# Patient Record
Sex: FEMALE | Race: WHITE | NOT HISPANIC OR LATINO | Employment: OTHER | URBAN - METROPOLITAN AREA
[De-identification: names, ages, dates, MRNs, and addresses within clinical notes are randomized per-mention and may not be internally consistent; named-entity substitution may affect disease eponyms.]

---

## 2017-01-06 DIAGNOSIS — I10 ESSENTIAL HYPERTENSION: ICD-10-CM

## 2017-01-06 DIAGNOSIS — F51.01 PRIMARY INSOMNIA: Primary | ICD-10-CM

## 2017-01-06 RX ORDER — POTASSIUM CITRATE 10 MEQ/1
10 TABLET, EXTENDED RELEASE ORAL
Qty: 270 TABLET | Refills: 1 | Status: SHIPPED | OUTPATIENT
Start: 2017-01-06 | End: 2017-06-07

## 2017-01-06 NOTE — TELEPHONE ENCOUNTER
UROCIT-K      Last Written Prescription Date: 01/08/16  Last Fill Quantity: 270, # refills: 1  Last Office Visit with G, P or [facility] prescribing provider: 05/24/16       POTASSIUM   Date Value Ref Range Status   05/12/2016 3.6 3.4 - 5.3 mmol/L Final     CREATININE   Date Value Ref Range Status   05/12/2016 0.66 0.52 - 1.04 mg/dL Final     BP Readings from Last 3 Encounters:   05/24/16 110/64   02/19/16 122/60   02/16/16 118/64

## 2017-01-06 NOTE — TELEPHONE ENCOUNTER
Prescription approved per [facility] [place].

## 2017-02-27 DIAGNOSIS — F51.01 PRIMARY INSOMNIA: ICD-10-CM

## 2017-02-27 RX ORDER — QUETIAPINE FUMARATE 50 MG/1
50-100 TABLET, FILM COATED ORAL
Qty: 60 TABLET | Refills: 3 | Status: SHIPPED | OUTPATIENT
Start: 2017-02-27 | End: 2017-03-01

## 2017-02-27 NOTE — TELEPHONE ENCOUNTER
QUEtiapine     Last Written Prescription Date: 12/12/16  Last Fill Quantity: 60, # refills: 0  Last Office Visit with G, P or [facility] prescribing provider: 05/24/16       BP Readings from Last 3 Encounters:   05/24/16 110/64   02/19/16 122/60   02/16/16 118/64     Pulse Readings from Last 2 Encounters:   05/24/16 66   02/19/16 70     Lab Results   Component Value Date    GLC 86 05/12/2016     Lab Results   Component Value Date    WBC 7.0 05/10/2016     Lab Results   Component Value Date    RBC 4.29 05/10/2016     Lab Results   Component Value Date    HGB 13.6 05/10/2016     Lab Results   Component Value Date    HCT 41.4 05/10/2016     No components found for: MCT  Lab Results   Component Value Date    MCV 97 05/10/2016     Lab Results   Component Value Date    MCH 31.7 05/10/2016     Lab Results   Component Value Date    MCHC 32.9 05/10/2016     Lab Results   Component Value Date    RDW 12.8 05/10/2016     Lab Results   Component Value Date     05/10/2016     Lab Results   Component Value Date    CHOL 155 05/12/2016     Lab Results   Component Value Date    HDL 76 05/12/2016     Lab Results   Component Value Date    LDL 63 05/12/2016     Lab Results   Component Value Date    TRIG 78 05/12/2016     Lab Results   Component Value Date    CHOLHDLRATIO 2.0 01/19/2015

## 2017-03-01 DIAGNOSIS — F51.01 PRIMARY INSOMNIA: ICD-10-CM

## 2017-03-01 RX ORDER — QUETIAPINE FUMARATE 50 MG/1
50-100 TABLET, FILM COATED ORAL
Qty: 180 TABLET | Refills: 0 | Status: SHIPPED | OUTPATIENT
Start: 2017-03-01 | End: 2017-06-01

## 2017-03-01 NOTE — TELEPHONE ENCOUNTER
QUEtiapine     Last Written Prescription Date: 02/27/17  Last Fill Quantity: 60, # refills: 3  Last Office Visit with G, P or [facility] prescribing provider: 05/19/16       BP Readings from Last 3 Encounters:   05/24/16 110/64   02/19/16 122/60   02/16/16 118/64     Pulse Readings from Last 2 Encounters:   05/24/16 66   02/19/16 70     Lab Results   Component Value Date    GLC 86 05/12/2016     Lab Results   Component Value Date    WBC 7.0 05/10/2016     Lab Results   Component Value Date    RBC 4.29 05/10/2016     Lab Results   Component Value Date    HGB 13.6 05/10/2016     Lab Results   Component Value Date    HCT 41.4 05/10/2016     No components found for: MCT  Lab Results   Component Value Date    MCV 97 05/10/2016     Lab Results   Component Value Date    MCH 31.7 05/10/2016     Lab Results   Component Value Date    MCHC 32.9 05/10/2016     Lab Results   Component Value Date    RDW 12.8 05/10/2016     Lab Results   Component Value Date     05/10/2016     Lab Results   Component Value Date    CHOL 155 05/12/2016     Lab Results   Component Value Date    HDL 76 05/12/2016     Lab Results   Component Value Date    LDL 63 05/12/2016     Lab Results   Component Value Date    TRIG 78 05/12/2016     Lab Results   Component Value Date    CHOLHDLRATIO 2.0 01/19/2015

## 2017-03-10 DIAGNOSIS — E78.2 MIXED HYPERLIPIDEMIA: ICD-10-CM

## 2017-03-10 RX ORDER — ATORVASTATIN CALCIUM 10 MG/1
10 TABLET, FILM COATED ORAL DAILY
Qty: 90 TABLET | Refills: 0 | Status: SHIPPED | OUTPATIENT
Start: 2017-03-10 | End: 2017-06-08

## 2017-03-10 NOTE — TELEPHONE ENCOUNTER
atorvastatin     Last Written Prescription Date: 01/15/16  Last Fill Quantity: 90, # refills: 3  Last Office Visit with G, P or [facility] prescribing provider: 05/24/16       Lab Results   Component Value Date    CHOL 155 05/12/2016     Lab Results   Component Value Date    HDL 76 05/12/2016     Lab Results   Component Value Date    LDL 63 05/12/2016     Lab Results   Component Value Date    TRIG 78 05/12/2016     Lab Results   Component Value Date    CHOLHDLRATIO 2.0 01/19/2015

## 2017-03-16 ENCOUNTER — TELEPHONE (OUTPATIENT)
Dept: INTERNAL MEDICINE | Facility: CLINIC | Age: 77
End: 2017-03-16

## 2017-03-16 DIAGNOSIS — H81.03 MENIERE'S DISEASE, BILATERAL: ICD-10-CM

## 2017-03-16 NOTE — TELEPHONE ENCOUNTER
[facility] and [facility] stated that she didn't receives refills- she has 4 left/ and it takes [facility], she said  [facility] will work     diazepam (VALIUM) 2 MG tablet      Last Written Prescription Date:  12/8/2016  Last Fill Quantity: 90,   # refills: 2  Last Office Visit with [facility], [facility] or [facility] prescribing provider: 5/24/2016  Future Office visit:       Routing refill request to provider for review/approval because:  Drug not on the [facility], [facility] or [facility] refill protocol or controlled substance

## 2017-03-16 NOTE — TELEPHONE ENCOUNTER
Reason for Call:  Medication or medication refill:    Do you use a [facility]?  Name of the pharmacy and phone number for the current request:  [facility] - [place], [place] - [address] [place] [address]    Name of the medication requested: diazepam (VALIUM) 2 MG tablet    Other request: Pt needs a nurse to call her as soon as they open this message to make sure this isnt getting forgotten about. Pt is almost out of the prescription and she needs it for every day.     Can we leave a detailed message on this number? NO    Phone number patient can be reached at: Home number on file [phone] (home)    Best Time: ASAP    Call taken on 3/16/2017 at 9:43 AM by Comfort Hogue

## 2017-03-17 RX ORDER — DIAZEPAM 2 MG
2 TABLET ORAL AT BEDTIME
Qty: 90 TABLET | Refills: 2
Start: 2017-03-17 | End: 2017-05-30

## 2017-04-26 ENCOUNTER — HOSPITAL ENCOUNTER (OUTPATIENT)
Dept: MAMMOGRAPHY | Facility: CLINIC | Age: 77
Discharge: HOME | End: 2017-04-26
Attending: INTERNAL MEDICINE | Admitting: INTERNAL MEDICINE
Payer: MEDICARE

## 2017-04-26 DIAGNOSIS — Z12.31 VISIT FOR SCREENING MAMMOGRAM: ICD-10-CM

## 2017-04-26 PROCEDURE — G0202 SCR MAMMO BI INCL CAD: HCPCS

## 2017-04-26 PROCEDURE — 77063 BREAST TOMOSYNTHESIS BI: CPT

## 2017-04-28 DIAGNOSIS — I10 ESSENTIAL HYPERTENSION: ICD-10-CM

## 2017-04-28 RX ORDER — HYDROCHLOROTHIAZIDE 25 MG/1
TABLET ORAL
Qty: 90 TABLET | Refills: 0 | Status: SHIPPED | OUTPATIENT
Start: 2017-04-28 | End: 2017-07-28

## 2017-04-28 NOTE — TELEPHONE ENCOUNTER
hydrochlorothiazide (HYDRODIURIL) 25 MG tablet      Last Written Prescription Date: 11/1/16  Last Fill Quantity: 90, # refills: 1  Last Office Visit with [facility], [facility] or [facility] prescribing provider: 5/24/16       Potassium   Date Value Ref Range Status   05/12/2016 3.6 3.4 - 5.3 mmol/L Final     Creatinine   Date Value Ref Range Status   05/12/2016 0.66 0.52 - 1.04 mg/dL Final     BP Readings from Last 3 Encounters:   05/24/16 110/64   02/19/16 122/60   02/16/16 118/64

## 2017-05-30 DIAGNOSIS — H81.03 MENIERE'S DISEASE, BILATERAL: ICD-10-CM

## 2017-05-30 NOTE — TELEPHONE ENCOUNTER
Patient calling requesting refill of Diazepam.  States she never picked up prescription at [facility] that was called in 3/17/17 and that [facility].  After talking with Dr. Gregory MD will approve 3 month supply ONLY if patient is going to follow up with her soon.  Patient last saw Dr. Zarate so if she is going to follow up with [facility], we must send refill to [facility] per Dr. Jenkins.  Left message for patient to call back to clarify.  Must schedule appointment with Dr. Jenkins and have lab work done before appointment if she is going to continue to see her in clinic.

## 2017-05-31 NOTE — TELEPHONE ENCOUNTER
Controlled Substance Refill Request for diazepam  Problem List Complete:  Yes    Last Written Prescription Date:  03/17/17  Last Fill Quantity: 90,   # refills: 2    Last Office Visit with [facility] primary care provider: 05/24/16    Clinic visit frequency required: Q 3 months     Future Office visit: 08/08/17  Next 5 appointments (look out 90 days)     Aug 08, 2017  9:30 AM CDT   PHYSICAL with Martinez Jenkins MD   [facility]    [address] [place] [address] [phone]                  Controlled substance agreement on file: No.     Processing:  [facility]   checked in past 6 months?  No, route to RN

## 2017-05-31 NOTE — TELEPHONE ENCOUNTER
Spoke with patient who confirmed that she would like to stay under the care of Dr. Jenkins.  Scheduled appointment for August 8th.  3 month supply called into pharmacy.  Please sign refill and place future lab orders.  Thank you.

## 2017-06-01 DIAGNOSIS — F51.01 PRIMARY INSOMNIA: ICD-10-CM

## 2017-06-01 RX ORDER — QUETIAPINE FUMARATE 50 MG/1
TABLET, FILM COATED ORAL
Qty: 180 TABLET | Refills: 0 | Status: SHIPPED | OUTPATIENT
Start: 2017-06-01 | End: 2017-08-08

## 2017-06-01 NOTE — TELEPHONE ENCOUNTER
Medication is being filled for 1 time refill only due to:  Patient needs to be seen because it has been more than one year since last visit.  Pt does have upcoming appt scheduled

## 2017-06-01 NOTE — TELEPHONE ENCOUNTER
quetiapine     Last Written Prescription Date: 03/01/17  Last Fill Quantity: 180, # refills: 0  Last Office Visit with [facility], [facility] or  [facility] prescribing provider: 05/24/16  Next 5 appointments (look out 90 days)     Aug 08, 2017  9:30 AM CDT   PHYSICAL with Martinez Jenkins MD   [facility] ([facility])    [address] [address] [phone]                   BP Readings from Last 3 Encounters:   05/24/16 110/64   02/19/16 122/60   02/16/16 118/64     Pulse Readings from Last 2 Encounters:   05/24/16 66   02/19/16 70     Lab Results   Component Value Date    GLC 86 05/12/2016     Lab Results   Component Value Date    WBC 7.0 05/10/2016     Lab Results   Component Value Date    RBC 4.29 05/10/2016     Lab Results   Component Value Date    HGB 13.6 05/10/2016     Lab Results   Component Value Date    HCT 41.4 05/10/2016     No components found for: MCT  Lab Results   Component Value Date    MCV 97 05/10/2016     Lab Results   Component Value Date    MCH 31.7 05/10/2016     Lab Results   Component Value Date    MCHC 32.9 05/10/2016     Lab Results   Component Value Date    RDW 12.8 05/10/2016     Lab Results   Component Value Date     05/10/2016     Lab Results   Component Value Date    CHOL 155 05/12/2016     Lab Results   Component Value Date    HDL 76 05/12/2016     Lab Results   Component Value Date    LDL 63 05/12/2016     Lab Results   Component Value Date    TRIG 78 05/12/2016     Lab Results   Component Value Date    CHOLHDLRATIO 2.0 01/19/2015

## 2017-06-02 RX ORDER — DIAZEPAM 2 MG
2 TABLET ORAL AT BEDTIME
Qty: 90 TABLET | Refills: 0 | Status: SHIPPED | OUTPATIENT
Start: 2017-06-02 | End: 2017-08-08

## 2017-06-02 NOTE — TELEPHONE ENCOUNTER
Please let her know that I will  Fill her prescription for 3 months without refills as this is a controlled substance and she cannot have a whole year worth of refills per our protocol. I will address further refills at  Her next office visit.

## 2017-06-07 DIAGNOSIS — I10 ESSENTIAL HYPERTENSION: ICD-10-CM

## 2017-06-07 RX ORDER — POTASSIUM CITRATE 10 MEQ/1
10 TABLET, EXTENDED RELEASE ORAL
Qty: 90 TABLET | Refills: 0 | Status: SHIPPED | OUTPATIENT
Start: 2017-06-07 | End: 2017-06-07

## 2017-06-07 RX ORDER — POTASSIUM CITRATE 10 MEQ/1
10 TABLET, EXTENDED RELEASE ORAL
Qty: 270 TABLET | Refills: 0 | Status: SHIPPED | OUTPATIENT
Start: 2017-06-07 | End: 2017-06-08

## 2017-06-08 ENCOUNTER — TELEPHONE (OUTPATIENT)
Dept: INTERNAL MEDICINE | Facility: CLINIC | Age: 77
End: 2017-06-08

## 2017-06-08 DIAGNOSIS — E78.2 MIXED HYPERLIPIDEMIA: ICD-10-CM

## 2017-06-08 DIAGNOSIS — I10 ESSENTIAL HYPERTENSION: ICD-10-CM

## 2017-06-08 RX ORDER — POTASSIUM CITRATE 10 MEQ/1
10 TABLET, EXTENDED RELEASE ORAL
Qty: 90 TABLET | Refills: 0
Start: 2017-06-08 | End: 2017-08-08

## 2017-06-08 RX ORDER — POTASSIUM CITRATE 10 MEQ/1
10 TABLET, EXTENDED RELEASE ORAL
Qty: 10 TABLET | Refills: 0 | Status: SHIPPED | OUTPATIENT
Start: 2017-06-08 | End: 2017-06-08

## 2017-06-08 RX ORDER — POTASSIUM CITRATE 10 MEQ/1
10 TABLET, EXTENDED RELEASE ORAL
Qty: 270 TABLET | Refills: 0 | Status: SHIPPED | OUTPATIENT
Start: 2017-06-08 | End: 2017-08-08

## 2017-06-08 RX ORDER — POTASSIUM CITRATE 10 MEQ/1
10 TABLET, EXTENDED RELEASE ORAL
Qty: 30 TABLET | Refills: 0 | Status: SHIPPED | OUTPATIENT
Start: 2017-06-08 | End: 2017-06-08

## 2017-06-08 RX ORDER — ATORVASTATIN CALCIUM 10 MG/1
TABLET, FILM COATED ORAL
Qty: 90 TABLET | Refills: 0 | Status: SHIPPED | OUTPATIENT
Start: 2017-06-08 | End: 2017-06-21

## 2017-06-08 NOTE — TELEPHONE ENCOUNTER
Prescription approved per [facility] Refill Protocol.  Pt has upcoming appt scheduled.

## 2017-06-08 NOTE — TELEPHONE ENCOUNTER
Pt needed a 10 days supply till mail order comes . It was $10 for a 10 day or 30 day supply so gave her 30 days .Francesca Nguyễn RN  Also [facility].Francesca Nguyễn RN

## 2017-06-21 DIAGNOSIS — E78.2 MIXED HYPERLIPIDEMIA: ICD-10-CM

## 2017-06-21 RX ORDER — ATORVASTATIN CALCIUM 10 MG/1
10 TABLET, FILM COATED ORAL DAILY
Qty: 90 TABLET | Refills: 0 | Status: CANCELLED | OUTPATIENT
Start: 2017-06-21

## 2017-06-22 RX ORDER — ATORVASTATIN CALCIUM 10 MG/1
10 TABLET, FILM COATED ORAL DAILY
Qty: 90 TABLET | Refills: 0 | Status: SHIPPED | OUTPATIENT
Start: 2017-06-22 | End: 2017-08-08

## 2017-06-24 ENCOUNTER — TELEPHONE (OUTPATIENT)
Dept: INTERNAL MEDICINE | Facility: CLINIC | Age: 77
End: 2017-06-24

## 2017-06-24 DIAGNOSIS — I10 ESSENTIAL HYPERTENSION: ICD-10-CM

## 2017-06-26 NOTE — TELEPHONE ENCOUNTER
Duplicate? Pot chlor requested;  approved pot citrate 06/08/17 for 90

## 2017-06-28 NOTE — TELEPHONE ENCOUNTER
This medication was originally ordered by Dr. BRANDT and has been filled every year by an RN since 2015 as potassium citrate never potassium chloride so this is the correct medication.  See below for original order

## 2017-07-26 ENCOUNTER — TRANSFERRED RECORDS (OUTPATIENT)
Dept: HEALTH INFORMATION MANAGEMENT | Facility: CLINIC | Age: 77
End: 2017-07-26

## 2017-07-28 DIAGNOSIS — I10 ESSENTIAL HYPERTENSION: ICD-10-CM

## 2017-07-28 RX ORDER — HYDROCHLOROTHIAZIDE 25 MG/1
TABLET ORAL
Qty: 30 TABLET | Refills: 0 | Status: SHIPPED | OUTPATIENT
Start: 2017-07-28 | End: 2017-08-08

## 2017-07-28 NOTE — TELEPHONE ENCOUNTER
HCTZ      Last Written Prescription Date: 4/28/17  Last Fill Quantity: 90, # refills: 0  Last Office Visit with G, P or [facility] prescribing provider: 5/24/17  Next 5 appointments (look out 90 days)     Aug 08, 2017  9:30 AM CDT   PHYSICAL with Martinez Jenkins MD   [facility] ([facility])    [address] [address] [phone]                   Potassium   Date Value Ref Range Status   05/12/2016 3.6 3.4 - 5.3 mmol/L Final     Creatinine   Date Value Ref Range Status   05/12/2016 0.66 0.52 - 1.04 mg/dL Final     BP Readings from Last 3 Encounters:   05/24/16 110/64   02/19/16 122/60   02/16/16 118/64

## 2017-07-28 NOTE — TELEPHONE ENCOUNTER
Prescription approved per [facility] Refill Protocol.  Pt has upcoming appointment

## 2017-08-03 ENCOUNTER — DOCUMENTATION ONLY (OUTPATIENT)
Dept: LAB | Facility: CLINIC | Age: 77
End: 2017-08-03

## 2017-08-07 ENCOUNTER — DOCUMENTATION ONLY (OUTPATIENT)
Dept: LAB | Facility: CLINIC | Age: 77
End: 2017-08-07

## 2017-08-07 DIAGNOSIS — E53.8 VITAMIN B12 DEFICIENCY (NON ANEMIC): ICD-10-CM

## 2017-08-07 DIAGNOSIS — I10 ESSENTIAL HYPERTENSION: ICD-10-CM

## 2017-08-07 DIAGNOSIS — E55.9 VITAMIN D DEFICIENCY: ICD-10-CM

## 2017-08-07 DIAGNOSIS — M81.0 OSTEOPOROSIS, UNSPECIFIED OSTEOPOROSIS TYPE, UNSPECIFIED PATHOLOGICAL FRACTURE PRESENCE: ICD-10-CM

## 2017-08-07 DIAGNOSIS — E78.5 HYPERLIPIDEMIA LDL GOAL <130: Primary | ICD-10-CM

## 2017-08-08 ENCOUNTER — OFFICE VISIT (OUTPATIENT)
Dept: INTERNAL MEDICINE | Facility: CLINIC | Age: 77
End: 2017-08-08
Payer: MEDICARE

## 2017-08-08 VITALS
HEIGHT: 62 IN | SYSTOLIC BLOOD PRESSURE: 112 MMHG | HEART RATE: 70 BPM | DIASTOLIC BLOOD PRESSURE: 64 MMHG | TEMPERATURE: 97.8 F | OXYGEN SATURATION: 98 % | WEIGHT: 113 LBS | RESPIRATION RATE: 16 BRPM | BODY MASS INDEX: 20.8 KG/M2

## 2017-08-08 DIAGNOSIS — I10 ESSENTIAL HYPERTENSION: ICD-10-CM

## 2017-08-08 DIAGNOSIS — E78.5 HYPERLIPIDEMIA LDL GOAL <130: ICD-10-CM

## 2017-08-08 DIAGNOSIS — Z00.00 MEDICARE ANNUAL WELLNESS VISIT, SUBSEQUENT: Primary | ICD-10-CM

## 2017-08-08 DIAGNOSIS — E55.9 VITAMIN D DEFICIENCY: ICD-10-CM

## 2017-08-08 DIAGNOSIS — F51.01 PRIMARY INSOMNIA: ICD-10-CM

## 2017-08-08 DIAGNOSIS — E53.8 VITAMIN B12 DEFICIENCY (NON ANEMIC): ICD-10-CM

## 2017-08-08 DIAGNOSIS — R41.3 MEMORY PROBLEM: ICD-10-CM

## 2017-08-08 DIAGNOSIS — Z78.0 ASYMPTOMATIC POSTMENOPAUSAL STATUS: ICD-10-CM

## 2017-08-08 DIAGNOSIS — E78.2 MIXED HYPERLIPIDEMIA: ICD-10-CM

## 2017-08-08 DIAGNOSIS — K59.00 CONSTIPATION, UNSPECIFIED CONSTIPATION TYPE: ICD-10-CM

## 2017-08-08 DIAGNOSIS — H81.03 MENIERE'S DISEASE, BILATERAL: ICD-10-CM

## 2017-08-08 LAB
ALBUMIN SERPL-MCNC: 3.9 G/DL (ref 3.4–5)
ALP SERPL-CCNC: 52 U/L (ref 40–150)
ALT SERPL W P-5'-P-CCNC: 25 U/L (ref 0–50)
ANION GAP SERPL CALCULATED.3IONS-SCNC: 6 MMOL/L (ref 3–14)
AST SERPL W P-5'-P-CCNC: 27 U/L (ref 0–45)
BILIRUB SERPL-MCNC: 0.6 MG/DL (ref 0.2–1.3)
BUN SERPL-MCNC: 17 MG/DL (ref 7–30)
CALCIUM SERPL-MCNC: 9.8 MG/DL (ref 8.5–10.1)
CHLORIDE SERPL-SCNC: 101 MMOL/L (ref 94–109)
CHOLEST SERPL-MCNC: 158 MG/DL
CO2 SERPL-SCNC: 31 MMOL/L (ref 20–32)
CREAT SERPL-MCNC: 0.66 MG/DL (ref 0.52–1.04)
DEPRECATED CALCIDIOL+CALCIFEROL SERPL-MC: 49 UG/L (ref 20–75)
ERYTHROCYTE [DISTWIDTH] IN BLOOD BY AUTOMATED COUNT: 12.6 % (ref 10–15)
GFR SERPL CREATININE-BSD FRML MDRD: 86 ML/MIN/1.7M2
GLUCOSE SERPL-MCNC: 90 MG/DL (ref 70–99)
HCT VFR BLD AUTO: 41.8 % (ref 35–47)
HDLC SERPL-MCNC: 85 MG/DL
HGB BLD-MCNC: 13.7 G/DL (ref 11.7–15.7)
LDLC SERPL CALC-MCNC: 55 MG/DL
MCH RBC QN AUTO: 31.4 PG (ref 26.5–33)
MCHC RBC AUTO-ENTMCNC: 32.8 G/DL (ref 31.5–36.5)
MCV RBC AUTO: 96 FL (ref 78–100)
NONHDLC SERPL-MCNC: 73 MG/DL
PLATELET # BLD AUTO: 193 10E9/L (ref 150–450)
POTASSIUM SERPL-SCNC: 3.4 MMOL/L (ref 3.4–5.3)
PROT SERPL-MCNC: 7.3 G/DL (ref 6.8–8.8)
RBC # BLD AUTO: 4.36 10E12/L (ref 3.8–5.2)
SODIUM SERPL-SCNC: 138 MMOL/L (ref 133–144)
T4 FREE SERPL-MCNC: 0.97 NG/DL (ref 0.76–1.46)
TRIGL SERPL-MCNC: 92 MG/DL
TSH SERPL DL<=0.005 MIU/L-ACNC: 2.33 MU/L (ref 0.4–4)
VIT B12 SERPL-MCNC: 1891 PG/ML (ref 193–986)
WBC # BLD AUTO: 4.2 10E9/L (ref 4–11)

## 2017-08-08 PROCEDURE — 82306 VITAMIN D 25 HYDROXY: CPT | Performed by: INTERNAL MEDICINE

## 2017-08-08 PROCEDURE — 82607 VITAMIN B-12: CPT | Performed by: INTERNAL MEDICINE

## 2017-08-08 PROCEDURE — 80053 COMPREHEN METABOLIC PANEL: CPT | Performed by: INTERNAL MEDICINE

## 2017-08-08 PROCEDURE — 84207 ASSAY OF VITAMIN B-6: CPT | Mod: 90 | Performed by: INTERNAL MEDICINE

## 2017-08-08 PROCEDURE — 84443 ASSAY THYROID STIM HORMONE: CPT | Performed by: INTERNAL MEDICINE

## 2017-08-08 PROCEDURE — 36415 COLL VENOUS BLD VENIPUNCTURE: CPT | Performed by: INTERNAL MEDICINE

## 2017-08-08 PROCEDURE — G0439 PPPS, SUBSEQ VISIT: HCPCS | Performed by: INTERNAL MEDICINE

## 2017-08-08 PROCEDURE — 84439 ASSAY OF FREE THYROXINE: CPT | Performed by: INTERNAL MEDICINE

## 2017-08-08 PROCEDURE — 99000 SPECIMEN HANDLING OFFICE-LAB: CPT | Performed by: INTERNAL MEDICINE

## 2017-08-08 PROCEDURE — 85027 COMPLETE CBC AUTOMATED: CPT | Performed by: INTERNAL MEDICINE

## 2017-08-08 PROCEDURE — 80061 LIPID PANEL: CPT | Performed by: INTERNAL MEDICINE

## 2017-08-08 RX ORDER — POLYETHYLENE GLYCOL 3350 17 G/17G
1 POWDER, FOR SOLUTION ORAL DAILY
Qty: 527 G | Refills: 11 | Status: SHIPPED | OUTPATIENT
Start: 2017-08-08 | End: 2017-10-16

## 2017-08-08 RX ORDER — QUETIAPINE FUMARATE 50 MG/1
TABLET, FILM COATED ORAL
Qty: 180 TABLET | Refills: 1 | Status: ON HOLD | OUTPATIENT
Start: 2017-08-08 | End: 2020-01-05

## 2017-08-08 RX ORDER — ATORVASTATIN CALCIUM 10 MG/1
10 TABLET, FILM COATED ORAL DAILY
Qty: 90 TABLET | Refills: 1 | Status: SHIPPED | OUTPATIENT
Start: 2017-08-08

## 2017-08-08 RX ORDER — DIAZEPAM 2 MG
2 TABLET ORAL AT BEDTIME
Qty: 90 TABLET | Refills: 1 | Status: ON HOLD | OUTPATIENT
Start: 2017-08-08 | End: 2023-04-30

## 2017-08-08 RX ORDER — MULTIVIT WITH MINERALS/LUTEIN
1000 TABLET ORAL 2 TIMES DAILY
Qty: 100 CAPSULE | Status: ON HOLD | OUTPATIENT
Start: 2017-08-08 | End: 2021-06-28

## 2017-08-08 RX ORDER — CYANOCOBALAMIN (VITAMIN B-12) 2500 MCG
2500 TABLET, SUBLINGUAL SUBLINGUAL DAILY
Qty: 250 TABLET | Refills: 3 | Status: SHIPPED | OUTPATIENT
Start: 2017-08-08

## 2017-08-08 RX ORDER — POTASSIUM CITRATE 10 MEQ/1
10 TABLET, EXTENDED RELEASE ORAL
Qty: 270 TABLET | Refills: 1 | Status: SHIPPED | OUTPATIENT
Start: 2017-08-08 | End: 2017-10-16

## 2017-08-08 RX ORDER — HYDROCHLOROTHIAZIDE 25 MG/1
TABLET ORAL
Qty: 90 TABLET | Refills: 1 | Status: ON HOLD | OUTPATIENT
Start: 2017-08-08 | End: 2020-01-05

## 2017-08-08 NOTE — PROGRESS NOTES
SUBJECTIVE:   Carmen Zhang is a 77 year old female who presents for Preventive Visit.      Are you in the first 12 months of your Medicare Part B coverage?  No    Healthy Habits:    Do you get at least three servings of calcium containing foods daily (dairy, green leafy vegetables, etc.)? yes    Amount of exercise or daily activities, outside of work: 6 day(s) per week    Problems taking medications regularly No    Medication side effects: No    Have you had an eye exam in the past two years? yes    Do you see a dentist twice per year? yes    Do you have sleep apnea, excessive snoring or daytime drowsiness?trouble sleeping- melatonin     COGNITIVE SCREEN  1) Repeat 3 items (Banana, Sunrise, Chair)    2) Clock draw: NORMAL  3) 3 item recall: Recalls 3 objects  Results: 3 items recalled: COGNITIVE IMPAIRMENT LESS LIKELY    Mini-CogTM Copyright S Giancarlo. Licensed by the author for use in [facility]; reprinted with permission ([email or web address]). All rights reserved.          Reviewed and updated as needed this visit by clinical staffTobacco  Allergies         Reviewed and updated as needed this visit by Provider        Social History   Substance Use Topics     Smoking status: Former Smoker     Packs/day: 3.00     Years: 13.00     Types: Cigarettes     Quit date: 1/1/1961     Smokeless tobacco: Not on file      Comment: STARTED SMOKING AT AGE 8 - quit in 1961     Alcohol use No       The patient does not drink >3 drinks per day nor >7 drinks per week.    Today's PHQ-2 Score:   PHQ-2 ( 1999 [employer]) 8/8/2017 5/24/2016   Q1: Little interest or pleasure in doing things 0 1   Q2: Feeling down, depressed or hopeless 0 1   PHQ-2 Score 0 2         Do you feel safe in your environment - Yes    Do you have a Health Care Directive?: Yes: Advance Directive has been received and scanned.    Current providers sharing in care for this patient include: Patient Care Team:  Martinez Jenkins MD as PCP -  "General      Hearing impairment: No    Ability to successfully perform activities of daily living: Yes, no assistance needed     Fall risk:  Fallen 2 or more times in the past year?: No  Any fall with injury in the past year?: No    Home safety:  none identified      Hyperlipidemia Follow-Up      Rate your low fat/cholesterol diet?: good    Taking statin?  Yes, no muscle aches from statin    Other lipid medications/supplements?:  none    Hypertension Follow-up      Outpatient blood pressures are not being checked.    Low Salt Diet: low salt        Symptoms of Ménière's disease have been stable. Carmen Davila states that she has been somewhat fatigued lately but on review of her medications and appears that she had inadvertently stopped taking vitamin D.   memory has also improved with reduction of serum Vit D level.      The following health maintenance items are reviewed in Epic and correct as of today:Health Maintenance   Topic Date Due     MEDICARE ANNUAL WELLNESS VISIT  01/16/2015     ADVANCE DIRECTIVE PLANNING Q5 YRS  04/19/2016     BMP Q6 MOS  11/12/2016     FALL RISK ASSESSMENT  02/16/2017     TSH Q1 YEAR  05/10/2017     CMP Q1 YR  05/12/2017     LIPID MONITORING Q1 YEAR  05/12/2017     DEXA Q3 YR  01/14/2019     COLONOSCOPY Q10 YR  03/18/2021     TETANUS IMMUNIZATION (SYSTEM ASSIGNED)  02/16/2026     PNEUMOCOCCAL  Completed     Labs reviewed in [facility] -   She had fasting labs drawn this morning and hemoglobin is within acceptable limits but the rest of the test results are still pending.        ROS:  14 point ROS reviewed in detail and negative     OBJECTIVE:   /64 (BP Location: Right arm, Patient Position: Chair, Cuff Size: Adult Regular)  Pulse 70  Temp 97.8  F (36.6  C) (Oral)  Resp 16  Ht 5' 2\" (1.575 m)  Wt 113 lb (51.3 kg)  SpO2 98%  BMI 20.67 kg/m2 Estimated body mass index is 20.67 kg/(m^2) as calculated from the following:    Height as of this encounter: 5' 2\" (1.575 m).    Weight as of this " encounter: 113 lb (51.3 kg).  EXAM:   GENERAL: healthy, alert and no distress  NECK: no adenopathy, no asymmetry, masses, or scars and thyroid normal to palpation  RESP: lungs clear to auscultation - no rales, rhonchi or wheezes  BREAST: normal without masses, tenderness or nipple discharge and no palpable axillary masses or adenopathy  CV: regular rate and rhythm, normal S1 S2, no S3 or S4, no murmur, click or rub, no peripheral edema and peripheral pulses strong  ABDOMEN: soft, nontender, no hepatosplenomegaly, no masses and bowel sounds normal  MS: no gross musculoskeletal defects noted, no edema  SKIN: no suspicious lesions or rashes  NEURO: Normal strength and tone, mentation intact and speech normal  PSYCH: mentation appears normal, affect normal/bright    ASSESSMENT / PLAN:     PREVENTATIVE:   The importance of routine health maintenance including exercise, healthy eating habits, maintaining good mental, sexual health, SBE and need for regular pap smears as indicated is emphasized.    PROBLEM FOCUSED:    ICD-10-CM    1. Medicare annual wellness visit, subsequent Z00.00    2. Essential hypertension I10 hydrochlorothiazide (HYDRODIURIL) 25 MG tablet     potassium citrate (UROCIT-K) 10 MEQ (1080 MG) CR tablet   3. Mixed hyperlipidemia E78.2 atorvastatin (LIPITOR) 10 MG tablet   4. Meniere's disease, bilateral H81.03 diazepam (VALIUM) 2 MG tablet   5. Primary insomnia F51.01 QUEtiapine (SEROQUEL) 50 MG tablet   6. Constipation, unspecified constipation type K59.00 polyethylene glycol (MIRALAX) powder   7. Asymptomatic postmenopausal status Z78.0 calcium-vitamin D (CALCIUM 600 + D) 600-400 MG-UNIT per tablet   8. Vitamin B12 deficiency (non anemic) E53.8 cyabnocobalamin (VITAMIN B-12) 2500 MCG sublingual tablet   9. Memory problem R41.3 vitamin E (RA VITAMIN E) 1000 UNIT capsule   10. Vitamin D deficiency E55.9 cholecalciferol 2000 UNITS CAPS       SIGNIFICANT ISSUES RE The primary encounter diagnosis was Medicare  annual wellness visit, subsequent. Diagnoses of Essential hypertension, Mixed hyperlipidemia, Meniere's disease, bilateral, Primary insomnia, Constipation, unspecified constipation type, Asymptomatic postmenopausal status, Vitamin B12 deficiency (non anemic), Memory problem, and Vitamin D deficiency were also pertinent to this visit. AS NOTED AND ADDRESSED ABOVE   MEDS AND LABS AS ORDERED TO ADDRESS PREVIOUSLY ABNORMAL INDICES    SEE PATIENT INSTRUCTION SECTION FOR FOLLOW UP PLAN    PT TO CONTINUE OTHER TREATMENT REGIMEN/PLANS EXCEPT AS INDICATED    COMPLIANCE WITH MEDICATIONS DIET AND EXERCISE PLANS ENCOURAGED    DISCONTINUED MEDS:  Medications Discontinued During This Encounter   Medication Reason     potassium citrate (UROCIT-K) 10 MEQ (1080 MG) CR tablet      HYDROcodone-acetaminophen (NORCO) 5-325 MG per tablet      hydrochlorothiazide (HYDRODIURIL) 25 MG tablet Reorder     atorvastatin (LIPITOR) 10 MG tablet Reorder     potassium citrate (UROCIT-K) 10 MEQ (1080 MG) CR tablet Reorder     diazepam (VALIUM) 2 MG tablet Reorder     QUEtiapine (SEROQUEL) 50 MG tablet Reorder     polyethylene glycol (MIRALAX) powder Reorder     calcium-vitamin D (CALCIUM 600 + D) 600-400 MG-UNIT per tablet Reorder     Cyanocobalamin (VITAMIN  B-12) 2500 MCG tablet Reorder     vitamin E (RA VITAMIN E) 1000 UNIT capsule Reorder       CURRENT MED LIST WITH CHANGES AS NOTED BELOW:  Current Outpatient Prescriptions   Medication Sig Dispense Refill     hydrochlorothiazide (HYDRODIURIL) 25 MG tablet TAKE 1 TABLET EVERY MORNING TO LOWER BLOOD PRESSURE 90 tablet 1     atorvastatin (LIPITOR) 10 MG tablet Take 1 tablet (10 mg) by mouth daily Pt is requesting that she only receive [employer]. 90 tablet 1     potassium citrate (UROCIT-K) 10 MEQ (1080 MG) CR tablet Take 1 tablet (10 mEq) by mouth 3 times daily (with meals) INDICATION:POTASSIUM SUPPLEMENTATION 270 tablet 1     diazepam (VALIUM) 2 MG tablet Take 1 tablet (2 mg) by mouth At Bedtime  90 tablet 1     QUEtiapine (SEROQUEL) 50 MG tablet TAKE 1 TO 2 TABLETS ( 50  MG) NIGHTLY AS NEEDED FOR SLEEP  tablet 1     polyethylene glycol (MIRALAX) powder Take 17 g (1 capful) by mouth daily INDICATION: CONSTIPATION, TO ACHIEVE 1-2 SOFT BMs PER  g 11     calcium-vitamin D (CALCIUM 600 + D) 600-400 MG-UNIT per tablet Take 1 tablet by mouth 2 times daily FOR BONE HEALTH AND FOR VITAMIN D DEFICIENCY (LOW VITAMIN D) 100 tablet PRN     cyabnocobalamin (VITAMIN B-12) 2500 MCG sublingual tablet Take 2,500 mcg by mouth daily INDICATION: FOR VITAMIN B12 SUPPLEMENTATION - TO IMPROVE MEMORY, BALANCE, SLEEP AND MOOD, DIRECTIONS: PLEASE PLACE UNDER THE TONGUE TO DISSOLVE 250 tablet 3     vitamin E (RA VITAMIN E) 1000 UNIT capsule Take 1 capsule (1,000 Units) by mouth 2 times daily INDICATION: TO HELP IMPROVE MEMORY 100 capsule PRN     cholecalciferol 2000 UNITS CAPS Take 1 capsule by mouth daily 100 capsule PRN     Melatonin 10 MG TABS Take 10 mg by mouth At Bedtime        Lutein 20 MG TABS Take 1 tablet by mouth 2 times daily        FISH OIL 1000 MG OR CAPS 1 DAILY       COQ10 100 MG OR CAPS 2 qd  0     ASPIRIN 81 MG OR TABS 1 TABLET DAILY       MULTIVITAMIN TABS   OR 1 DAILY       [DISCONTINUED] hydrochlorothiazide (HYDRODIURIL) 25 MG tablet TAKE 1 TABLET EVERY MORNING TO LOWER BLOOD PRESSURE (NEED APPOINTMENT FOR REFILLS) 30 tablet 0     [DISCONTINUED] atorvastatin (LIPITOR) 10 MG tablet Take 1 tablet (10 mg) by mouth daily Pt is requesting that she only receive [employer]. 90 tablet 0     [DISCONTINUED] QUEtiapine (SEROQUEL) 50 MG tablet TAKE 1 TO 2 TABLETS ( 50  MG) NIGHTLY AS NEEDED FOR SLEEP  tablet 0     [DISCONTINUED] traZODone (DESYREL) 100 MG tablet Take 1-2 tablets (100-200 mg) by mouth nightly as needed for sleep 90 tablet 3         End of Life Planning:  Patient currently has an advanced directive: No.  I have verified the patient's ablity to prepare an advanced directive/make  "health care decisions.  Literature was provided to assist patient in preparing an advanced directive.    COUNSELING:  Reviewed preventive health counseling, as reflected in patient instructions       Regular exercise       Healthy diet/nutrition       Dental care        Estimated body mass index is 20.67 kg/(m^2) as calculated from the following:    Height as of this encounter: 5' 2\" (1.575 m).    Weight as of this encounter: 113 lb (51.3 kg).     reports that she quit smoking about 56 years ago. Her smoking use included Cigarettes. She has a 39.00 pack-year smoking history. She does not have any smokeless tobacco history on file.        Appropriate preventive services were discussed with this patient, including applicable screening as appropriate for cardiovascular disease, diabetes, osteopenia/osteoporosis, and glaucoma.  As appropriate for age/gender, discussed screening for colorectal cancer, prostate cancer, breast cancer, and cervical cancer. Checklist reviewing preventive services available has been given to the patient.    Reviewed patients plan of care and provided an AVS. The Basic Care Plan (routine screening as documented in Health Maintenance) for Carmen Davila meets the Care Plan requirement. This Care Plan has been established and reviewed with the Patient.    Counseling Resources:  ATP IV Guidelines  Pooled Cohorts Equation Calculator  Breast Cancer Risk Calculator  FRAX Risk Assessment  ICSI Preventive Guidelines  Dietary Guidelines for Americans, 2010  USDA's MyPlate  ASA Prophylaxis  Lung CA Screening    Martinez Jenkins MD  [facility]  "

## 2017-08-08 NOTE — MR AVS SNAPSHOT
After Visit Summary   8/8/2017    Carmen Zhang    MRN: [id number]           Patient Information     Date Of Birth          1940        Visit Information        Provider Department      8/8/2017 9:30 AM Martinez Jenkins MD [facility]        Today's Diagnoses     Medicare annual wellness visit, subsequent    -  1    Essential hypertension        Mixed hyperlipidemia        Meniere's disease, bilateral        Primary insomnia        Constipation, unspecified constipation type        Asymptomatic postmenopausal status        Vitamin B12 deficiency (non anemic)        Memory problem        Vitamin D deficiency          Care Instructions    ** FOLLOW UP PLAN**:    PLEASE SCHEDULE FASTING LABS WITH OFFICE VISIT 12 MONTHS FROM TODAY TO FOLLOW UP ON  Blood pressure, cholesterol, to review your lab results and for your annual Medicare visit     BE SURE TO CALL TO SCHEDULE YOUR LAB DRAW APPOINTMENT FOR AT LEAST 5 DAYS BEFORE YOUR NEXT VISIT      YOU MAY CONTACT THE CLINIC IF ANY QUESTIONS OR CONCERNS -[phone] OR VIA [employer]           Preventive Health Recommendations    Female Ages 65 +    Yearly exam:     See your health care provider every year in order to  o Review health changes.   o Discuss preventive care.    o Review your medicines if your doctor has prescribed any.      You no longer need a yearly Pap test unless you've had an abnormal Pap test in the past 10 years. If you have vaginal symptoms, such as bleeding or discharge, be sure to talk with your provider about a Pap test.      Every 1 to 2 years, have a mammogram.  If you are over 69, talk with your health care provider about whether or not you want to continue having screening mammograms.      Every 10 years, have a colonoscopy. Or, have a yearly FIT test (stool test). These exams will check for colon cancer.       Have a cholesterol test every 5 years, or more often if your doctor advises it.       Have  a diabetes test (fasting glucose) every three years. If you are at risk for diabetes, you should have this test more often.       At age 65, have a bone density scan (DEXA) to check for osteoporosis (brittle bone disease).    Shots:    Get a flu shot each year.    Get a tetanus shot every 10 years.    Talk to your doctor about your pneumonia vaccines. There are now two you should receive - Pneumovax (PPSV 23) and Prevnar (PCV 13).    Talk to your doctor about the shingles vaccine.    Talk to your doctor about the hepatitis B vaccine.    Nutrition:     Eat at least 5 servings of fruits and vegetables each day.      Eat whole-grain bread, whole-wheat pasta and brown rice instead of white grains and rice.      Talk to your provider about Calcium and Vitamin D.     Lifestyle    Exercise at least 150 minutes a week (30 minutes a day, 5 days a week). This will help you control your weight and prevent disease.      Limit alcohol to one drink per day.      No smoking.       Wear sunscreen to prevent skin cancer.       See your dentist twice a year for an exam and cleaning.      See your eye doctor every 1 to 2 years to screen for conditions such as glaucoma, macular degeneration and cataracts.          Follow-ups after your visit        Who to contact     If you have questions or need follow up information about today's clinic visit or your schedule please contact [facility] directly at [phone].  Normal or non-critical lab and imaging results will be communicated to you by MyChart, letter or phone within 4 business days after the clinic has received the results. If you do not hear from us within 7 days, please contact the clinic through MyChart or phone. If you have a critical or abnormal lab result, we will notify you by phone as soon as possible.  Submit refill requests through [employer] or call your pharmacy and they will forward the refill request to us. Please allow 3 business days for your  "refill to be completed.          Additional Information About Your Visit        Coveo Information     C[employer]o lets you send messages to your doctor, view your test results, renew your prescriptions, schedule appointments and more. To sign up, go to [email or web address] . Click on \"Log in\" on the left side of the screen, which will take you to the Welcome page. Then click on \"Sign up Now\" on the right side of the page.     You will be asked to enter the access code listed below, as well as some personal information. Please follow the directions to create your username and password.     Your access code is: [email or web address]  Expires: 2017 10:28 AM     Your access code will  in 90 days. If you need help or a new code, please call your [facility] clinic or [phone].        Care EveryWhere ID     This is your Care EveryWhere ID. This could be used by other organizations to access your [facility] medical records  [id number]        Your Vitals Were     Pulse Temperature Respirations Height Pulse Oximetry BMI (Body Mass Index)    70 97.8  F (36.6  C) (Oral) 16 5' 2\" (1.575 m) 98% 20.67 kg/m2       Blood Pressure from Last 3 Encounters:   17 112/64   16 110/64   16 122/60    Weight from Last 3 Encounters:   17 113 lb (51.3 kg)   16 116 lb 1.6 oz (52.7 kg)   16 120 lb (54.4 kg)              Today, you had the following     No orders found for display         Today's Medication Changes          These changes are accurate as of: 17 10:28 AM.  If you have any questions, ask your nurse or doctor.               Start taking these medicines.        Dose/Directions    cholecalciferol 2000 UNITS Caps   Used for:  Vitamin D deficiency   Started by:  Martinez Jenkins MD        Dose:  1 capsule   Take 1 capsule by mouth daily   Quantity:  100 capsule   Refills:  PRN         These medicines have changed or have updated prescriptions.        Dose/Directions    hydrochlorothiazide 25 " MG tablet   Commonly known as:  HYDRODIURIL   This may have changed:  See the new instructions.   Used for:  Essential hypertension   Changed by:  Martinez Jenkins MD        TAKE 1 TABLET EVERY MORNING TO LOWER BLOOD PRESSURE   Quantity:  90 tablet   Refills:  1       potassium citrate 10 MEQ (1080 MG) CR tablet   Commonly known as:  UROCIT-K   This may have changed:  Another medication with the same name was removed. Continue taking this medication, and follow the directions you see here.   Used for:  Essential hypertension   Changed by:  Martinez Jenkins MD        Dose:  10 mEq   Take 1 tablet (10 mEq) by mouth 3 times daily (with meals) INDICATION:POTASSIUM SUPPLEMENTATION   Quantity:  270 tablet   Refills:  1       QUEtiapine 50 MG tablet   Commonly known as:  SEROquel   This may have changed:  See the new instructions.   Used for:  Primary insomnia   Changed by:  Martinez Jenkins MD        TAKE 1 TO 2 TABLETS ( 50  MG) NIGHTLY AS NEEDED FOR SLEEP AID   Quantity:  180 tablet   Refills:  1       vitamin E 1000 UNIT capsule   Commonly known as:  RA VITAMIN E   This may have changed:    - how much to take  - when to take this   Used for:  Memory problem   Changed by:  Martinez Jenkins MD        Dose:  1000 Units   Take 1 capsule (1,000 Units) by mouth 2 times daily INDICATION: TO HELP IMPROVE MEMORY   Quantity:  100 capsule   Refills:  PRN         Stop taking these medicines if you haven't already. Please contact your care team if you have questions.     HYDROcodone-acetaminophen 5-325 MG per tablet   Commonly known as:  NORCO   Stopped by:  Martinez Jenkins MD                Where to get your medicines      These medications were sent to [id number] - [place], [place] - [address] [address] [facility]  [address] [address]     Phone:  [phone]     calcium-vitamin D 600-400 MG-UNIT per tablet    cholecalciferol 2000 UNITS Caps    polyethylene glycol powder     vitamin E 1000 UNIT capsule         These medications were sent to [facility] - [place], [place] - [address]  [address] [address]     Phone:  [phone]     atorvastatin 10 MG tablet    hydrochlorothiazide 25 MG tablet    potassium citrate 10 MEQ (1080 MG) CR tablet    QUEtiapine 50 MG tablet         Some of these will need a paper prescription and others can be bought over the counter.  Ask your nurse if you have questions.     Bring a paper prescription for each of these medications     cyabnocobalamin 2500 MCG sublingual tablet    diazepam 2 MG tablet                Primary Care Provider Office Phone # Fax #    Martinez Jenkins [phone]       [facility] [address] [facility] [address]        Equal Access to Services     MATTHEW RICKS : Levi johnston Solinda, waaxda luqadaha, qaybta kaalmada adejenniferyastanton, isela olsen. So [facility] [phone].    ATENCIÓN: Si habla español, tiene a epstein disposición servicios gratuitos de asistencia lingüística. Naa al [phone].    We comply with applicable federal civil rights laws and [place] laws. We do not discriminate on the basis of race, color, national origin, age, disability sex, sexual orientation or gender identity.            Thank you!     Thank you for choosing [facility]  for your care. Our goal is always to provide you with excellent care. Hearing back from our patients is one way we can continue to improve our services. Please take a few minutes to complete the written survey that you may receive in the mail after your visit with us. Thank you!             Your Updated Medication List - Protect others around you: Learn how to safely use, store and throw away your medicines at www.disposemymeds.org.          This list is accurate as of: 8/8/17 10:28 AM.  Always use your most recent med list.                   Brand  Name Dispense Instructions for use Diagnosis    aspirin 81 MG tablet      1 TABLET DAILY        atorvastatin 10 MG tablet    LIPITOR    90 tablet    Take 1 tablet (10 mg) by mouth daily Pt is requesting that she only receive Mylan Brand.    Mixed hyperlipidemia       calcium-vitamin D 600-400 MG-UNIT per tablet    calcium 600 + D    100 tablet    Take 1 tablet by mouth 2 times daily FOR BONE HEALTH AND FOR VITAMIN D DEFICIENCY (LOW VITAMIN D)    Asymptomatic postmenopausal status       cholecalciferol 2000 UNITS Caps     100 capsule    Take 1 capsule by mouth daily    Vitamin D deficiency       CoQ10 100 MG Caps      2 qd        cyabnocobalamin 2500 MCG sublingual tablet    VITAMIN B-12    250 tablet    Take 2,500 mcg by mouth daily INDICATION: FOR VITAMIN B12 SUPPLEMENTATION - TO IMPROVE MEMORY, BALANCE, SLEEP AND MOOD, DIRECTIONS: PLEASE PLACE UNDER THE TONGUE TO DISSOLVE    Vitamin B12 deficiency (non anemic)       diazepam 2 MG tablet    VALIUM    90 tablet    Take 1 tablet (2 mg) by mouth At Bedtime    Meniere's disease, bilateral       fish oil-omega-3 fatty acids 1000 MG capsule      1 DAILY        hydrochlorothiazide 25 MG tablet    HYDRODIURIL    90 tablet    TAKE 1 TABLET EVERY MORNING TO LOWER BLOOD PRESSURE    Essential hypertension       Lutein 20 MG Tabs      Take 1 tablet by mouth 2 times daily        Melatonin 10 MG Tabs tablet      Take 10 mg by mouth At Bedtime        MULTIVITAMIN TABS   OR      1 DAILY        polyethylene glycol powder    MIRALAX    527 g    Take 17 g (1 capful) by mouth daily INDICATION: CONSTIPATION, TO ACHIEVE 1-2 SOFT BMs PER DAY    Constipation, unspecified constipation type       potassium citrate 10 MEQ (1080 MG) CR tablet    UROCIT-K    270 tablet    Take 1 tablet (10 mEq) by mouth 3 times daily (with meals) INDICATION:POTASSIUM SUPPLEMENTATION    Essential hypertension       QUEtiapine 50 MG tablet    SEROquel    180 tablet    TAKE 1 TO 2 TABLETS ( 50  MG) NIGHTLY  AS NEEDED FOR SLEEP AID    Primary insomnia       vitamin E 1000 UNIT capsule    RA VITAMIN E    100 capsule    Take 1 capsule (1,000 Units) by mouth 2 times daily INDICATION: TO HELP IMPROVE MEMORY    Memory problem

## 2017-08-08 NOTE — PATIENT INSTRUCTIONS
** FOLLOW UP PLAN**:    PLEASE SCHEDULE FASTING LABS WITH OFFICE VISIT 12 MONTHS FROM TODAY TO FOLLOW UP ON  Blood pressure, cholesterol, to review your lab results and for your annual Medicare visit     BE SURE TO CALL TO SCHEDULE YOUR LAB DRAW APPOINTMENT FOR AT LEAST 5 DAYS BEFORE YOUR NEXT VISIT      YOU MAY CONTACT THE CLINIC IF ANY QUESTIONS OR CONCERNS -[phone] OR VIA [employer]           Preventive Health Recommendations    Female Ages 65 +    Yearly exam:     See your health care provider every year in order to  o Review health changes.   o Discuss preventive care.    o Review your medicines if your doctor has prescribed any.      You no longer need a yearly Pap test unless you've had an abnormal Pap test in the past 10 years. If you have vaginal symptoms, such as bleeding or discharge, be sure to talk with your provider about a Pap test.      Every 1 to 2 years, have a mammogram.  If you are over 69, talk with your health care provider about whether or not you want to continue having screening mammograms.      Every 10 years, have a colonoscopy. Or, have a yearly FIT test (stool test). These exams will check for colon cancer.       Have a cholesterol test every 5 years, or more often if your doctor advises it.       Have a diabetes test (fasting glucose) every three years. If you are at risk for diabetes, you should have this test more often.       At age 65, have a bone density scan (DEXA) to check for osteoporosis (brittle bone disease).    Shots:    Get a flu shot each year.    Get a tetanus shot every 10 years.    Talk to your doctor about your pneumonia vaccines. There are now two you should receive - Pneumovax (PPSV 23) and Prevnar (PCV 13).    Talk to your doctor about the shingles vaccine.    Talk to your doctor about the hepatitis B vaccine.    Nutrition:     Eat at least 5 servings of fruits and vegetables each day.      Eat whole-grain bread, whole-wheat pasta and brown rice instead of white  grains and rice.      Talk to your provider about Calcium and Vitamin D.     Lifestyle    Exercise at least 150 minutes a week (30 minutes a day, 5 days a week). This will help you control your weight and prevent disease.      Limit alcohol to one drink per day.      No smoking.       Wear sunscreen to prevent skin cancer.       See your dentist twice a year for an exam and cleaning.      See your eye doctor every 1 to 2 years to screen for conditions such as glaucoma, macular degeneration and cataracts.

## 2017-08-10 LAB — VIT B6 SERPL-MCNC: 168.2 NG/ML

## 2017-09-28 ENCOUNTER — TELEPHONE (OUTPATIENT)
Dept: INTERNAL MEDICINE | Facility: CLINIC | Age: 77
End: 2017-09-28

## 2017-10-16 ENCOUNTER — TELEPHONE (OUTPATIENT)
Dept: INTERNAL MEDICINE | Facility: CLINIC | Age: 77
End: 2017-10-16

## 2017-10-16 DIAGNOSIS — K59.00 CONSTIPATION, UNSPECIFIED CONSTIPATION TYPE: ICD-10-CM

## 2017-10-16 DIAGNOSIS — I10 ESSENTIAL HYPERTENSION: ICD-10-CM

## 2017-10-16 NOTE — TELEPHONE ENCOUNTER
Reason for Call:  Same Day Appointment, Requested Provider:  Dr Jenkins    PCP: Martinez Jenkins    Reason for visit: chest congestion--pt declined scheduling with a different provider.    Duration of symptoms: 2-3 weeks    Have you been treated for this in the past?     Additional comments: Pt wants to see Dr Jenkins before she leaves.    Can we leave a detailed message on this number? YES    Phone number patient can be reached at: Home number on file [phone] (home)    Best Time: asap    Call taken on 10/16/2017 at 10:28 AM by VALERIE SHARMA

## 2017-10-16 NOTE — TELEPHONE ENCOUNTER
potassium citrate (UROCIT-K) 10 MEQ (1080 MG) CR tablet      Last Written Prescription Date:  8/8/17  Last Fill Quantity: 270,   # refills: 1  Last Office Visit with [facility], [facility] or [facility] prescribing provider: 8/8/17  Future Office visit:       Routing refill request to provider for review/approval because:  Drug not on the [facility], [facility] or [facility] refill protocol or controlled substance

## 2017-10-16 NOTE — TELEPHONE ENCOUNTER
Patient returned Basia's call. Please call her back at [phone]. Anytime today. Tomorrow call anytime and can lv a detailed message. She is leaving town toward the end of the month and needs this before she leaves. ASAP!

## 2017-10-16 NOTE — TELEPHONE ENCOUNTER
Patient is asking if she could get a 90 day supply with 3 additional refills because she does not like having to call every month to call this rx in. Also per her insurance she could get a 90 day supply through [facility] for free.    polyethylene glycol (MIRALAX) powder      Last Written Prescription Date: 08/08/2017  Last Fill Quantity: 527g,  # refills: 11   Last Office Visit with [facility], [facility] or [facility] prescribing provider: 08/08/2017

## 2017-10-17 RX ORDER — POLYETHYLENE GLYCOL 3350 17 G/17G
1 POWDER, FOR SOLUTION ORAL DAILY
Qty: 1581 G | Refills: 2 | Status: SHIPPED | OUTPATIENT
Start: 2017-10-17

## 2017-10-20 ENCOUNTER — OFFICE VISIT (OUTPATIENT)
Dept: INTERNAL MEDICINE | Facility: CLINIC | Age: 77
End: 2017-10-20
Payer: MEDICARE

## 2017-10-20 VITALS
WEIGHT: 117.1 LBS | BODY MASS INDEX: 21.42 KG/M2 | TEMPERATURE: 98.5 F | OXYGEN SATURATION: 97 % | HEART RATE: 70 BPM | SYSTOLIC BLOOD PRESSURE: 112 MMHG | DIASTOLIC BLOOD PRESSURE: 68 MMHG

## 2017-10-20 DIAGNOSIS — J30.2 SEASONAL ALLERGIC RHINITIS, UNSPECIFIED CHRONICITY, UNSPECIFIED TRIGGER: Primary | ICD-10-CM

## 2017-10-20 PROCEDURE — 99213 OFFICE O/P EST LOW 20 MIN: CPT | Performed by: INTERNAL MEDICINE

## 2017-10-20 RX ORDER — POTASSIUM CITRATE 10 MEQ/1
TABLET, EXTENDED RELEASE ORAL
Qty: 200 TABLET | Refills: 1 | Status: ON HOLD | OUTPATIENT
Start: 2017-10-20 | End: 2023-04-30

## 2017-10-20 NOTE — MR AVS SNAPSHOT
"              After Visit Summary   10/20/2017    Carmen Zhang    MRN: [id number]           Patient Information     Date Of Birth          1940        Visit Information        Provider Department      10/20/2017 2:15 PM Nikolay Hameed MD [facility]        Today's Diagnoses     Seasonal allergic rhinitis, unspecified chronicity, unspecified trigger    -  1      Care Instructions    - Take an over-the-counter antihistamine (Claritin, Zyrtec, Allegra) daily for next several days.    -or-    - Use the Flonase nasal spray nightly, before bed.  (Available over-the-counter)           Follow-ups after your visit        Who to contact     If you have questions or need follow up information about today's clinic visit or your schedule please contact [facility] directly at [phone].  Normal or non-critical lab and imaging results will be communicated to you by MyChart, letter or phone within 4 business days after the clinic has received the results. If you do not hear from us within 7 days, please contact the clinic through [employer] or phone. If you have a critical or abnormal lab result, we will notify you by phone as soon as possible.  Submit refill requests through [employer] or call your pharmacy and they will forward the refill request to us. Please allow 3 business days for your refill to be completed.          Additional Information About Your Visit        MyChart Information     Aquiris lets you send messages to your doctor, view your test results, renew your prescriptions, schedule appointments and more. To sign up, go to [email or web address] . Click on \"Log in\" on the left side of the screen, which will take you to the Welcome page. Then click on \"Sign up Now\" on the right side of the page.     You will be asked to enter the access code listed below, as well as some personal information. Please follow the directions to create your username and " password.     Your access code is: [email or web address]  Expires: 2017 10:28 AM     Your access code will  in 90 days. If you need help or a new code, please call your [facility] or [phone].        Care EveryWhere ID     This is your Care EveryWhere ID. This could be used by other organizations to access your [facility] medical records  [id number]        Your Vitals Were     Pulse Temperature Pulse Oximetry BMI (Body Mass Index)          70 98.5  F (36.9  C) (Oral) 97% 21.42 kg/m2         Blood Pressure from Last 3 Encounters:   10/20/17 112/68   17 112/64   16 110/64    Weight from Last 3 Encounters:   10/20/17 117 lb 1.6 oz (53.1 kg)   17 113 lb (51.3 kg)   16 116 lb 1.6 oz (52.7 kg)              Today, you had the following     No orders found for display       Primary Care Provider Office Phone # Fax #    Martinez Jenkins [phone]       [address] [facility] [address]        Equal Access to Services     HUSSAIN RICKS AH: Hadii mary trejoo Solinda, waaxda luqadaha, qaybta kaalmada adejenniferyada, isela olsen. So [facility] [phone].    ATENCIÓN: Si habla español, tiene a epstein disposición servicios gratuitos de asistencia lingüística. Llame al [phone].    We comply with applicable federal civil rights laws and [place] laws. We do not discriminate on the basis of race, color, national origin, age, disability, sex, sexual orientation, or gender identity.            Thank you!     Thank you for choosing [facility]  for your care. Our goal is always to provide you with excellent care. Hearing back from our patients is one way we can continue to improve our services. Please take a few minutes to complete the written survey that you may receive in the mail after your visit with us. Thank you!             Your Updated Medication List - Protect others around you: Learn how to safely use, store and throw away  your medicines at www.disposemymeds.org.          This list is accurate as of: 10/20/17  2:51 PM.  Always use your most recent med list.                   Brand Name Dispense Instructions for use Diagnosis    aspirin 81 MG tablet      1 TABLET DAILY        atorvastatin 10 MG tablet    LIPITOR    90 tablet    Take 1 tablet (10 mg) by mouth daily Pt is requesting that she only receive Mylan Brand.    Mixed hyperlipidemia       calcium-vitamin D 600-400 MG-UNIT per tablet    calcium 600 + D    100 tablet    Take 1 tablet by mouth 2 times daily FOR BONE HEALTH AND FOR VITAMIN D DEFICIENCY (LOW VITAMIN D)    Asymptomatic postmenopausal status       cholecalciferol 2000 UNITS Caps     100 capsule    Take 1 capsule by mouth daily    Vitamin D deficiency       CoQ10 100 MG Caps      2 qd        cyabnocobalamin 2500 MCG sublingual tablet    VITAMIN B-12    250 tablet    Take 2,500 mcg by mouth daily INDICATION: FOR VITAMIN B12 SUPPLEMENTATION - TO IMPROVE MEMORY, BALANCE, SLEEP AND MOOD, DIRECTIONS: PLEASE PLACE UNDER THE TONGUE TO DISSOLVE    Vitamin B12 deficiency (non anemic)       diazepam 2 MG tablet    VALIUM    90 tablet    Take 1 tablet (2 mg) by mouth At Bedtime    Meniere's disease, bilateral       fish oil-omega-3 fatty acids 1000 MG capsule      1 DAILY        hydrochlorothiazide 25 MG tablet    HYDRODIURIL    90 tablet    TAKE 1 TABLET EVERY MORNING TO LOWER BLOOD PRESSURE    Essential hypertension       Lutein 20 MG Tabs      Take 1 tablet by mouth 2 times daily        Melatonin 10 MG Tabs tablet      Take 10 mg by mouth At Bedtime        MULTIVITAMIN TABS   OR      1 DAILY        polyethylene glycol powder    MIRALAX    1581 g    Take 17 g (1 capful) by mouth daily INDICATION: CONSTIPATION, TO ACHIEVE 1-2 SOFT BMs PER DAY    Constipation, unspecified constipation type       potassium citrate 10 MEQ (1080 MG) CR tablet    UROCIT-K    270 tablet    Take 1 tablet (10 mEq) by mouth 3 times daily (with meals)  INDICATION:POTASSIUM SUPPLEMENTATION    Essential hypertension       QUEtiapine 50 MG tablet    SEROquel    180 tablet    TAKE 1 TO 2 TABLETS ( 50  MG) NIGHTLY AS NEEDED FOR SLEEP AID    Primary insomnia       vitamin E 1000 UNIT capsule    RA VITAMIN E    100 capsule    Take 1 capsule (1,000 Units) by mouth 2 times daily INDICATION: TO HELP IMPROVE MEMORY    Memory problem

## 2017-10-20 NOTE — PROGRESS NOTES
SUBJECTIVE:   Carmen Zhang is a 77 year old female who presents to clinic today for the following health issues:      ALLERGIES      Duration: 2-3 weeks    Description:   Nasal congestion: no but has some chest congestion   Sneezing: no   Red, itchy eyes: no     Accompanying signs and symptoms: clears her throat a lot - has a raspy voice    History (similar episodes/allergy testing): yes    Precipitating or alleviating factors: None    Therapies tried and outcome: None            Problem list and histories reviewed & adjusted, as indicated.  Additional history: as documented        Reviewed and updated as needed this visit by clinical staffTobacco  Allergies  Meds  Soc Hx      Reviewed and updated as needed this visit by Provider         ROS:  Constitutional, HEENT, cardiovascular, pulmonary, gi and gu systems are negative, except as otherwise noted.      OBJECTIVE:   /68  Pulse 70  Temp 98.5  F (36.9  C) (Oral)  Wt 117 lb 1.6 oz (53.1 kg)  SpO2 97%  BMI 21.42 kg/m2  Body mass index is 21.42 kg/(m^2).  GENERAL: healthy, alert and no distress  HENT: Congested nasal mucosa with clear rhinorrhea  NECK: no adenopathy, no asymmetry, masses, or scars and thyroid normal to palpation  RESP: lungs clear to auscultation - no rales, rhonchi or wheezes  CV: regular rate and rhythm, normal S1 S2, no S3 or S4, no murmur, click or rub, no peripheral edema and peripheral pulses strong  ABDOMEN: soft, nontender, no hepatosplenomegaly, no masses and bowel sounds normal  MS: no gross musculoskeletal defects noted, no edema        ASSESSMENT/PLAN:     1. Seasonal allergic rhinitis, unspecified chronicity, unspecified trigger  No evidence of bacterial infection, no need for antibiotic therapy.  Suggested nasal steroid spray, or daily non-sedating antihistamine.          Nikolay Hameed MD  [facility]

## 2017-10-20 NOTE — NURSING NOTE
"Chief Complaint   Patient presents with     Allergies     chest congestion she feels is from allergies        Initial /68  Pulse 70  Temp 98.5  F (36.9  C) (Oral)  Wt 117 lb 1.6 oz (53.1 kg)  SpO2 97%  BMI 21.42 kg/m2 Estimated body mass index is 21.42 kg/(m^2) as calculated from the following:    Height as of 8/8/17: 5' 2\" (1.575 m).    Weight as of this encounter: 117 lb 1.6 oz (53.1 kg).  Medication Reconciliation: complete    "

## 2017-10-20 NOTE — PATIENT INSTRUCTIONS
- Take an over-the-counter antihistamine (Claritin, Zyrtec, Allegra) daily for next several days.    -or-    - Use the Flonase nasal spray nightly, before bed.  (Available over-the-counter)

## 2017-10-20 NOTE — TELEPHONE ENCOUNTER
Called patient back. She said she has been having cough and chest congestion. Not coughing up phlegm. Feels constant need to clear throat. Voice is changing. Symptoms for 2-3 months. Wondering if could be allergies. Eyes get dry. Cough not very frequent. No chest pain. No SOB. Did PCP want to work her in?

## 2018-03-13 DIAGNOSIS — H81.03 MENIERE'S DISEASE, BILATERAL: ICD-10-CM

## 2018-03-13 RX ORDER — DIAZEPAM 2 MG
2 TABLET ORAL AT BEDTIME
Qty: 90 TABLET | Refills: 1 | Status: CANCELLED | OUTPATIENT
Start: 2018-03-13

## 2018-08-22 ENCOUNTER — TELEPHONE (OUTPATIENT)
Dept: CARDIOLOGY | Facility: CLINIC | Age: 78
End: 2018-08-22

## 2018-08-22 NOTE — TELEPHONE ENCOUNTER
VM from patient requesting that her last OV note and testing with Dr. Carson be faxed over to Dr. Jenkins's office. Patient provided fax number of [phone]. Records faxed per patient request.

## 2018-08-23 ENCOUNTER — TELEPHONE (OUTPATIENT)
Dept: CARDIOLOGY | Facility: CLINIC | Age: 78
End: 2018-08-23

## 2018-08-23 NOTE — TELEPHONE ENCOUNTER
"Call from patient requesting that we send her records from Dr. Carson to her PCP, Dr. Jenkins. Reviewed with patient that we had sent the records as requested yesterday and reviewed with patient that all her records are available for her [place] providers.   Patient mentioned that she \"just saw Dr. Carson recently\". Reviewed with patient that her last visit here was in 2014. Patient became very upset stating \"I may be 78 years old but I remember when I've seen a doctor\". Reviewed with patient that our last records are from 2014, patient states this must be a mistake on our part. She states she just wanted to be sure Dr. Jenkins has her most recent records so she won't have to repeat tests unnecessarily.  "

## 2018-09-04 ENCOUNTER — HOSPITAL ENCOUNTER (OUTPATIENT)
Dept: MAMMOGRAPHY | Facility: CLINIC | Age: 78
Discharge: HOME | End: 2018-09-04
Attending: INTERNAL MEDICINE | Admitting: INTERNAL MEDICINE
Payer: MEDICARE

## 2018-09-04 DIAGNOSIS — Z12.31 ENCOUNTER FOR SCREENING MAMMOGRAM FOR BREAST CANCER: ICD-10-CM

## 2018-09-04 DIAGNOSIS — Z12.31 ENCOUNTER FOR SCREENING MAMMOGRAM FOR BREAST CANCER: Primary | ICD-10-CM

## 2018-09-04 PROCEDURE — 77067 SCR MAMMO BI INCL CAD: CPT

## 2018-10-17 ENCOUNTER — NURSE TRIAGE (OUTPATIENT)
Dept: NURSING | Facility: CLINIC | Age: 78
End: 2018-10-17

## 2019-01-17 ENCOUNTER — HOSPITAL ENCOUNTER (OUTPATIENT)
Dept: BONE DENSITY | Facility: CLINIC | Age: 79
Discharge: HOME | End: 2019-01-17
Attending: INTERNAL MEDICINE | Admitting: INTERNAL MEDICINE
Payer: MEDICARE

## 2019-01-17 DIAGNOSIS — Z78.0 ASYMPTOMATIC POSTMENOPAUSAL STATUS: Primary | ICD-10-CM

## 2019-01-17 DIAGNOSIS — Z78.0 ASYMPTOMATIC POSTMENOPAUSAL STATUS: ICD-10-CM

## 2019-01-17 PROCEDURE — 77085 DXA BONE DENSITY AXL VRT FX: CPT

## 2019-04-07 ENCOUNTER — APPOINTMENT (OUTPATIENT)
Dept: CT IMAGING | Facility: CLINIC | Age: 79
End: 2019-04-07
Attending: EMERGENCY MEDICINE
Payer: MEDICARE

## 2019-04-07 ENCOUNTER — APPOINTMENT (OUTPATIENT)
Dept: GENERAL RADIOLOGY | Facility: CLINIC | Age: 79
End: 2019-04-07
Attending: EMERGENCY MEDICINE
Payer: MEDICARE

## 2019-04-07 ENCOUNTER — HOSPITAL ENCOUNTER (EMERGENCY)
Facility: CLINIC | Age: 79
Discharge: HOME | End: 2019-04-07
Attending: EMERGENCY MEDICINE | Admitting: EMERGENCY MEDICINE
Payer: MEDICARE

## 2019-04-07 VITALS
SYSTOLIC BLOOD PRESSURE: 124 MMHG | BODY MASS INDEX: 21.44 KG/M2 | HEIGHT: 62 IN | OXYGEN SATURATION: 96 % | HEART RATE: 60 BPM | WEIGHT: 116.5 LBS | DIASTOLIC BLOOD PRESSURE: 60 MMHG | RESPIRATION RATE: 15 BRPM | TEMPERATURE: 97 F

## 2019-04-07 DIAGNOSIS — S40.011A CONTUSION OF RIGHT SHOULDER, INITIAL ENCOUNTER: ICD-10-CM

## 2019-04-07 DIAGNOSIS — R55 SYNCOPE, UNSPECIFIED SYNCOPE TYPE: ICD-10-CM

## 2019-04-07 DIAGNOSIS — S09.90XA CLOSED HEAD INJURY, INITIAL ENCOUNTER: ICD-10-CM

## 2019-04-07 DIAGNOSIS — S01.81XA FACIAL LACERATION, INITIAL ENCOUNTER: ICD-10-CM

## 2019-04-07 DIAGNOSIS — R91.8 PULMONARY NODULES: ICD-10-CM

## 2019-04-07 LAB
ANION GAP SERPL CALCULATED.3IONS-SCNC: 4 MMOL/L (ref 3–14)
BASOPHILS # BLD AUTO: 0 10E9/L (ref 0–0.2)
BASOPHILS NFR BLD AUTO: 0.5 %
BUN SERPL-MCNC: 24 MG/DL (ref 7–30)
CALCIUM SERPL-MCNC: 9.7 MG/DL (ref 8.5–10.1)
CHLORIDE SERPL-SCNC: 105 MMOL/L (ref 94–109)
CO2 SERPL-SCNC: 31 MMOL/L (ref 20–32)
CREAT SERPL-MCNC: 0.65 MG/DL (ref 0.52–1.04)
DIFFERENTIAL METHOD BLD: ABNORMAL
EOSINOPHIL # BLD AUTO: 0.2 10E9/L (ref 0–0.7)
EOSINOPHIL NFR BLD AUTO: 4.2 %
ERYTHROCYTE [DISTWIDTH] IN BLOOD BY AUTOMATED COUNT: 12.5 % (ref 10–15)
GFR SERPL CREATININE-BSD FRML MDRD: 83 ML/MIN/{1.73_M2}
GLUCOSE SERPL-MCNC: 99 MG/DL (ref 70–99)
HCT VFR BLD AUTO: 38.3 % (ref 35–47)
HGB BLD-MCNC: 13.2 G/DL (ref 11.7–15.7)
IMM GRANULOCYTES # BLD: 0 10E9/L (ref 0–0.4)
IMM GRANULOCYTES NFR BLD: 0.3 %
INTERPRETATION ECG - MUSE: NORMAL
LYMPHOCYTES # BLD AUTO: 1.6 10E9/L (ref 0.8–5.3)
LYMPHOCYTES NFR BLD AUTO: 41.3 %
MCH RBC QN AUTO: 32.3 PG (ref 26.5–33)
MCHC RBC AUTO-ENTMCNC: 34.5 G/DL (ref 31.5–36.5)
MCV RBC AUTO: 94 FL (ref 78–100)
MONOCYTES # BLD AUTO: 0.4 10E9/L (ref 0–1.3)
MONOCYTES NFR BLD AUTO: 10.1 %
NEUTROPHILS # BLD AUTO: 1.7 10E9/L (ref 1.6–8.3)
NEUTROPHILS NFR BLD AUTO: 43.6 %
NRBC # BLD AUTO: 0 10*3/UL
NRBC BLD AUTO-RTO: 0 /100
PLATELET # BLD AUTO: 146 10E9/L (ref 150–450)
POTASSIUM SERPL-SCNC: 3.3 MMOL/L (ref 3.4–5.3)
RBC # BLD AUTO: 4.09 10E12/L (ref 3.8–5.2)
SODIUM SERPL-SCNC: 140 MMOL/L (ref 133–144)
TROPONIN I SERPL-MCNC: <0.015 UG/L (ref 0–0.04)
WBC # BLD AUTO: 3.8 10E9/L (ref 4–11)

## 2019-04-07 PROCEDURE — 71046 X-RAY EXAM CHEST 2 VIEWS: CPT

## 2019-04-07 PROCEDURE — 12013 RPR F/E/E/N/L/M 2.6-5.0 CM: CPT

## 2019-04-07 PROCEDURE — 71250 CT THORAX DX C-: CPT

## 2019-04-07 PROCEDURE — 72125 CT NECK SPINE W/O DYE: CPT

## 2019-04-07 PROCEDURE — 73030 X-RAY EXAM OF SHOULDER: CPT | Mod: RT

## 2019-04-07 PROCEDURE — 25000128 H RX IP 250 OP 636: Performed by: EMERGENCY MEDICINE

## 2019-04-07 PROCEDURE — 99285 EMERGENCY DEPT VISIT HI MDM: CPT | Mod: 25

## 2019-04-07 PROCEDURE — 70450 CT HEAD/BRAIN W/O DYE: CPT

## 2019-04-07 PROCEDURE — 85025 COMPLETE CBC W/AUTO DIFF WBC: CPT | Performed by: EMERGENCY MEDICINE

## 2019-04-07 PROCEDURE — 80048 BASIC METABOLIC PNL TOTAL CA: CPT | Performed by: EMERGENCY MEDICINE

## 2019-04-07 PROCEDURE — 84484 ASSAY OF TROPONIN QUANT: CPT | Performed by: EMERGENCY MEDICINE

## 2019-04-07 PROCEDURE — 93005 ELECTROCARDIOGRAM TRACING: CPT

## 2019-04-07 RX ADMIN — SODIUM CHLORIDE 1000 ML: 9 INJECTION, SOLUTION INTRAVENOUS at 06:36

## 2019-04-07 ASSESSMENT — MIFFLIN-ST. JEOR: SCORE: 951.69

## 2019-04-07 ASSESSMENT — ENCOUNTER SYMPTOMS
WOUND: 1
HEADACHES: 0

## 2019-04-07 NOTE — ED AVS SNAPSHOT
Emergency Department  [address] [facility] [address]  Phone:  [phone]  Fax:  [phone]                                    Carmen Zhang   MRN: [id number]    Department:   Emergency Department   Date of Visit:  4/7/2019           After Visit Summary Signature Page    I have received my discharge instructions, and my questions have been answered. I have discussed any challenges I see with this plan with the nurse or doctor.    ..........................................................................................................................................  Patient/Patient Representative Signature      ..........................................................................................................................................  Patient Representative Print Name and Relationship to Patient    ..................................................               ................................................  Date                                   Time    ..........................................................................................................................................  Reviewed by Signature/Title    ...................................................              ..............................................  Date                                               Time          22EPIC Rev 08/18

## 2019-04-07 NOTE — DISCHARGE INSTRUCTIONS
IMPRESSION:  1. Indeterminate 1.4 cm nodule in the left inferior lingula. 0.5 cm indeterminate nodule right middle lobe.  Recommendations for an incidental lung nodule > 8mm:  Low risk patients: Consider follow-up CT in 3 months, PET/CT, and/or tissue sampling.   High risk patients: Same as for low risk patients.    follow-up CT in 3 months with Primary doctor

## 2019-04-07 NOTE — ED PROVIDER NOTES
"  History     Chief Complaint:  Fall     HPI:   The history is provided by the patient and the spouse.      Carmen Zhang is a 78 year old female with a history of hypertension, hyperlipidemia, and tobacco use who presents to the emergency department with her spouse for evaluation after a fall. The patient reports that she got up this morning to get a drink form the kitchen and after this as she was walking to the bathroom she had a syncopal episode. She had no prodromal symptoms prior to the episode including lightheadedness. This fall occurred onto the floor of her bathroom and resulted in a laceration to the right brow and right shoulder pain. Her spouse heard a loud noise but did not come to see her until she yelled for him. As he was helping her up she suddenly became \"dead weight\", though reports that this was due to weakness and not another syncopal episode. He brought her to bed and eventually came to the emergency department for evaluation. Here, the patient expresses that she remembers the events that occurred this morning and at no point has she felt confused. She has not been incontinence of bowel or bladder and hs not had any chest pain or headaches. She has never had an episode like this in the past and expresses that \"I am extremely healthy\". She denies any recent illness or dehydration.     CARDIAC RISK FACTORS:  Sex:    Female   Tobacco:   Positive (former)  Hypertension:   Positive   Hyperlipidemia:  Positive   Diabetes:   Negative   Family History:  Positive     Allergies:  Contrast dye     Medications:    Aspirin 81 mg  Lipitor 10 mg  Valium   Hydrochlorothiazide 25 mg  Lutein   Melatonin   Seroquel 50 mg  Vitamin D     Past Medical History:    Female stress incontinence   Lumbago  Meniere's disease  Hyperlipidemia   Hypertension  Vitamin D deficiency     Past Surgical History:    Breast biopsy  Appendectomy  Bladder suspension  Vein stripping  Hysterectomy  Removal of growth on " "heart    Family History:    CAD: brother multiple stents in his 50's, mother in her 70's, father MI in his 70's  Esophageal cancer: mother  Prostate cancer: father  Parkinson: Father    Social History:  PCP: Martinez Jenkins   Marital Status:    Smoking status: former smoker (quit 1961)  Alcohol use: No      Review of Systems   Cardiovascular: Negative for chest pain.   Skin: Positive for wound.   Neurological: Positive for syncope. Negative for headaches.   All other systems reviewed and are negative.      Physical Exam     Patient Vitals for the past 24 hrs:   BP Temp Temp src Pulse Heart Rate Resp SpO2 Height Weight   04/07/19 1005 124/60 -- -- -- 67 -- -- -- --   04/07/19 0800 117/57 -- -- -- 65 15 96 % -- --   04/07/19 0607 -- -- -- -- 59 16 97 % -- --   04/07/19 0600 128/64 -- -- 60 60 -- 96 % -- --   04/07/19 0502 112/49 97  F (36.1  C) Temporal -- 60 20 96 % 1.575 m (5' 2\") 52.8 kg (116 lb 8 oz)        Physical Exam  Physical Exam   Nursing note and vitals reviewed.  General: Oriented to person, place, and time. Appears well-developed and well-nourished.   Head: No signs of trauma.   Mouth/Throat: Oropharynx is clear and moist.   Eyes: Conjunctivae are normal. Pupils are equal, round, and reactive to light.   Neck: Normal range of motion. No nuchal rigidity.   Cardiovascular: Normal rate and regular rhythm.    Respiratory: Effort normal and breath sounds normal. No respiratory distress.   Abdominal: Soft. There is no tenderness. There is no guarding.   Musculoskeletal: Normal range of motion. no edema. Tenderness around the right humeral head.   Neurological: The patient is alert and oriented to person, place, and time.  PERRLA, EOMI, visual fields intact, strength in upper/lower extremities normal and symmetrical.   Sensation normal. Speech normal  GCS eye subscore is 4. GCS verbal subscore is 5. GCS motor subscore is 6.   Skin: Skin is warm and dry. No rash noted. Laceration lateral to the right " eye brow.   Psychiatric: normal mood and affect. behavior is normal.      Emergency Department Course   ECG (05:13:42):  Rate 61 bpm. ME interval 166. QRS duration 78. QT/QTc 424/426. P-R-T axes 71 74 73.   Normal sinus rhythm  Normal ECG  Interpreted at 0520 by Trierweiler, Chad A, MD.     Imaging:  Radiographic findings were communicated with the patient and family who voiced understanding of the findings.     XR Chest, 2 views:  IMPRESSION:   1. A few small nodular opacities project over the medial aspects of  bilateral mid and lower lungs. These are equivocal for indeterminate  pulmonary nodules. A CT scan of the lungs could be considered for  further evaluation.  2. No other evidence of active cardiopulmonary disease    Shoulder XR, 3 views, Right:  IMPRESSION: No visualized acute fracture or malalignment of the right  shoulder    CT Cervical spine without contrast:  IMPRESSION: Diffuse degenerative changes of the cervical spine. No  evidence for fracture or traumatic malalignment.    CT Head without contrast:  IMPRESSION: No evidence of acute intracranial trauma    CT Chest WO contrast:  IMPRESSION:  1. Indeterminate 1.4 cm nodule in the left inferior lingula. 0.5 cm  indeterminate nodule right middle lobe.    Imaging independently reviewed and agree with radiologist interpretation.      Laboratory:  CBC: WBC 3.8 (L),  (L), ow WNL (HGB 13.2)    BMP: Potassium 3.3 (L), ow WNL (Creatinine 0.65)   0515: Troponin I: <0.015     Procedures:    Narrative: Procedure: Laceration Repair        LACERATION:  A simple clean 4 cm laceration.      LOCATION:  Lateral to the right eye brow      ANESTHESIA:  Local using Lidocaine 1% with epi  total of 3 mLs      PREPARATION:  Irrigation and Scrubbing with Normal Saline and Shur Clens      DEBRIDEMENT:  no debridement      CLOSURE:  Wound was closed with One Layer.  Skin closed with 7 x 6.0 Nylon using 6 simple interrupted and 1 horizontal mattress sutures.      Interventions:  0636: NS 1L IV Bolus       Emergency Department Course:  Patient was first evaluated by my partner Dr. Trierweiler.     IV inserted and blood drawn. This was sent to the lab for further testing, results above.   Above interventions provided.      The patient was sent for a XR and CT while in the emergency department, findings above.     Past medical records, nursing notes, and vitals reviewed.  0617: I performed an exam of the patient and obtained history, as documented above.     Per nurse, the patient had a transient episode of bradycardia in the upper 40's     The patient was sent for a XR and CT while in the emergency department, findings above.     Above procedures given.      I personally reviewed the laboratory results with the Patient and spouse and answered all related questions prior to dishcarge.       0940: I rechecked the patient. Findings and plan explained to the Patient and spouse. Patient discharged home with instructions regarding supportive care, medications, and reasons to return. The importance of close follow-up was reviewed.      Impression & Plan      Medical Decision Making:  Carmen Zhang is a 78 year old female who presents after a syncopal episode and fall from standing height. This resulted in a laceration just lateral to the right eye brow and right shoulder pain. Laceration was repaired as above. Based on her history and exam an extensive work up was undertaken here in the emergency department. Shoulder XR, Cervical CT, and Head CT were all reassuring and negative for any acute pathology. Chest XR did show pulmonary nodules and through shared decision making a Chest CT was obtained. This was significant indeterminate nodules in the right middle lobe and left inferior lingula, so follow-up CT in 6 months was recommended. EKG and troponin are not concerning for any cardiac arrhythmia or ischemic changes. However, I did  the patient and her spouse that her  syncopal episode could be related to an arrhythmia, which may need a pacemaker in the future. Although, studies today are most consistent with syncope related to dehydration or vasovagal in nature. She felt better with a liter of fluids here in the ED. There are no symptoms of shortness of breath or chest pain concerning for a PE.   The laceration was repaired as above. Risks/signs of infection and scarring have been discussed with the patient and family. Sutures should be removed in 5-7 days to limit the chances of scarring.     After a discussion of the results above I spoke to the patient and her spouse regarding discharge to home versus admission for cardiac monitoring. They declined admission today. Therefore, I recommended follow up with her primary care doctor within the next week and to return to the ER should there be recurrence of the symptoms.    Diagnosis:    ICD-10-CM    1. Syncope, unspecified syncope type R55    2. Closed head injury, initial encounter S09.90XA    3. Facial laceration, initial encounter S01.81XA    4. Pulmonary nodules R91.8    5. Contusion of right shoulder, initial encounter S40.011A        Disposition:  Discharged to home with plan as outlined.     Scribe Disclosure:   Devon ALBRIGHT, am serving as a scribe at 6:17 AM on 4/7/2019 to document services personally performed by Tommie Quintana MD based on my observations and the provider's statements to me.       Tommie Quintana MD  4/7/2019    EMERGENCY DEPARTMENT       Tommie Quintana MD  04/07/19 1482

## 2019-05-02 DIAGNOSIS — Z74.09 IMPAIRED FUNCTIONAL MOBILITY, BALANCE, GAIT, AND ENDURANCE: Primary | ICD-10-CM

## 2019-05-22 ENCOUNTER — HOSPITAL ENCOUNTER (OUTPATIENT)
Dept: PHYSICAL THERAPY | Facility: CLINIC | Age: 79
Setting detail: THERAPIES SERIES
End: 2019-05-22
Attending: INTERNAL MEDICINE
Payer: MEDICARE

## 2019-05-22 DIAGNOSIS — Z74.09 IMPAIRED FUNCTIONAL MOBILITY, BALANCE, GAIT, AND ENDURANCE: ICD-10-CM

## 2019-05-22 PROCEDURE — 97161 PT EVAL LOW COMPLEX 20 MIN: CPT | Mod: GP | Performed by: PHYSICAL THERAPIST

## 2019-05-22 PROCEDURE — 97112 NEUROMUSCULAR REEDUCATION: CPT | Mod: GP | Performed by: PHYSICAL THERAPIST

## 2019-05-22 NOTE — PROGRESS NOTES
[facility]        OUTPATIENT PHYSICAL THERAPY FUNCTIONAL EVALUATION  PLAN OF TREATMENT FOR OUTPATIENT REHABILITATION  (COMPLETE FOR INITIAL CLAIMS ONLY)  Patient's Last Name, First Name, M.I.  YOB: 1940  Carmen Zhang        Provider's Name   [facility]   Medical Record No.  [id number]     Start of Care Date:  05/22/19   Onset Date:  04/07/19(ED visit for syncopal episode and fall)   Type:     _X__PT   ____OT  ____SLP Medical Diagnosis:  Impaired functional mobility, balance, gait, and endurance Z74.09      PT Diagnosis:  Impaired balance and safety awareness Visits from SOC:  1                              __________________________________________________________________________________  Plan of Treatment/Functional Goals:  balance training           GOALS  HEP  Patient will demonstrate understanding and compliance to her HEP for continued wellbeing upon discharge from skilled physical therapy.  05/22/19                                                                                 Therapy Frequency:  1 time/week  - recommended but patient refused PT    Predicted Duration of Therapy Intervention:  4 weeks - recommended but patient refused PT    Felicia Joseph, PT                                    I CERTIFY THE NEED FOR THESE SERVICES FURNISHED UNDER        THIS PLAN OF TREATMENT AND WHILE UNDER MY CARE     (Physician co-signature of this document indicates review and certification of the therapy plan).                Certification Date From:  05/22/19   Certification Date To:  05/22/19    Referring Provider:  Martinez Jenkins MD    Initial Assessment  See Epic Evaluation- Start of Care Date: 05/22/19

## 2019-05-22 NOTE — PROGRESS NOTES
05/22/19 0900   Signing Clinician's Name / Credentials   Signing clinician's name / credentials Felicia Joseph DPT   Functional Gait Assessment (ALDAIR Doll, ELIZABETH Alfaro, et al. (2004))   1. GAIT LEVEL SURFACE 3   2. CHANGE IN GAIT SPEED 3   3. GAIT WITH HORIZONTAL HEAD TURNS 3   4. GAIT WITH VERTICAL HEAD TURNS 3   5. GAIT AND PIVOT TURN 3   6. STEP OVER OBSTACLE 3   7. GAIT WITH NARROW BASE OF SUPPORT 1   8. GAIT WITH EYES CLOSED 3   9. AMBULATING BACKWARDS 3   10. STEPS 3   Total Functional Gait Assessment Score   TOTAL SCORE: (MAXIMUM SCORE 30) 28     Functional Gait Assessment (FGA): The FGA assesses postural stability during various walking tasks.   Gait assistive device used: None     Patient Score: 28/30  Scores of <22/30 have been correlated with predicting falls in community-dwelling older adults according to Lavon & Godwin 2010.   Scores of <18/30 have been correlated with increased risk for falls in patients with Parkinsons Disease according to Tung Estrella, Brenner et al 2014.  Minimal Detectable Change for patients with acute/chronic stroke = 4.2 according to Thikenny & Ritschel 2009  Minimal Detectable Change for patients with vestibular disorder = 8 according to Lavon & Godwin 2010    Assessment (rationale for performing, application to patient s function & care plan): Assess risk for falls  Minutes billed as physical performance test: 0

## 2019-05-29 ENCOUNTER — TELEPHONE (OUTPATIENT)
Dept: PHARMACY | Facility: CLINIC | Age: 79
End: 2019-05-29

## 2019-05-29 NOTE — TELEPHONE ENCOUNTER
MTM referral from: [facility] visit (referral by provider)    MTM referral outreach attempt #2 on May 29, 2019 at 1:27 PM      Outcome: Patient not reachable after several attempts, will route to MTM Pharmacist/Provider as an FYI. Thank you for the referral.    Ella Veronica, MTM coordinator Intern

## 2019-06-04 ENCOUNTER — ALLIED HEALTH/NURSE VISIT (OUTPATIENT)
Dept: PHARMACY | Facility: CLINIC | Age: 79
End: 2019-06-04
Attending: INTERNAL MEDICINE
Payer: OTHER GOVERNMENT

## 2019-06-04 DIAGNOSIS — E03.9 HYPOTHYROIDISM, UNSPECIFIED TYPE: ICD-10-CM

## 2019-06-04 DIAGNOSIS — K59.00 CONSTIPATION, UNSPECIFIED CONSTIPATION TYPE: ICD-10-CM

## 2019-06-04 DIAGNOSIS — E55.9 VITAMIN D DEFICIENCY: ICD-10-CM

## 2019-06-04 DIAGNOSIS — F51.01 PRIMARY INSOMNIA: ICD-10-CM

## 2019-06-04 DIAGNOSIS — Z23 ENCOUNTER FOR IMMUNIZATION: ICD-10-CM

## 2019-06-04 DIAGNOSIS — Z82.49 FAMILY HISTORY OF ASCVD (ARTERIOSCLEROTIC CARDIOVASCULAR DISEASE): Primary | ICD-10-CM

## 2019-06-04 DIAGNOSIS — R41.3 MEMORY PROBLEM: ICD-10-CM

## 2019-06-04 DIAGNOSIS — E78.5 HYPERLIPIDEMIA LDL GOAL <130: ICD-10-CM

## 2019-06-04 DIAGNOSIS — E53.8 VITAMIN B12 DEFICIENCY (NON ANEMIC): ICD-10-CM

## 2019-06-04 DIAGNOSIS — I10 ESSENTIAL HYPERTENSION: ICD-10-CM

## 2019-06-04 PROCEDURE — 99605 MTMS BY PHARM NP 15 MIN: CPT | Performed by: PHARMACIST

## 2019-06-04 PROCEDURE — 99607 MTMS BY PHARM ADDL 15 MIN: CPT | Performed by: PHARMACIST

## 2019-06-04 RX ORDER — LEVOTHYROXINE SODIUM 25 UG/1
25 TABLET ORAL DAILY
Status: ON HOLD | COMMUNITY
End: 2021-06-28

## 2019-06-04 NOTE — Clinical Note
Dr. Jenkins--thank you for the [facility] referral --we enjoyed our phone visit today with cristo marie and feel only 2 things to discuss with her at her next OV.Does she need to still take 81mg ASA --she states its for primary prevention based on the Aspree study in 2018.Also we recommended she have the shingrix vaccine series .Thank you , Marie Davey, [facility].Medication Therapy Management [id number] BjerkePharm-[id number].

## 2019-06-04 NOTE — PATIENT INSTRUCTIONS
Recommendations from today's MTM visit:                                                    MTM (medication therapy management) is a service provided by a clinical pharmacist designed to help you get the most of out of your medicines.   Today we reviewed what your medicines are for, how to know if they are working, that your medicines are safe and how to make your medicine regimen as easy as possible.     1. FYI-- Please read the following study --Aspirin for primary prevention(no previous heart attack or stroke)  post age 70 is no longer recommended . Please research the following aspirin study called the Aspree study , September/October 2018. Https://www.nejm.org/doi/full/10.1056/DSJQwx5151814    Please discuss this with your doctor before stopping the aspirin.     2. Please consider having the newest shingles vaccine called Shingrix --it is a 2 injections series then your done for rest of your life. Ask [employer] how much the vaccine would cost you .        Next MTM visit:  Call clinic to make a f/up appointment in future if needed.     To schedule another MTM appointment, please call the clinic directly or you may call the MTM scheduling line at [phone] or toll-free at [phone].     My Clinical Pharmacist's contact information:                                                      It was a pleasure talking with you today!  Please feel free to contact me with any questions or concerns you have.      Marie Davey [facility].  Medication Therapy Management Provider  [phone]  Jose Lorenzana  Pharm[facility].       You may receive a survey about the MTM services you received by email and/or US Mail.  I would appreciate your feedback to help me serve you better in the future. Your comments will be anonymous.

## 2019-06-04 NOTE — PROGRESS NOTES
SUBJECTIVE/OBJECTIVE:                           Carmen Zhang is a 79 year old female called for an initial visit for Medication Therapy Management.  She was referred to me from Dr. Jenkins.    Chief Complaint:   Med review -general. She feels great - age-79 feels like she is going on [age].       Personal Healthcare Goals: stay healthy     Allergies/ADRs: Reviewed in Epic  Tobacco: History of tobacco dependence - quit age 21.   Alcohol: No--was  to alcoholic .   Caffeine: 2 cups/day of coffee  Activity: nordic track 35 minutes 2 x day .   PMH: Reviewed in Epic. No cardiac issues  But heart hx in her family.     Medication Adherence/Access:  no issues reported        Hypertension: Current medications include : hydrochlorothiazide 25mg qam.  Patient does not self-monitor BP.  Patient reports no current medication side effects.    BP Readings from Last 3 Encounters:   04/07/19 124/60   10/20/17 112/68   08/08/17 112/64           Hyperlipidemia: Current therapy includes atorvastatin 10mg once daily + qunol co-q-10 - liquid -2 tsp.= ? Mgs., Fish oil /evening primrose oil -1 daily .  Pt reports no significant myalgias or other side effects.       Recent Labs   Lab Test 08/08/17  0857 05/12/16  0844  01/19/15  1008 01/16/14  0939   CHOL 158 155   < > 170 177   HDL 85 76   < > 85 81   LDL 55 63   < > 70 76   TRIG 92 78   < > 74 100   CHOLHDLRATIO  --   --   --  2.0 2.2    < > = values in this interval not displayed.           Insomnia: Current medications include: quetiapine 50mg . Tab--1 if things going well at home , 2 x50mg if hubby bugging her   Diazepam 2mg. At bedtime + melatonin 10mg. at bedtime . Pt reports no issues.       prevent mi /stroke : she takes her 81mg asa , for primary prevention . Reference Aspree study asa as we discussed she may no longer need it - she;'d like to read the study first .     Vitamin B12: level was 1891 on 8-8-17. Vitamin B12 helps support memory and lessens fatigue.she likes  taking the 2500mcg pill and is ok with a higher blood level.       Supplements: Currently taking : vitamin -e 1000 units daily, womens 50+ daily mvi, calcium /vit-d 1 tab bid , + vitamin -D 5,000 unit pill every other day, Lutein/zeaxanthin pill daily for eyes.     Patient is not interested in stopping/changing supplements.     Constipation:  She takes 1 tablespoonful daily of miralax powder in glass of water .keeps her very regular.        Hypokalemia;  urocit -k 10meq tid --last K+ lab 3.3 --she is unsure why has all these low potassium issues but its lifelong --this med keeps her stable.      Immunizations:  Has not had the newest shingles vaccine - she has been afraid to get it --was told might be sick x 3 days post shot . She goes to [facility] and could check out price at there pharmacy .      Hypothyroidism: Patient is taking  Synthroid 25 mcg daily-in am 30-60- minutes before food. Patient is having the following symptoms: none.   TSH   Date Value Ref Range Status   08/08/2017 2.33 0.40 - 4.00 mU/L Final         ASSESSMENT:                             Current medications were reviewed today.     Medication Adherence: excellent, no issues identified      Hypertension: Stable. Patient is meeting BP goal of < 140/90mmHg.         Hyperlipidemia: Stable. Pt is on moderate intensity statin which is indicated based on 2013 ACC/AHA guidelines for lipid management.          Insomnia: stable despite 3 BEERS meds for sleep --she knows sleep is paramount to her health and has no intention of stopping any of these meds.      prevent mi /stroke : she takes her 81mg asa , for primary prevention . Reference Aspree study asa as we discussed she may no longer need it - she;'d like to read the study first . See plan for details.     Vitamin B12: is at goal of 600-1000pg/mL. Pt would benefit from vitamin B12 lab recheck in 12-24 months.      Supplements: stable :  Patient is not interested in stopping/changing supplements.      Constipation:  stable      Hypokalemia;  Stable       Immunizations:  Has not had the newest shingles vaccine - she has been afraid to get it --was told might be sick x 3 days post shot . She goes to [facility] and could check out price at there pharmacy . See plan for details.       Hypothyroidism: Patient is taking  Synthroid 25 mcg daily-in am 30-60- minutes before food. Patient is having the following symptoms: none.   TSH   Date Value Ref Range Status   08/08/2017 2.33 0.40 - 4.00 mU/L Final           PLAN:                            1. FYI-- Please read the following study --Aspirin for primary prevention(no previous heart attack or stroke)  post age 70 is no longer recommended . Please research the following aspirin study called the Aspree study , September/October 2018. Https://www.nejm.org/doi/full/10.1056/NLUQbg5268362    Discuss with pcp before stopping drug.     2. Please consider having the newest shingles vaccine called Shingrix --it is a 2 injections series then your done for rest of your life. Ask [facility] how much the vaccine would cost you .        Next MTM visit:  Call clinic to make a f/up appointment in future if needed.     I spent 50 minutes with this patient today. All changes were made via collaborative practice agreement with Martinez Jenkins  . A copy of the visit note was provided to the patient's primary care provider.        The patient was mailed a summary of these recommendations as an after visit summary.     Marie Davey [facility].  Medication Therapy Management Provider  [phone]

## 2019-12-05 ENCOUNTER — HOSPITAL ENCOUNTER (EMERGENCY)
Facility: CLINIC | Age: 79
Discharge: HOME | End: 2019-12-06
Attending: EMERGENCY MEDICINE | Admitting: EMERGENCY MEDICINE
Payer: MEDICARE

## 2019-12-05 DIAGNOSIS — T26.90XA CHEMICAL BURN OF EYE: ICD-10-CM

## 2019-12-05 PROCEDURE — 99283 EMERGENCY DEPT VISIT LOW MDM: CPT

## 2019-12-05 RX ORDER — OFLOXACIN 3 MG/ML
1-2 SOLUTION/ DROPS OPHTHALMIC 4 TIMES DAILY
Qty: 3 ML | Refills: 0 | Status: SHIPPED | OUTPATIENT
Start: 2019-12-05 | End: 2020-01-04

## 2019-12-05 RX ORDER — PROPARACAINE HYDROCHLORIDE 5 MG/ML
1 SOLUTION/ DROPS OPHTHALMIC ONCE
Status: DISCONTINUED | OUTPATIENT
Start: 2019-12-05 | End: 2019-12-06 | Stop reason: HOSPADM

## 2019-12-05 RX ORDER — ERYTHROMYCIN 5 MG/G
0.5 OINTMENT OPHTHALMIC AT BEDTIME
Qty: 1 TUBE | Refills: 0 | Status: SHIPPED | OUTPATIENT
Start: 2019-12-05 | End: 2020-01-04

## 2019-12-05 ASSESSMENT — ENCOUNTER SYMPTOMS
EYE PAIN: 1
PHOTOPHOBIA: 0

## 2019-12-05 NOTE — ED AVS SNAPSHOT
Emergency Department  [address] [facility] [address]  Phone:  [phone]  Fax:  [phone]                                    Carmen Zhang   MRN: [id number]    Department:   Emergency Department   Date of Visit:  12/5/2019           After Visit Summary Signature Page    I have received my discharge instructions, and my questions have been answered. I have discussed any challenges I see with this plan with the nurse or doctor.    ..........................................................................................................................................  Patient/Patient Representative Signature      ..........................................................................................................................................  Patient Representative Print Name and Relationship to Patient    ..................................................               ................................................  Date                                   Time    ..........................................................................................................................................  Reviewed by Signature/Title    ...................................................              ..............................................  Date                                               Time          22EPIC Rev 08/18

## 2019-12-06 ENCOUNTER — OFFICE VISIT (OUTPATIENT)
Dept: OPHTHALMOLOGY | Facility: CLINIC | Age: 79
End: 2019-12-06
Attending: OPTOMETRIST
Payer: MEDICARE

## 2019-12-06 VITALS
SYSTOLIC BLOOD PRESSURE: 147 MMHG | RESPIRATION RATE: 16 BRPM | BODY MASS INDEX: 21.95 KG/M2 | HEART RATE: 71 BPM | OXYGEN SATURATION: 99 % | WEIGHT: 120 LBS | TEMPERATURE: 97.5 F | DIASTOLIC BLOOD PRESSURE: 78 MMHG

## 2019-12-06 DIAGNOSIS — T54.3X1A ALKALINE CHEMICAL BURN OF LEFT CORNEA AND CONJUNCTIVAL SAC, INITIAL ENCOUNTER: ICD-10-CM

## 2019-12-06 DIAGNOSIS — H02.122 MECHANICAL ECTROPION OF LOWER EYELIDS OF BOTH EYES: ICD-10-CM

## 2019-12-06 DIAGNOSIS — H35.89 RETINAL PIGMENT EPITHELIAL MOTTLING OF MACULA: Primary | ICD-10-CM

## 2019-12-06 DIAGNOSIS — H02.125 MECHANICAL ECTROPION OF LOWER EYELIDS OF BOTH EYES: ICD-10-CM

## 2019-12-06 DIAGNOSIS — T26.62XA ALKALINE CHEMICAL BURN OF LEFT CORNEA AND CONJUNCTIVAL SAC, INITIAL ENCOUNTER: ICD-10-CM

## 2019-12-06 DIAGNOSIS — H02.403 PTOSIS OF BOTH EYELIDS: ICD-10-CM

## 2019-12-06 DIAGNOSIS — Z96.1 PSEUDOPHAKIA OF BOTH EYES: ICD-10-CM

## 2019-12-06 PROCEDURE — 92134 CPTRZ OPH DX IMG PST SGM RTA: CPT | Mod: ZF | Performed by: OPTOMETRIST

## 2019-12-06 ASSESSMENT — CONF VISUAL FIELD
METHOD: COUNTING FINGERS
OD_NORMAL: 1
OS_NORMAL: 1

## 2019-12-06 ASSESSMENT — VISUAL ACUITY
METHOD: SNELLEN - LINEAR
OS_SC: 20/50
OS_SC+: +2
OS_PH_SC+: -2
OD_PH_SC: 20/40
OD_SC: 20/80
OD_SC+: -1
OS_PH_SC: 20/30

## 2019-12-06 ASSESSMENT — TONOMETRY
OS_IOP_MMHG: 8
OD_IOP_MMHG: 8
IOP_METHOD: ICARE

## 2019-12-06 ASSESSMENT — EXTERNAL EXAM - LEFT EYE: OS_EXAM: NORMAL

## 2019-12-06 ASSESSMENT — EXTERNAL EXAM - RIGHT EYE: OD_EXAM: NORMAL

## 2019-12-06 NOTE — ED PROVIDER NOTES
History     Chief Complaint:  Eye Pain     HPI  Carmen Zhang is a 79 year old female who presents to the emergency department today with eye pain. The patient states that she put cuticle removal chemical in her left eye 30-45 minutes prior to arrival and had initial stinging. she states issues reading clearly. She states she splashed water to rinse it and put in saline drops. She states it has improved since. She denies light sensitivity or other vision issues.     Allergies:  Contrast dye      Medications:    Aspirin 81 mg  Lipitor 10 mg  Valium   Hydrochlorothiazide 25 mg  Lutein   Melatonin   Seroquel 50 mg  Vitamin D      Past Medical History:    Female stress incontinence   Lumbago  Meniere's disease  Hyperlipidemia   Hypertension  Vitamin D deficiency      Past Surgical History:    Breast biopsy  Appendectomy  Bladder suspension  Vein stripping  Hysterectomy  Removal of growth on heart     Family History:    Brother - CAD, MI  Father - CAD, MI, parkinson's  Brother - obesity  Daughter - arthritis     Social History:  The patient was accompanied to the ED with her .  Smoking Status: Former  Smokeless Tobacco: Never  Alcohol Use: No   Drug Use: No   PCP: Martinez Jenkins   Marital Status:     Review of Systems   Eyes: Positive for pain and visual disturbance. Negative for photophobia.   All other systems reviewed and are negative.     Physical Exam     Patient Vitals for the past 24 hrs:   BP Temp Temp src Pulse Resp SpO2 Weight   12/05/19 2203 -- -- -- -- -- -- 54.4 kg (120 lb)   12/05/19 2201 (!) 160/68 97.5  F (36.4  C) Oral 71 16 98 % --        Physical Exam  SKIN: Warm, dry.  HEMATOLOGIC/IMMUNOLOGIC/LYMPHATIC:  No pallor.  HENT:  No facial trauma.  EYES:  Normal extraocular motion.  Acuity noted.  Pupils equal round and reactive to light.  Left conjunctival injection.  Mucoid matter at the medial aspect of the left eye.  Fluorescein dye uptake noted of the left medial conjunctive a  which extends to the cornea.  This uptake involves approximately 30% of the ocular surface about 15% of the cornea is impacted.  No apparent corneal ulcer.  No enophthalmos.  Normal ocular pH measured before and after irrigation.  CARDIOVASCULAR:  Normal rate and regular rhythm.  RESPIRATORY:  No respiratory distress.  MUSCULOSKELETAL: Normal body habitus.  NEUROLOGIC:  Alert, conversant, GCS 15.  PSYCHIATRIC:  Normal mood.    Emergency Department Course     Interventions:  2207 Alcaine 1 drop left eye  2234 Fluorescein 1 strip  Copious irrigation of left eye with 1 L of lactated Ringers    Emergency Department Course:  Nursing notes and vitals reviewed. (10:02 PM) I performed an exam of the patient as documented above.     Medicine administered as documented above.    2211 I consulted with poison control, regarding the patient's history and presentation here in the emergency department.    2230 I rechecked the patient and discussed the results of their workup thus far.     2252 I consulted with Priya Sheldon M ophthalmology, regarding the patient's history and presentation here in the emergency department.    2317 I consulted with Priya Sheldon M ophthalmology again.    2319 I rechecked the patient.     Findings and plan explained to the Patient. Patient discharged home with instructions regarding supportive care, medications, and reasons to return. The importance of close follow-up was reviewed. The patient was prescribed erythromycin and ofloxacin.     Impression & Plan       Medical Decision Making:  Carmen Zhang is a 79 year old female who presents after a chemical exposure to the left eye. I discussed this exposure with poison control center. That provider recommended irrigation, which were performed shortly after the patient's arrival. She also recommended testing for corneal or conjunctival injury, which I also performed. There was a good amount of the medical bulbar conjunctiva and  approximately 15 percent of the adjacent cornea that revealed fluorescin dye uptake consistent with chemical burn. pH however was normal before and after irrigation. The patient did feel improved after irrigation. No obvious corneal ulceration. The consulted ophthalmologist recommended prophylactic antibiotics, eye drop and ointment, ofloxacin and erythromycin respectfully, in addition to artifical tears for hydration. The patient will be evaluated tomorrow in the ophthalmology clinic for a formal examination.     Diagnosis:    ICD-10-CM    1. Chemical burn of eye T26.90XA       Disposition:  Discharged to home.    Discharge Medications:  New Prescriptions    ERYTHROMYCIN (ROMYCIN) 5 MG/GM OPHTHALMIC OINTMENT    Place 0.5 inches Into the left eye At Bedtime for 7 days    OFLOXACIN (OCUFLOX) 0.3 % OPHTHALMIC SOLUTION    Place 1-2 drops Into the left eye 4 times daily for 7 days      Scribe Disclosure:  Vinny ALBRIGHT, am serving as a scribe at 10:02 PM on 12/5/2019 to document services personally performed by Ralf Lovett MD based on my observations and the provider's statements to me.      12/5/2019    EMERGENCY DEPARTMENT       Ralf Lovett MD  12/06/19 7661

## 2019-12-06 NOTE — PROGRESS NOTES
"HPI:  This is an ED follow up from 12/05/19 for getting \"cuticle elluminator\" in the eye - it is potassium hydroxide. The eye was irrigated and PH was normal before and after irrigation. Patient is using erythromycin at bedtime left eye and ocuflox QID left eye.     Today:  PH is 8 prior to irrigation. PH 7 after irrigation    Pertinent Medical History:    Meniere's Disease    Vitamin B12 deficiency    Mixed hyperlipidemia    Hypertension    Memory    Ocular History:    Cataract surgery both eyes 2014. Monovision right eye near and left eye distance.     Family history of macular degeneration - mother.     Eye Medications:    Erythromycin at bedtime left eye     Ocuflox QID left eye     Assessment and Plan:  1.   Chemical Burn, left eye.     Due to \"cuticle elluminator\" which contains potassum hydroxide.     PH 8 prior to irrigation and PH7 after irrigation in-office.    Epithelial defect 8:00-12:00 on the limbus. Collin negative.     Continue erythromycin bernabe at bedtime left eye for 10 days.     Continue ocuflox QID left eye for 10 days.     Monitor in 7-10 days.     2.   Ptosis both upper lids. Longstanding.     Monitor.     3.   Ectropion, both lower lids.     Monitor.     4.   Pseudophakia, both eyes.     Monovision.    right eye near    left eye distance    5.   RPE mottling, right eye.     Macular OCT done today.    Return immediately if there any distortions in vision. Monitor with trice.       Medical History:  Past Medical History:   Diagnosis Date     Female stress incontinence      Herpes simplex without mention of complication      Lumbago 8/17/2006     Meniere's disease      Other ovarian failure      Personal history of other disorders of nervous system and sense organs        Medications:  Current Outpatient Medications   Medication Sig Dispense Refill     ASPIRIN 81 MG OR TABS 1 TABLET DAILY       atorvastatin (LIPITOR) 10 MG tablet Take 1 tablet (10 mg) by mouth daily Pt is requesting that she only " receive Mylan Brand. 90 tablet 1     calcium-vitamin D (CALCIUM 600 + D) 600-400 MG-UNIT per tablet Take 1 tablet by mouth 2 times daily FOR BONE HEALTH AND FOR VITAMIN D DEFICIENCY (LOW VITAMIN D) 100 tablet PRN     cholecalciferol 2000 UNITS CAPS Take 1 capsule by mouth daily 100 capsule PRN     COQ10 100 MG OR CAPS 2 teaspoonsful of Qunol-liquid daily.  0     cyabnocobalamin (VITAMIN B-12) 2500 MCG sublingual tablet Take 2,500 mcg by mouth daily INDICATION: FOR VITAMIN B12 SUPPLEMENTATION - TO IMPROVE MEMORY, BALANCE, SLEEP AND MOOD, DIRECTIONS: PLEASE PLACE UNDER THE TONGUE TO DISSOLVE 250 tablet 3     diazepam (VALIUM) 2 MG tablet Take 1 tablet (2 mg) by mouth At Bedtime 90 tablet 1     erythromycin (ROMYCIN) 5 MG/GM ophthalmic ointment Place 0.5 inches Into the left eye At Bedtime for 7 days 1 Tube 0     FISH OIL 1000 MG OR CAPS 1 DAILY       hydrochlorothiazide (HYDRODIURIL) 25 MG tablet TAKE 1 TABLET EVERY MORNING TO LOWER BLOOD PRESSURE 90 tablet 1     levothyroxine (SYNTHROID/LEVOTHROID) 25 MCG tablet Take 25 mcg by mouth daily       Lutein 20 MG TABS Take 1 tablet by mouth daily (with breakfast) zeaxanthine 5mg. As well.       Melatonin 10 MG TABS Take 10 mg by mouth At Bedtime        MULTIVITAMIN TABS   OR 1 DAILY       ofloxacin (OCUFLOX) 0.3 % ophthalmic solution Place 1-2 drops Into the left eye 4 times daily for 7 days 3 mL 0     polyethylene glycol (MIRALAX) powder Take 17 g (1 capful) by mouth daily INDICATION: CONSTIPATION, TO ACHIEVE 1-2 SOFT BMs PER DAY 1581 g 2     QUEtiapine (SEROQUEL) 50 MG tablet TAKE 1 TO 2 TABLETS ( 50  MG) NIGHTLY AS NEEDED FOR SLEEP  tablet 1     UROCIT-K 10 MEQ (1080 MG) CR tablet TAKE 1 TABLET THREE TIMES A DAY WITH MEALS FOR POTASSIUM SUPPLEMENTATION 200 tablet 1     vitamin E (RA VITAMIN E) 1000 UNIT capsule Take 1 capsule (1,000 Units) by mouth 2 times daily INDICATION: TO HELP IMPROVE MEMORY (Patient taking differently: Take 1,000 Units by mouth daily  INDICATION: TO HELP IMPROVE MEMORY) 100 capsule PRN   Complete documentation of historical and exam elements from today's encounter can be found in the full encounter summary report (not reduplicated in this progress note). I personally obtained the chief complaint(s) and history of present illness.  I confirmed and edited as necessary the review of systems, past medical/surgical history, family history, social history, and examination findings as documented by others; and I examined the patient myself. I personally reviewed the relevant tests, images, and reports as documented above. I formulated and edited as necessary the assessment and plan and discussed the findings and management plan with the patient and family. - Luis Fernando Murdock, LEVI

## 2019-12-12 NOTE — PROGRESS NOTES
"HPI:  This is an ED follow up from 12/05/19 for getting \"cuticle elluminator\" in the eye - it is potassium hydroxide. The eye was irrigated and PH was normal before and after irrigation. Patient is using erythromycin at bedtime left eye and ocuflox QID left eye.     Interval update:     Pertinent Medical History:    Meniere's Disease    Vitamin B12 deficiency    Mixed hyperlipidemia    Hypertension    Memory    Ocular History:    Cataract surgery both eyes 2014. Monovision right eye near and left eye distance.     Family history of macular degeneration - mother.     Chemical Burn, left eye     Eye Medications:    Erythromycin at bedtime left eye     Ocuflox QID left eye     Assessment and Plan:  1.   Chemical Burn, left eye.     Due to \"cuticle elluminator\" which contains potassum hydroxide.     Epithelial defect left eye - now resolved.    Discontinue erythromycin and ocuflox.     2.   Ptosis both upper lids. Longstanding.     Monitor.     3.   Ectropion, both lower lids.     Monitor.     4.   Pseudophakia, both eyes.     Monovision.    right eye near    left eye distance    5.   RPE mottling, right eye.     Macular OCT done today.    Return immediately if there any distortions in vision. Monitor with amsler.     6.   Dry Eye, Syndrome, both eyes.     Artificial tears QID both eyes.       Medical History:  Past Medical History:   Diagnosis Date     Female stress incontinence      Herpes simplex without mention of complication      Lumbago 8/17/2006     Meniere's disease      Other ovarian failure      Personal history of other disorders of nervous system and sense organs        Medications:  Current Outpatient Medications   Medication Sig Dispense Refill     ASPIRIN 81 MG OR TABS 1 TABLET DAILY       atorvastatin (LIPITOR) 10 MG tablet Take 1 tablet (10 mg) by mouth daily Pt is requesting that she only receive Mylan Brand. 90 tablet 1     calcium-vitamin D (CALCIUM 600 + D) 600-400 MG-UNIT per tablet Take 1 tablet " by mouth 2 times daily FOR BONE HEALTH AND FOR VITAMIN D DEFICIENCY (LOW VITAMIN D) 100 tablet PRN     cholecalciferol 2000 UNITS CAPS Take 1 capsule by mouth daily 100 capsule PRN     COQ10 100 MG OR CAPS 2 teaspoonsful of Qunol-liquid daily.  0     cyabnocobalamin (VITAMIN B-12) 2500 MCG sublingual tablet Take 2,500 mcg by mouth daily INDICATION: FOR VITAMIN B12 SUPPLEMENTATION - TO IMPROVE MEMORY, BALANCE, SLEEP AND MOOD, DIRECTIONS: PLEASE PLACE UNDER THE TONGUE TO DISSOLVE 250 tablet 3     diazepam (VALIUM) 2 MG tablet Take 1 tablet (2 mg) by mouth At Bedtime 90 tablet 1     erythromycin (ROMYCIN) 5 MG/GM ophthalmic ointment Place 0.5 inches Into the left eye At Bedtime for 7 days 1 Tube 0     FISH OIL 1000 MG OR CAPS 1 DAILY       hydrochlorothiazide (HYDRODIURIL) 25 MG tablet TAKE 1 TABLET EVERY MORNING TO LOWER BLOOD PRESSURE 90 tablet 1     levothyroxine (SYNTHROID/LEVOTHROID) 25 MCG tablet Take 25 mcg by mouth daily       Lutein 20 MG TABS Take 1 tablet by mouth daily (with breakfast) zeaxanthine 5mg. As well.       Melatonin 10 MG TABS Take 10 mg by mouth At Bedtime        MULTIVITAMIN TABS   OR 1 DAILY       ofloxacin (OCUFLOX) 0.3 % ophthalmic solution Place 1-2 drops Into the left eye 4 times daily for 7 days 3 mL 0     polyethylene glycol (MIRALAX) powder Take 17 g (1 capful) by mouth daily INDICATION: CONSTIPATION, TO ACHIEVE 1-2 SOFT BMs PER DAY 1581 g 2     QUEtiapine (SEROQUEL) 50 MG tablet TAKE 1 TO 2 TABLETS ( 50  MG) NIGHTLY AS NEEDED FOR SLEEP  tablet 1     UROCIT-K 10 MEQ (1080 MG) CR tablet TAKE 1 TABLET THREE TIMES A DAY WITH MEALS FOR POTASSIUM SUPPLEMENTATION 200 tablet 1     vitamin E (RA VITAMIN E) 1000 UNIT capsule Take 1 capsule (1,000 Units) by mouth 2 times daily INDICATION: TO HELP IMPROVE MEMORY (Patient taking differently: Take 1,000 Units by mouth daily INDICATION: TO HELP IMPROVE MEMORY) 100 capsule PRN   Complete documentation of historical and exam elements from  today's encounter can be found in the full encounter summary report (not reduplicated in this progress note). I personally obtained the chief complaint(s) and history of present illness.  I confirmed and edited as necessary the review of systems, past medical/surgical history, family history, social history, and examination findings as documented by others; and I examined the patient myself. I personally reviewed the relevant tests, images, and reports as documented above. I formulated and edited as necessary the assessment and plan and discussed the findings and management plan with the patient and family. - Luis Fernando Murdock, LEVI

## 2019-12-16 ENCOUNTER — OFFICE VISIT (OUTPATIENT)
Dept: OPHTHALMOLOGY | Facility: CLINIC | Age: 79
End: 2019-12-16
Attending: OPTOMETRIST
Payer: MEDICARE

## 2019-12-16 DIAGNOSIS — H02.125 MECHANICAL ECTROPION OF LOWER EYELIDS OF BOTH EYES: ICD-10-CM

## 2019-12-16 DIAGNOSIS — T26.62XD ALKALINE CHEMICAL BURN OF LEFT CORNEA AND CONJUNCTIVAL SAC, SUBSEQUENT ENCOUNTER: ICD-10-CM

## 2019-12-16 DIAGNOSIS — T54.3X1D ALKALINE CHEMICAL BURN OF LEFT CORNEA AND CONJUNCTIVAL SAC, SUBSEQUENT ENCOUNTER: ICD-10-CM

## 2019-12-16 DIAGNOSIS — H04.123 DRY EYE SYNDROME OF BOTH EYES: Primary | ICD-10-CM

## 2019-12-16 DIAGNOSIS — H02.403 PTOSIS OF BOTH EYELIDS: ICD-10-CM

## 2019-12-16 DIAGNOSIS — H02.122 MECHANICAL ECTROPION OF LOWER EYELIDS OF BOTH EYES: ICD-10-CM

## 2019-12-16 PROCEDURE — G0463 HOSPITAL OUTPT CLINIC VISIT: HCPCS | Mod: ZF

## 2019-12-16 RX ORDER — CARBOXYMETHYLCELLULOSE SODIUM 5 MG/ML
1 SOLUTION/ DROPS OPHTHALMIC 4 TIMES DAILY
Qty: 1 BOTTLE | Refills: 11 | Status: ON HOLD | OUTPATIENT
Start: 2019-12-16 | End: 2021-06-28

## 2019-12-16 ASSESSMENT — TONOMETRY
IOP_METHOD: ICARE
OD_IOP_MMHG: 11
OS_IOP_MMHG: 09

## 2019-12-16 ASSESSMENT — VISUAL ACUITY
OS_PH_SC: 20/25
OS_SC: 20/40
OS_PH_SC+: +3
OD_PH_SC: 20/40
OD_SC: 20/200
METHOD: SNELLEN - LINEAR

## 2019-12-16 ASSESSMENT — EXTERNAL EXAM - RIGHT EYE: OD_EXAM: NORMAL

## 2019-12-16 ASSESSMENT — EXTERNAL EXAM - LEFT EYE: OS_EXAM: NORMAL

## 2019-12-16 ASSESSMENT — CONF VISUAL FIELD
OD_NORMAL: 1
OS_NORMAL: 1

## 2019-12-16 NOTE — NURSING NOTE
.  Chief Complaints and History of Present Illnesses   Patient presents with     Follow Up     Chief Complaint(s) and History of Present Illness(es)     Follow Up     Laterality: left eye    Associated symptoms: Negative for eye pain, floaters, flashes and discharge    Treatments tried: eye drops    Response to treatment: significant improvement    Pain scale: 0/10              Comments     10 day follow up chemical burn LE  Doing much better  Ofloxacin qid LE  neno Wagner COA 10:39 AM December 16, 2019

## 2020-01-04 ENCOUNTER — HOSPITAL ENCOUNTER (OUTPATIENT)
Facility: CLINIC | Age: 80
Setting detail: OBSERVATION
Discharge: HOME | End: 2020-01-05
Attending: EMERGENCY MEDICINE | Admitting: INTERNAL MEDICINE
Payer: MEDICARE

## 2020-01-04 ENCOUNTER — APPOINTMENT (OUTPATIENT)
Dept: CARDIOLOGY | Facility: CLINIC | Age: 80
End: 2020-01-04
Attending: INTERNAL MEDICINE
Payer: MEDICARE

## 2020-01-04 DIAGNOSIS — I95.1 ORTHOSTATIC HYPOTENSION: ICD-10-CM

## 2020-01-04 DIAGNOSIS — F51.01 PRIMARY INSOMNIA: ICD-10-CM

## 2020-01-04 DIAGNOSIS — R55 SYNCOPE, UNSPECIFIED SYNCOPE TYPE: ICD-10-CM

## 2020-01-04 LAB
ALBUMIN UR-MCNC: 10 MG/DL
ANION GAP SERPL CALCULATED.3IONS-SCNC: 7 MMOL/L (ref 3–14)
APPEARANCE UR: CLEAR
BASOPHILS # BLD AUTO: 0 10E9/L (ref 0–0.2)
BASOPHILS NFR BLD AUTO: 0.1 %
BILIRUB UR QL STRIP: NEGATIVE
BUN SERPL-MCNC: 35 MG/DL (ref 7–30)
CALCIUM SERPL-MCNC: 8.8 MG/DL (ref 8.5–10.1)
CHLORIDE SERPL-SCNC: 104 MMOL/L (ref 94–109)
CO2 SERPL-SCNC: 28 MMOL/L (ref 20–32)
COLOR UR AUTO: YELLOW
CREAT SERPL-MCNC: 0.61 MG/DL (ref 0.52–1.04)
DIFFERENTIAL METHOD BLD: ABNORMAL
EOSINOPHIL # BLD AUTO: 0.1 10E9/L (ref 0–0.7)
EOSINOPHIL NFR BLD AUTO: 1.7 %
ERYTHROCYTE [DISTWIDTH] IN BLOOD BY AUTOMATED COUNT: 13 % (ref 10–15)
GFR SERPL CREATININE-BSD FRML MDRD: 85 ML/MIN/{1.73_M2}
GLUCOSE SERPL-MCNC: 128 MG/DL (ref 70–99)
GLUCOSE UR STRIP-MCNC: NEGATIVE MG/DL
HCT VFR BLD AUTO: 39.7 % (ref 35–47)
HGB BLD-MCNC: 13.5 G/DL (ref 11.7–15.7)
HGB UR QL STRIP: NEGATIVE
IMM GRANULOCYTES # BLD: 0 10E9/L (ref 0–0.4)
IMM GRANULOCYTES NFR BLD: 0.3 %
INTERPRETATION ECG - MUSE: NORMAL
KETONES UR STRIP-MCNC: 5 MG/DL
LEUKOCYTE ESTERASE UR QL STRIP: NEGATIVE
LYMPHOCYTES # BLD AUTO: 0.4 10E9/L (ref 0.8–5.3)
LYMPHOCYTES NFR BLD AUTO: 4.5 %
MCH RBC QN AUTO: 31.8 PG (ref 26.5–33)
MCHC RBC AUTO-ENTMCNC: 34 G/DL (ref 31.5–36.5)
MCV RBC AUTO: 94 FL (ref 78–100)
MONOCYTES # BLD AUTO: 0.3 10E9/L (ref 0–1.3)
MONOCYTES NFR BLD AUTO: 4.1 %
MUCOUS THREADS #/AREA URNS LPF: PRESENT /LPF
NEUTROPHILS # BLD AUTO: 7 10E9/L (ref 1.6–8.3)
NEUTROPHILS NFR BLD AUTO: 89.3 %
NITRATE UR QL: NEGATIVE
NRBC # BLD AUTO: 0 10*3/UL
NRBC BLD AUTO-RTO: 0 /100
PH UR STRIP: 7 PH (ref 5–7)
PLATELET # BLD AUTO: 180 10E9/L (ref 150–450)
POTASSIUM SERPL-SCNC: 3.4 MMOL/L (ref 3.4–5.3)
RBC # BLD AUTO: 4.24 10E12/L (ref 3.8–5.2)
RBC #/AREA URNS AUTO: 6 /HPF (ref 0–2)
SODIUM SERPL-SCNC: 139 MMOL/L (ref 133–144)
SOURCE: ABNORMAL
SP GR UR STRIP: 1.03 (ref 1–1.03)
TROPONIN I SERPL-MCNC: <0.015 UG/L (ref 0–0.04)
TROPONIN I SERPL-MCNC: <0.015 UG/L (ref 0–0.04)
TSH SERPL DL<=0.005 MIU/L-ACNC: 0.88 MU/L (ref 0.4–4)
UROBILINOGEN UR STRIP-MCNC: NORMAL MG/DL (ref 0–2)
WBC # BLD AUTO: 7.8 10E9/L (ref 4–11)
WBC #/AREA URNS AUTO: <1 /HPF (ref 0–5)

## 2020-01-04 PROCEDURE — 25800030 ZZH RX IP 258 OP 636: Performed by: INTERNAL MEDICINE

## 2020-01-04 PROCEDURE — 96361 HYDRATE IV INFUSION ADD-ON: CPT

## 2020-01-04 PROCEDURE — 36415 COLL VENOUS BLD VENIPUNCTURE: CPT | Performed by: INTERNAL MEDICINE

## 2020-01-04 PROCEDURE — 96360 HYDRATION IV INFUSION INIT: CPT | Mod: 59

## 2020-01-04 PROCEDURE — 25800030 ZZH RX IP 258 OP 636: Performed by: EMERGENCY MEDICINE

## 2020-01-04 PROCEDURE — 80048 BASIC METABOLIC PNL TOTAL CA: CPT | Performed by: EMERGENCY MEDICINE

## 2020-01-04 PROCEDURE — 84443 ASSAY THYROID STIM HORMONE: CPT | Performed by: INTERNAL MEDICINE

## 2020-01-04 PROCEDURE — 93005 ELECTROCARDIOGRAM TRACING: CPT

## 2020-01-04 PROCEDURE — 85025 COMPLETE CBC W/AUTO DIFF WBC: CPT | Performed by: EMERGENCY MEDICINE

## 2020-01-04 PROCEDURE — 25000132 ZZH RX MED GY IP 250 OP 250 PS 637: Mod: GY | Performed by: INTERNAL MEDICINE

## 2020-01-04 PROCEDURE — 84484 ASSAY OF TROPONIN QUANT: CPT | Performed by: EMERGENCY MEDICINE

## 2020-01-04 PROCEDURE — 93306 TTE W/DOPPLER COMPLETE: CPT | Mod: 26 | Performed by: INTERNAL MEDICINE

## 2020-01-04 PROCEDURE — 99220 ZZC INITIAL OBSERVATION CARE,LEVL III: CPT | Performed by: INTERNAL MEDICINE

## 2020-01-04 PROCEDURE — G0378 HOSPITAL OBSERVATION PER HR: HCPCS

## 2020-01-04 PROCEDURE — 93306 TTE W/DOPPLER COMPLETE: CPT

## 2020-01-04 PROCEDURE — 99285 EMERGENCY DEPT VISIT HI MDM: CPT | Mod: 25

## 2020-01-04 PROCEDURE — 84443 ASSAY THYROID STIM HORMONE: CPT | Performed by: EMERGENCY MEDICINE

## 2020-01-04 PROCEDURE — 81001 URINALYSIS AUTO W/SCOPE: CPT | Performed by: EMERGENCY MEDICINE

## 2020-01-04 PROCEDURE — 84484 ASSAY OF TROPONIN QUANT: CPT | Mod: 91 | Performed by: INTERNAL MEDICINE

## 2020-01-04 RX ORDER — TRAZODONE HYDROCHLORIDE 50 MG/1
50 TABLET, FILM COATED ORAL
Status: DISCONTINUED | OUTPATIENT
Start: 2020-01-04 | End: 2020-01-05 | Stop reason: HOSPADM

## 2020-01-04 RX ORDER — DIAZEPAM 2 MG
2 TABLET ORAL AT BEDTIME
Status: DISCONTINUED | OUTPATIENT
Start: 2020-01-04 | End: 2020-01-05 | Stop reason: HOSPADM

## 2020-01-04 RX ORDER — ACETAMINOPHEN 325 MG/1
650 TABLET ORAL EVERY 4 HOURS PRN
Status: DISCONTINUED | OUTPATIENT
Start: 2020-01-04 | End: 2020-01-05 | Stop reason: HOSPADM

## 2020-01-04 RX ORDER — ACETAMINOPHEN 650 MG/1
650 SUPPOSITORY RECTAL EVERY 4 HOURS PRN
Status: DISCONTINUED | OUTPATIENT
Start: 2020-01-04 | End: 2020-01-05 | Stop reason: HOSPADM

## 2020-01-04 RX ORDER — TRAZODONE HYDROCHLORIDE 50 MG/1
50 TABLET, FILM COATED ORAL AT BEDTIME
Status: DISCONTINUED | OUTPATIENT
Start: 2020-01-04 | End: 2020-01-04

## 2020-01-04 RX ORDER — LIDOCAINE 40 MG/G
CREAM TOPICAL
Status: DISCONTINUED | OUTPATIENT
Start: 2020-01-04 | End: 2020-01-05 | Stop reason: HOSPADM

## 2020-01-04 RX ORDER — NITROGLYCERIN 0.4 MG/1
0.4 TABLET SUBLINGUAL EVERY 5 MIN PRN
Status: DISCONTINUED | OUTPATIENT
Start: 2020-01-04 | End: 2020-01-05 | Stop reason: HOSPADM

## 2020-01-04 RX ORDER — ONDANSETRON 4 MG/1
4 TABLET, ORALLY DISINTEGRATING ORAL EVERY 6 HOURS PRN
Status: DISCONTINUED | OUTPATIENT
Start: 2020-01-04 | End: 2020-01-05 | Stop reason: HOSPADM

## 2020-01-04 RX ORDER — ONDANSETRON 2 MG/ML
4 INJECTION INTRAMUSCULAR; INTRAVENOUS EVERY 6 HOURS PRN
Status: DISCONTINUED | OUTPATIENT
Start: 2020-01-04 | End: 2020-01-05 | Stop reason: HOSPADM

## 2020-01-04 RX ORDER — SODIUM CHLORIDE 9 MG/ML
1000 INJECTION, SOLUTION INTRAVENOUS CONTINUOUS
Status: DISCONTINUED | OUTPATIENT
Start: 2020-01-04 | End: 2020-01-04

## 2020-01-04 RX ORDER — QUETIAPINE FUMARATE 25 MG/1
50-100 TABLET, FILM COATED ORAL
Status: DISCONTINUED | OUTPATIENT
Start: 2020-01-04 | End: 2020-01-05 | Stop reason: HOSPADM

## 2020-01-04 RX ORDER — LEVOTHYROXINE SODIUM 25 UG/1
25 TABLET ORAL DAILY
Status: DISCONTINUED | OUTPATIENT
Start: 2020-01-05 | End: 2020-01-05 | Stop reason: HOSPADM

## 2020-01-04 RX ORDER — SODIUM CHLORIDE 9 MG/ML
INJECTION, SOLUTION INTRAVENOUS CONTINUOUS
Status: DISCONTINUED | OUTPATIENT
Start: 2020-01-04 | End: 2020-01-05 | Stop reason: HOSPADM

## 2020-01-04 RX ORDER — ASPIRIN 81 MG/1
81 TABLET ORAL DAILY
Status: DISCONTINUED | OUTPATIENT
Start: 2020-01-04 | End: 2020-01-05 | Stop reason: HOSPADM

## 2020-01-04 RX ADMIN — DIAZEPAM 2 MG: 2 TABLET ORAL at 22:23

## 2020-01-04 RX ADMIN — TRAZODONE HYDROCHLORIDE 25 MG: 50 TABLET ORAL at 21:37

## 2020-01-04 RX ADMIN — SODIUM CHLORIDE: 9 INJECTION, SOLUTION INTRAVENOUS at 21:56

## 2020-01-04 RX ADMIN — SODIUM CHLORIDE 1000 ML: 9 INJECTION, SOLUTION INTRAVENOUS at 10:44

## 2020-01-04 RX ADMIN — SODIUM CHLORIDE: 9 INJECTION, SOLUTION INTRAVENOUS at 13:08

## 2020-01-04 RX ADMIN — ACETAMINOPHEN 650 MG: 325 TABLET, FILM COATED ORAL at 18:23

## 2020-01-04 RX ADMIN — ASPIRIN 81 MG: 81 TABLET, DELAYED RELEASE ORAL at 13:32

## 2020-01-04 RX ADMIN — SODIUM CHLORIDE 1000 ML: 9 INJECTION, SOLUTION INTRAVENOUS at 09:49

## 2020-01-04 RX ADMIN — ACETAMINOPHEN 650 MG: 325 TABLET, FILM COATED ORAL at 22:23

## 2020-01-04 ASSESSMENT — ENCOUNTER SYMPTOMS
VOMITING: 1
NAUSEA: 1
FEVER: 0
CONSTIPATION: 0
NECK PAIN: 0
DIARRHEA: 0
WEAKNESS: 1
ABDOMINAL PAIN: 0
ARTHRALGIAS: 0
BACK PAIN: 0
WOUND: 0

## 2020-01-04 ASSESSMENT — MIFFLIN-ST. JEOR: SCORE: 948.96

## 2020-01-04 NOTE — H&P
[facility]    History and Physical  Hospitalist       Date of Admission:  1/4/2020  Date of Service (when I saw the patient): 01/04/20    Assessment & Plan   Carmen Zhang is a 79 year old female who presents with syncope    Syncope  In ED 4/2019 with syncopal episode. Vomiting x 4 evening PTA. This am awoke to use bathroom and with syncopal episode after standing. Orthostatics in the ED with drop in BP in ED to 66 systolic with compensatory tachycardia. Exam unremarkable. BUN sl elevated, troponin negative. Other labs normal. UA bland. EKG with ? Mild t-wave inversion V1-3  - repeat EKG in the am  - check troponin tonight and in the am  - check TSH  - telemetry  - IV fluids  - repeat orthostatic BP's in the am   - consider echo if orthostatics fail to improve with hydration and holding meds  - check echocardiogram    HTN  HLD  PTA ASA 81 mg, atorvastatin 10 mg daily, hydrochlorothiazide 25 mg daily  Concern given orthostatics and dehydration and diuretic.   - hold hydrochlorothiazide (she is on this for Meniere's disease as well)  - hold statin while obs  - continue ASA    Meniere's disease  PTA diazepam 2 mg at HS and continue    Hypothyroidism  PTA levothyroxine 25 mg daily  - continue current dose  - check TSH as above    Insomnia  PTA  seroquel  mg at HS prn  Relatively high dose of seroquel if for sleep. Also up to 10% of pts can have orthostatic hypotension with this drug  - recommend taper and trial of trazodone    DVT Prophylaxis: Pneumatic Compression Devices and Ambulate every shift  Code Status: Full Code    Disposition: Expected discharge in 1-2 days    Satnam Kenyon MD  [phone] (P)  Text Page     Primary Care Physician   Dr. Martinez Jenkins    Chief Complaint   syncope    History is obtained from the patient and medical records    History of Present Illness   Carmen Zhang is a 79 year old female who presents with syncope this past medical history markable  for hypertension, hyperlipidemia, Ménière's disease, hypothyroidism and insomnia.  The evening prior to presentation she had an episode of nausea and vomiting.  She thought this was secondary to some bad food that she ate.  She had a couple more episodes of vomiting prior to going to bed early.  This morning she got up to go to the bathroom and had a syncopal episode in the bathroom.  This is not  while using the bathroom.  She denies any chest pain, palpitations dizziness or lightheadedness around that time.  There is some mild confusion.  Her  came and found her.  She had some mild abdominal tenderness but not currently.  She denies any shortness of breath.  No current nausea.  No diarrhea.  No recent fevers chills or diaphoresis.  Denies urinary symptoms or edema.    Past Medical History    I have reviewed this patient's medical history and updated it with pertinent information if needed.   Past Medical History:   Diagnosis Date     Female stress incontinence      Herpes simplex without mention of complication      Lumbago 2006     Meniere's disease      Other ovarian failure      Personal history of other disorders of nervous system and sense organs        Past Surgical History   I have reviewed this patient's surgical history and updated it with pertinent information if needed.  Past Surgical History:   Procedure Laterality Date     BREAST BIOPSY, RT/LT       C APPENDECTOMY       C NONSPECIFIC PROCEDURE  33 yrs old    removal of growth on heart      C NONSPECIFIC PROCEDURE      bladder suspension     C NONSPECIFIC PROCEDURE      vein stripping     HYSTERECTOMY, VAGINAL      ovaries left       Prior to Admission Medications   Prior to Admission Medications   Prescriptions Last Dose Informant Patient Reported? Taking?   ASPIRIN 81 MG OR TABS 1/3/2020 at PM Self Yes Yes   Sig: Take 81 mg by mouth daily    COQ10 100 MG OR CAPS  Self Yes Yes   Si teaspoonsful of Qunol-liquid daily.   FISH OIL 1000 MG  OR CAPS 1/3/2020 at Unknown time Self Yes Yes   Sig: Take 1 g by mouth daily    Lutein 20 MG TABS 1/3/2020 at Unknown time Self Yes Yes   Sig: Take 1 tablet by mouth daily (with breakfast) .   MULTIVITAMIN TABS   OR 1/3/2020 at Unknown time Self Yes Yes   Sig: Take 1 tablet by mouth daily    Melatonin 10 MG TABS 1/3/2020 at HS Self Yes Yes   Sig: Take 10 mg by mouth At Bedtime    QUEtiapine (SEROQUEL) 50 MG tablet 1/2/2020 at Unknown time Self No Yes   Sig: TAKE 1 TO 2 TABLETS ( 50  MG) NIGHTLY AS NEEDED FOR SLEEP AID   UROCIT-K 10 MEQ (1080 MG) CR tablet 1/3/2020 at PM Self No Yes   Sig: TAKE 1 TABLET THREE TIMES A DAY WITH MEALS FOR POTASSIUM SUPPLEMENTATION   atorvastatin (LIPITOR) 10 MG tablet 1/3/2020 at PM Self No Yes   Sig: Take 1 tablet (10 mg) by mouth daily Pt is requesting that she only receive M[employer] Brand.   calcium-vitamin D (CALCIUM 600 + D) 600-400 MG-UNIT per tablet 1/3/2020 at AM Self No Yes   Sig: Take 1 tablet by mouth 2 times daily FOR BONE HEALTH AND FOR VITAMIN D DEFICIENCY (LOW VITAMIN D)   carboxymethylcellulose PF (CARBOXYMETHYLCELLULOSE SODIUM) 0.5 % ophthalmic solution  Self No Yes   Sig: Place 1 drop into both eyes 4 times daily   cholecalciferol 2000 UNITS CAPS 1/3/2020 at Unknown time Self No Yes   Sig: Take 1 capsule by mouth daily   cyabnocobalamin (VITAMIN B-12) 2500 MCG sublingual tablet 1/3/2020 at AM Self No Yes   Sig: Take 2,500 mcg by mouth daily INDICATION: FOR VITAMIN B12 SUPPLEMENTATION - TO IMPROVE MEMORY, BALANCE, SLEEP AND MOOD, DIRECTIONS: PLEASE PLACE UNDER THE TONGUE TO DISSOLVE   diazepam (VALIUM) 2 MG tablet 1/3/2020 at Unknown time Self No Yes   Sig: Take 1 tablet (2 mg) by mouth At Bedtime   hydrochlorothiazide (HYDRODIURIL) 25 MG tablet 1/3/2020 at AM Self No Yes   Sig: TAKE 1 TABLET EVERY MORNING TO LOWER BLOOD PRESSURE   levothyroxine (SYNTHROID/LEVOTHROID) 25 MCG tablet 1/4/2020 at AM Self Yes Yes   Sig: Take 25 mcg by mouth daily   polyethylene glycol  (MIRALAX) powder 1/3/2020 at Unknown time Self No Yes   Sig: Take 17 g (1 capful) by mouth daily INDICATION: CONSTIPATION, TO ACHIEVE 1-2 SOFT BMs PER DAY   vitamin E (RA VITAMIN E) 1000 UNIT capsule 1/3/2020 at Unknown time Self No Yes   Sig: Take 1 capsule (1,000 Units) by mouth 2 times daily INDICATION: TO HELP IMPROVE MEMORY   Patient taking differently: Take 1,000 Units by mouth daily INDICATION: TO HELP IMPROVE MEMORY      Facility-Administered Medications: None     Allergies   Allergies   Allergen Reactions     Dye [Contrast Dye] Hives and Itching     Iodine Hives     Seasonal Allergies        Social History   I have reviewed this patient's social history and updated it with pertinent information if needed. Carmen Zhang  reports that she quit smoking about 59 years ago. Her smoking use included cigarettes. She has a 39.00 pack-year smoking history. She has never used smokeless tobacco. She reports that she does not drink alcohol or use drugs.    Family History   I have reviewed this patient's family history and updated it with pertinent information if needed.   Family History   Problem Relation Age of Onset     Cancer Mother         ESOPHAGEAL, SMOKER     C.A.D. Mother         MI IN HER 70s     C.A.D. Father         MI IN HIS 70s     Prostate Cancer Father      Neurologic Disorder Father         PARKINSON'S     Cerebrovascular Disease Maternal Grandmother      Blood Disease Maternal Grandfather      Diabetes Maternal Aunt      C.A.D. Brother         11 STENTS AND BYPASS, IN HIS FIFTIES     Obesity Brother      Connective Tissue Disorder Daughter         HIP PRESENTATION     Diabetes Maternal Aunt      Connective Tissue Disorder Daughter         ARTHRITIS       Review of Systems   The 10 point Review of Systems is negative other than noted in the HPI or here.     Physical Exam   Temp: 97.8  F (36.6  C) Temp src: Oral BP: 118/58 Pulse: 100 Heart Rate: 72 Resp: 16 SpO2: 97 % O2 Device: None (Room air)     Vital Signs with Ranges  0 lbs 0 oz    Constitutional: alert, oriented and in no acute distress  Eyes: EOMI, PERRL  HEENT: OP clear  Respiratory: CTA B without w/c  Cardiovascular: RRR without m/r/g  GI: soft, nontender, nondistended, no HSM  Lymph/Hematologic: no cervical LAD  Genitourinary: deferred  Skin: no rashes or lesions grossly  Musculoskeletal: no deformities or arthritis  Neurologic: CN II-XII, GARCIA, sensation grossly intact  Psychiatric: mood and affect wnl    Data   Data reviewed today:  I personally reviewed the EKG tracing showing NSR, ? twave inversions laterally.  Recent Labs   Lab 01/04/20  0938   WBC 7.8   HGB 13.5   MCV 94         POTASSIUM 3.4   CHLORIDE 104   CO2 28   BUN 35*   CR 0.61   ANIONGAP 7   FLAKO 8.8   *   TROPI <0.015       No results found for this or any previous visit (from the past 24 hour(s)).

## 2020-01-04 NOTE — PHARMACY-ADMISSION MEDICATION HISTORY
Pharmacy Medication History  Admission medication history interview status for the 1/4/2020  admission is complete. See EPIC admission navigator for prior to admission medications     Medication history sources: Patient  Medication history source reliability: Good  Adherence assessment: Good    Significant changes made to the medication list:  Removed erythromycin and ofloxacin eye drops       Additional medication history information:   None     Medication reconciliation completed by provider prior to medication history? No    Time spent in this activity: 20 minutes       Prior to Admission medications    Medication Sig Last Dose Taking? Auth Provider   ASPIRIN 81 MG OR TABS Take 81 mg by mouth daily  1/3/2020 at PM Yes Reported, Patient   atorvastatin (LIPITOR) 10 MG tablet Take 1 tablet (10 mg) by mouth daily Pt is requesting that she only receive Mylan Brand. 1/3/2020 at PM Yes Martinez Jenkins MD   calcium-vitamin D (CALCIUM 600 + D) 600-400 MG-UNIT per tablet Take 1 tablet by mouth 2 times daily FOR BONE HEALTH AND FOR VITAMIN D DEFICIENCY (LOW VITAMIN D) 1/3/2020 at AM Yes Martinez Jenkins MD   carboxymethylcellulose PF (CARBOXYMETHYLCELLULOSE SODIUM) 0.5 % ophthalmic solution Place 1 drop into both eyes 4 times daily  Yes Luis Fernando Murdock Chi, LEVI   cholecalciferol 2000 UNITS CAPS Take 1 capsule by mouth daily 1/3/2020 at Unknown time Yes Martinez Jenkins MD   COQ10 100 MG OR CAPS 2 teaspoonsful of Qunol-liquid daily.  Yes Roman Nogueira MD   cyabnocobalamin (VITAMIN B-12) 2500 MCG sublingual tablet Take 2,500 mcg by mouth daily INDICATION: FOR VITAMIN B12 SUPPLEMENTATION - TO IMPROVE MEMORY, BALANCE, SLEEP AND MOOD, DIRECTIONS: PLEASE PLACE UNDER THE TONGUE TO DISSOLVE 1/3/2020 at AM Yes Martinez Jenkins MD   diazepam (VALIUM) 2 MG tablet Take 1 tablet (2 mg) by mouth At Bedtime 1/3/2020 at Unknown time Yes Martinez Jenkins MD   FISH OIL 1000 MG OR CAPS Take 1 g by mouth daily  1/3/2020 at  Unknown time Yes Reported, Patient   hydrochlorothiazide (HYDRODIURIL) 25 MG tablet TAKE 1 TABLET EVERY MORNING TO LOWER BLOOD PRESSURE 1/3/2020 at AM Yes Martinez Jenkins MD   levothyroxine (SYNTHROID/LEVOTHROID) 25 MCG tablet Take 25 mcg by mouth daily 1/4/2020 at AM Yes Reported, Patient   Lutein 20 MG TABS Take 1 tablet by mouth daily (with breakfast) . 1/3/2020 at Unknown time Yes Reported, Patient   Melatonin 10 MG TABS Take 10 mg by mouth At Bedtime  1/3/2020 at HS Yes Reported, Patient   MULTIVITAMIN TABS   OR Take 1 tablet by mouth daily  1/3/2020 at Unknown time Yes Reported, Patient   polyethylene glycol (MIRALAX) powder Take 17 g (1 capful) by mouth daily INDICATION: CONSTIPATION, TO ACHIEVE 1-2 SOFT BMs PER DAY 1/3/2020 at Unknown time Yes Martinez Jenkins MD   QUEtiapine (SEROQUEL) 50 MG tablet TAKE 1 TO 2 TABLETS ( 50  MG) NIGHTLY AS NEEDED FOR SLEEP AID 1/2/2020 at Unknown time Yes Martinez Jenkins MD   UROCIT-K 10 MEQ (1080 MG) CR tablet TAKE 1 TABLET THREE TIMES A DAY WITH MEALS FOR POTASSIUM SUPPLEMENTATION 1/3/2020 at PM Yes Martinez Jenkins MD   vitamin E (RA VITAMIN E) 1000 UNIT capsule Take 1 capsule (1,000 Units) by mouth 2 times daily INDICATION: TO HELP IMPROVE MEMORY  Patient taking differently: Take 1,000 Units by mouth daily INDICATION: TO HELP IMPROVE MEMORY 1/3/2020 at Unknown time Yes Martinez Jenkins MD Elham Said   Student Pharmacist

## 2020-01-04 NOTE — PROGRESS NOTES
Pt alert to self, confused on name of facility and date, thinks it is Thursday and but able to state the correct year.    Pt denies pain, states she passed out and fell to floor but denies pain and skin in good condition.  Pt ate well at lunch.  Bed alarm on for safety.  Pt  states they brought home meds and advised to take back home.  Pt denies feelling light headed and dizziness.  Pt Iv fluids infusing, up with SBA and walker.  Echo compeleted. Continue to monitor and POC

## 2020-01-04 NOTE — ED PROVIDER NOTES
"  History     Chief Complaint:  Syncope      HPI   Carmen Zhang is a 79 year old female who presents with syncope. Last night, patient had four episodes of repeated emesis. She had no abdominal pain.  adds that, just prior to the vomiting, Carmen Davila did eat some corn chowder that may have been . This morning, she awoke to use the bathroom and had a syncopal episode after standing from the bed -- she had gotten up to urinate.  was in bed, but heard her fall. He got up, picked the patient up, and laid her in bed. He describes that she was \"half in and out\" of consciousness while lying there. She rested, and then she and her  presented to the emergency department. Here, she complains of weakness. Patient denies any diarrhea, constipation, fever, urinary symptoms, and denies that she was injured in her fall. Of note, patient had an episode of syncope five weeks ago as well and her  is concerned that this has happened again. She has not had any medication changes, but patient's  expresses concerns over patient's medications -- he thinks some of her medications are not appropriate for her, including the 2mg Valium. He states that their daughters are also concerned about this, and have researched the patient's medications extensively. Additionally, her  states that he had an episode of diarrhea five days ago, but did not vomit. He wonders if he passed a virus onto her.     Allergies:  Dye [Contrast Dye]  Seasonal Allergies      Medications:    Aspirin 81mg   Atorvastatin   Valium   Hydrochlorothiazide   Levothyroxine   Miralax   Seroquel     Past Medical History:    Female stress incontinence   Herpes simplex without mention of complication   Lumbago   Meniere's disease, bilateral   Other ovarian failure   Personal history of other disorders of nervous system and sense organs   Hypertension   Hypervitaminosis D  Osteoporosis   Atrophic vaginitis   Constipation   Memory " problem     Past Surgical History:    Breast biopsy, Rt/Lt   Appendectomy    Removal of growth on heart   Bladder suspension   Vein stripping   Hysterectomy and left ovarian removal    Family History:    Mother - esophageal cancer, smoker, coronary artery disease, myocardial infarction   Father - myocardial infarction, prostate cancer, Parkinson's disease  Brother - coronary artery disease, obesity   Daughter - connective tissue disorder, arthritis     Social History:  The patient was accompanied to the ED by .  Smoking Status: Former smoker 3.00 PPD for 13 years  Smokeless Tobacco: Never  Alcohol Use: No  Marital Status:        Review of Systems   Constitutional: Negative for fever.   Gastrointestinal: Positive for nausea and vomiting. Negative for abdominal pain, constipation and diarrhea.   Genitourinary: Negative.    Musculoskeletal: Negative for arthralgias, back pain and neck pain.   Skin: Negative for wound.   Neurological: Positive for syncope and weakness.   All other systems reviewed and are negative.        Physical Exam     Patient Vitals for the past 24 hrs:   BP Temp Temp src Pulse Heart Rate Resp SpO2   01/04/20 1000 118/58 -- -- -- 72 16 97 %   01/04/20 0945 (!) 66/41 -- -- 100 -- -- --   01/04/20 0944 108/52 -- -- 72 -- -- 97 %   01/04/20 0923 111/50 97.8  F (36.6  C) Oral 80 -- 18 96 %       Physical Exam  GENERAL: well developed, pleasant  HEAD: atraumatic  EYES: pupils reactive, extraocular muscles intact, conjunctivae normal  ENT:  mucus membranes moist  NECK:  trachea midline, normal range of motion  RESPIRATORY: no tachypnea, breath sounds clear to auscultation   CVS: normal S1/S2, no murmurs, intact distal pulses  ABDOMEN: soft, nontender, nondistention  MUSCULOSKELETAL: no deformities  SKIN: warm and dry, no acute rashes or ulceration  NEURO: GCS 15, cranial nerves intact, alert and oriented x3  PSYCH:  Mood/affect normal      Emergency Department Course     ECG:  Indication:  Syncope   Completed at 0950.  Read at 1000.   Sinus rhythm with marked sinus arrhythmia   Otherwise normal ECG   Rate 77 bpm. GA interval 140. QRS duration 74. QT/QTc 388/439. P-R-T axes 83 73 63.    Laboratory:  Laboratory findings were communicated with the patient, family and Admitting MD who voiced understanding of the findings.    CBC: AWNL. (WBC 7.8, HGB 13.5, )   BMP: Glucose 128 (H), BUN 35 (H) o/w WNL (Creatinine 0.61)     Troponin (Collected 0938): <0.015  UA: Yellow, clear urine. Ketones urine 5, protein albumin urine 10, RBC urine 6 (H), mucous urine present o/w WNL     Interventions:  0949 NS Bolus 1,000mL IV   1044 NS infusion 1,000mL IV     Emergency Department Course:  Past medical records, nursing notes, and vitals reviewed.    0930 I performed an exam of the patient as documented above.     EKG obtained in the ED, see results above.   IV was inserted and blood was drawn for laboratory testing, results above.  The patient provided a urine sample here in the emergency department. This was sent for laboratory testing, findings above.    1038 I rechecked the patient and discussed the results of her workup thus far.     1055 I spoke with Dr. Kenyon of the Hospitalist service regarding patient's presentation, findings, and plan of care.    Findings and plan explained to the Patient and spouse who consents to admission. Discussed the patient with Dr. Kenyon, who will admit the patient to an observation bed for further monitoring, evaluation, and treatment.    I personally reviewed the laboratory results with the Patient and spouse and answered all related questions prior to admission.     Impression & Plan     Medical Decision Making:    Presents with episode of syncope.   notes that she was here within the last few months with syncope as well.  He is concerned about the medications that she is on although the patient does not share his same concerns.  Patient had 4 episodes of  vomiting yesterday without diarrhea.  Unclear if it was related to corn chowder soup.  Patient does take to sleeping aids in the evening as well as a diuretic for her blood pressure.  This morning she woke up and needed to go to the bathroom to urinate.  She does not remember a prodrome leading up to the syncope.   heard a noise and found her on the floor.  He was able to get her back up in the bed and eventually brought her in for evaluation.  Patient denies any trauma from the injury/syncope and denies headache or bumps or bruises to her head.  Patient had orthostatics checked with a drop to 66 systolic with pulse rate changes well.  Patient was given a liter of normal saline.  Patient's prior syncopal episode was noted to be in the morning about 6 months ago.    Diagnosis:    ICD-10-CM    1. Syncope, unspecified syncope type R55 Troponin I     UA with Microscopic   2. Orthostatic hypotension I95.1        Disposition:  Admitted to observation.    Scribe Disclosure:  Mariana ALBRIGHT, am serving as a scribe at 9:29 AM on 1/4/2020 to document services personally performed by Hossein Guerin MD based on my observations and the provider's statements to me.   1/4/2020    EMERGENCY DEPARTMENT       Hossein Guerin MD  01/04/20 [id number]

## 2020-01-04 NOTE — ED TRIAGE NOTES
Pt had vomiting episode last night and this morning  heard pt fall in the bathroom. Pt reports had syncopal epidsode prior to fall.

## 2020-01-04 NOTE — ED NOTES
[facility]  ED Nurse Handoff Report    ED Chief complaint: Syncope      ED Diagnosis:   Final diagnoses:   None       Code Status: see epic    Allergies:   Allergies   Allergen Reactions     Dye [Contrast Dye] Hives and Itching     Iodine Hives     Seasonal Allergies        Patient Story: pt lives at home with . Reports 4 emesis last night, and the last time she had syncopal episode. No injury from fall.  heard pt and was able to assist her back to bed.  states this is unusual for pt and thus came to the ED.   Focused Assessment:  AOX4. Generalized weakness. C/o right chronic shoulder pain. Denies any abd pain or nausea. No emesis in ED. Orthostatic SBP dropped significantly and HR increased by 30, pt c/o feeling dizzy while standing.    Treatments and/or interventions provided: YOGI DERAS  Patient's response to treatments and/or interventions: pt resting comfortably in bed    To be done/followed up on inpatient unit:  monitor    Does this patient have any cognitive concerns?: none    Activity level - Baseline/Home:  Stand with Assist  Activity Level - Current:   Stand with Assist    Patient's Preferred language: English   Needed?: No    Isolation: None  Infection: Not Applicable  Bariatric?: No    Vital Signs:   Vitals:    01/04/20 0923 01/04/20 0944 01/04/20 0945   BP: 111/50 108/52 (!) 66/41   Pulse: 80 72 100   Resp: 18     Temp: 97.8  F (36.6  C)     TempSrc: Oral     SpO2: 96% 97%        Cardiac Rhythm:     Was the PSS-3 completed:   Yes  What interventions are required if any?  none             Family Comments:  at bedside  OBS brochure/video discussed/provided to patient/family: Yes              Name of person given brochure if not patient: na              Relationship to patient: na    For the majority of the shift this patient's behavior was Green.   Behavioral interventions performed were none.    ED NURSE PHONE NUMBER: [phone]

## 2020-01-04 NOTE — PROGRESS NOTES
RECEIVING UNIT ED HANDOFF REVIEW    ED Nurse Handoff Report was reviewed by: Beulah Thakkar RN on January 4, 2020 at 11:14 AM

## 2020-01-05 ENCOUNTER — APPOINTMENT (OUTPATIENT)
Dept: PHYSICAL THERAPY | Facility: CLINIC | Age: 80
End: 2020-01-05
Attending: INTERNAL MEDICINE
Payer: MEDICARE

## 2020-01-05 VITALS
OXYGEN SATURATION: 96 % | TEMPERATURE: 96.7 F | RESPIRATION RATE: 16 BRPM | DIASTOLIC BLOOD PRESSURE: 51 MMHG | SYSTOLIC BLOOD PRESSURE: 143 MMHG | WEIGHT: 117 LBS | HEIGHT: 62 IN | HEART RATE: 59 BPM | BODY MASS INDEX: 21.53 KG/M2

## 2020-01-05 LAB
ANION GAP SERPL CALCULATED.3IONS-SCNC: 3 MMOL/L (ref 3–14)
BUN SERPL-MCNC: 10 MG/DL (ref 7–30)
CALCIUM SERPL-MCNC: 8 MG/DL (ref 8.5–10.1)
CHLORIDE SERPL-SCNC: 115 MMOL/L (ref 94–109)
CO2 SERPL-SCNC: 26 MMOL/L (ref 20–32)
CREAT SERPL-MCNC: 0.47 MG/DL (ref 0.52–1.04)
GFR SERPL CREATININE-BSD FRML MDRD: >90 ML/MIN/{1.73_M2}
GLUCOSE SERPL-MCNC: 101 MG/DL (ref 70–99)
POTASSIUM SERPL-SCNC: 3.5 MMOL/L (ref 3.4–5.3)
SODIUM SERPL-SCNC: 144 MMOL/L (ref 133–144)
TROPONIN I SERPL-MCNC: 0.02 UG/L (ref 0–0.04)

## 2020-01-05 PROCEDURE — 25800030 ZZH RX IP 258 OP 636: Performed by: INTERNAL MEDICINE

## 2020-01-05 PROCEDURE — 25000132 ZZH RX MED GY IP 250 OP 250 PS 637: Mod: GY | Performed by: INTERNAL MEDICINE

## 2020-01-05 PROCEDURE — 84484 ASSAY OF TROPONIN QUANT: CPT | Performed by: INTERNAL MEDICINE

## 2020-01-05 PROCEDURE — 36415 COLL VENOUS BLD VENIPUNCTURE: CPT | Performed by: INTERNAL MEDICINE

## 2020-01-05 PROCEDURE — 99207 ZZC CDG-CODE CATEGORY CHANGED: CPT | Performed by: PHYSICIAN ASSISTANT

## 2020-01-05 PROCEDURE — 80048 BASIC METABOLIC PNL TOTAL CA: CPT | Performed by: INTERNAL MEDICINE

## 2020-01-05 PROCEDURE — 96361 HYDRATE IV INFUSION ADD-ON: CPT

## 2020-01-05 PROCEDURE — 99217 ZZC OBSERVATION CARE DISCHARGE: CPT | Performed by: PHYSICIAN ASSISTANT

## 2020-01-05 PROCEDURE — G0378 HOSPITAL OBSERVATION PER HR: HCPCS

## 2020-01-05 PROCEDURE — 97161 PT EVAL LOW COMPLEX 20 MIN: CPT | Mod: GP

## 2020-01-05 RX ORDER — QUETIAPINE FUMARATE 50 MG/1
25 TABLET, FILM COATED ORAL AT BEDTIME
Qty: 180 TABLET | Refills: 1 | COMMUNITY
Start: 2020-01-05 | End: 2021-06-29

## 2020-01-05 RX ORDER — TRAZODONE HYDROCHLORIDE 50 MG/1
50 TABLET, FILM COATED ORAL AT BEDTIME
Qty: 30 TABLET | Refills: 0 | Status: ON HOLD | OUTPATIENT
Start: 2020-01-05 | End: 2023-04-30

## 2020-01-05 RX ADMIN — ASPIRIN 81 MG: 81 TABLET, DELAYED RELEASE ORAL at 09:03

## 2020-01-05 RX ADMIN — SODIUM CHLORIDE: 9 INJECTION, SOLUTION INTRAVENOUS at 07:49

## 2020-01-05 RX ADMIN — LEVOTHYROXINE SODIUM 25 MCG: 25 TABLET ORAL at 09:03

## 2020-01-05 NOTE — PLAN OF CARE
PRIMARY DIAGNOSIS: SYNCOPE/TIA  OUTPATIENT/OBSERVATION GOALS TO BE MET BEFORE DISCHARGE:  1. Orthostatic performed: Yes:          Lying Orthostatic BP: 143/60         Sitting Orthostatic BP: 123/5         Standing Orthostatic BP: 121/59     2. Diagnostic testing complete & at baseline neurologic testing: Yes    3. Cleared by consultants (if involved): No    4. Interpretation of cardiac rhythm per telemetry tech: SR    5. Tolerating adequate PO diet and medications: Yes    6. Return to near baseline physical activity or neurologic status: No    Discharge Planner Nurse   Safe discharge environment identified: No  Barriers to discharge: Yes       Entered by: Beulah Thakkar 01/05/2020 9:42 AM     Please review provider order for any additional goals.   Nurse to notify provider when observation goals have been met and patient is ready for discharge.

## 2020-01-05 NOTE — PROGRESS NOTES
Pt a&o, up indep in room, denies pain, ate well at meals.  Pt denies lightheadedness or dizziness.  IV SL.  Pt possible discharge home in afternoon.  Continue to monitor and POC

## 2020-01-05 NOTE — PROGRESS NOTES
Diagnostic tests and consults completed and resulted   - No further episodes of syncope and any new arrhythmia addressed with controlled heart rates:In progress     - Vital signs normal or at patient baseline and orthostatic vitals are normal and patient not lightheaded with standing:In progress     - Tolerating oral intake to maintain hydration:MET   - Safe disposition plan has been identified:Not met

## 2020-01-05 NOTE — DISCHARGE SUMMARY
"[facility]    Discharge Summary  Hospitalist    Date of Admission:  1/4/2020  Date of Discharge:  1/5/2020  Discharging Provider: Karen Gaxiola PA-C    Discharge Diagnoses   Syncope  Orthostatic hypotension  Insomnia  Hypertension  Meniere's disease  hypothryoidism    History of Present Illness   Cramen Zhang is an 79 year old female with a past medical history significant for hypertension, HLD, meniere's disease, insomnia who presented to the Emergency Department after a syncopal episode. The patient reports the evening prior to admission she ate some \"bad food\" and subsequently developed nausea with four episodes of emesis. She was unable to tolerate po intake after this. Early the morning of admission she got up to go to the bathroom and felt \"out of it\" upon standing and had a syncopal episodes. Denied preceding palpitations, CP, SOB. Following the episode her  brought her to the ED for evaluation. Please see admission H&P by Dr. Kenyon for additional information.     On day of discharge patient is feeling well. She denies dizziness, nausea, vomiting. She is tolerating good po intake without any residual GI issues. Ambulating well. No CP, palpitations.     Hospital Course   Carmen Zhang was admitted on 1/4/2020.  The following problems were addressed during her hospitalization:    Syncope  Overall most suspicious this is related to orthostatic hypotension as orthostatic vitals significantly positive in the ED. She reports vomiting x 4 evening PTA. Morning of admission awoke to use bathroom and with syncopal episode after standing. Orthostatics in the ED with drop in BP in ED to 66 systolic with compensatory tachycardia. Exam unremarkable. BUN sl elevated, troponin negative x2. Other labs normal. UA bland. EKG with ? Mild t-wave inversion V1-3. Note In ED 4/2019 with syncopal episode as well. She was hydrated with improvement in orthostatic vitals. Also notably taking " Seroquel which can contribute to orthostatic hypotension.   - telemetry shows NSR- discussed consideration of outpatient cardiac monitor as this is second syncopal episode this year, they prefer to discuss this with PCP this week  --ECHO unchanged  -- encouraged po hydration at home  --will hold hydrochlorothiazide until follow up to ensure adequate hydration   --plan to taper off Seroquel and replace with trazodone. She will take 25mg for two days then stop and start trazodone at that time    HTN  HLD  PTA ASA 81 mg, atorvastatin 10 mg daily, hydrochlorothiazide 25 mg daily  Concern given orthostatics and dehydration and diuretic.   - hold hydrochlorothiazide (she is on this for Meniere's disease as well) until she follows up with PCP   - resume statin at discharge   - continue ASA     Meniere's disease  PTA diazepam 2 mg at HS and hydrochlorothiazide. Discussed recommendation for decrease in valium dose. Per patient she has been working with PCP on reducing dose over time. She is very opposed to dose reduction today and prefers to discuss with PCP.      Hypothyroidism  PTA levothyroxine 25 mg daily  - continue current dose  - check TSH as above     Insomnia  PTA  seroquel  mg at HS prn  Up to 10% of pts can have orthostatic hypotension with this drug  - plan to taper off Seroquel and replace with trazodone. She will take 25mg for two days then stop and start trazodone at that time    This patient was seen and examined with Dr. Garsia who agrees with the above plan.    Karen Gaxiola PA-C, PA-C    Significant Results and Procedures   See below       Code Status   Full Code       Primary Care Physician   Martinez Jenkins    Physical Exam   Temp: 96.5  F (35.8  C) Temp src: Oral BP: 139/51 Pulse: 64 Heart Rate: 65 Resp: 16 SpO2: 95 % O2 Device: None (Room air)    Vitals:    01/04/20 1227   Weight: 53.1 kg (117 lb)     Vital Signs with Ranges  Temp:  [96.5  F (35.8  C)-97.8  F (36.6  C)] 96.5  F (35.8   C)  Pulse:  [] 64  Heart Rate:  [62-82] 65  Resp:  [16-18] 16  BP: ()/(41-67) 139/51  SpO2:  [95 %-97 %] 95 %  I/O last 3 completed shifts:  In: 3005.67 [P.O.:360; I.V.:1645.67; IV Piggyback:1000]  Out: -     Constitutional: Alert and oriented, sitting up in chair. Appears comfortable and is pleasantly conversant.   ENT: normal cephalic, slightly dry mucous membranes  Eyes:  EOMI. No nystagmus  Respiratory: Lungs clear to auscultation bilaterally, no increased work of breathing or wheezing  Cardiovascular: Regular rate and rhythm, no murmur, no LE edema, distal pulses +2/4  GI:  active bowel sounds, abdomen soft, non-tender  Skin/Integumen: warm, dry  MSK:  Moves all four extremities. Normal tone. Limited ROM RUE due to chronic shoulder issues   Neuro:  Speech is clear. Face symmetric. No focal deficits.      Discharge Disposition   Discharged to home  Condition at discharge: Stable    Consultations This Hospital Stay   PHYSICAL THERAPY ADULT IP CONSULT    Time Spent on this Encounter   IKaren PA-C, personally saw the patient today and spent greater than 30 minutes discharging this patient.    Discharge Orders   No discharge procedures on file.  Discharge Medications   Current Discharge Medication List      CONTINUE these medications which have NOT CHANGED    Details   ASPIRIN 81 MG OR TABS Take 81 mg by mouth daily       atorvastatin (LIPITOR) 10 MG tablet Take 1 tablet (10 mg) by mouth daily Pt is requesting that she only receive [employer].  Qty: 90 tablet, Refills: 1    Associated Diagnoses: Mixed hyperlipidemia      calcium-vitamin D (CALCIUM 600 + D) 600-400 MG-UNIT per tablet Take 1 tablet by mouth 2 times daily FOR BONE HEALTH AND FOR VITAMIN D DEFICIENCY (LOW VITAMIN D)  Qty: 100 tablet, Refills: PRN    Associated Diagnoses: Asymptomatic postmenopausal status      carboxymethylcellulose PF (CARBOXYMETHYLCELLULOSE SODIUM) 0.5 % ophthalmic solution Place 1 drop into both eyes 4  times daily  Qty: 1 Bottle, Refills: 11    Associated Diagnoses: Dry eye syndrome of both eyes      cholecalciferol 2000 UNITS CAPS Take 1 capsule by mouth daily  Qty: 100 capsule, Refills: PRN    Associated Diagnoses: Vitamin D deficiency      COQ10 100 MG OR CAPS 2 teaspoonsful of Qunol-liquid daily.  Refills: 0      cyabnocobalamin (VITAMIN B-12) 2500 MCG sublingual tablet Take 2,500 mcg by mouth daily INDICATION: FOR VITAMIN B12 SUPPLEMENTATION - TO IMPROVE MEMORY, BALANCE, SLEEP AND MOOD, DIRECTIONS: PLEASE PLACE UNDER THE TONGUE TO DISSOLVE  Qty: 250 tablet, Refills: 3    Associated Diagnoses: Vitamin B12 deficiency (non anemic)      diazepam (VALIUM) 2 MG tablet Take 1 tablet (2 mg) by mouth At Bedtime  Qty: 90 tablet, Refills: 1    Associated Diagnoses: Meniere's disease, bilateral      FISH OIL 1000 MG OR CAPS Take 1 g by mouth daily       hydrochlorothiazide (HYDRODIURIL) 25 MG tablet TAKE 1 TABLET EVERY MORNING TO LOWER BLOOD PRESSURE  Qty: 90 tablet, Refills: 1    Associated Diagnoses: Essential hypertension      levothyroxine (SYNTHROID/LEVOTHROID) 25 MCG tablet Take 25 mcg by mouth daily      Lutein 20 MG TABS Take 1 tablet by mouth daily (with breakfast) .      Melatonin 10 MG TABS Take 10 mg by mouth At Bedtime       MULTIVITAMIN TABS   OR Take 1 tablet by mouth daily       polyethylene glycol (MIRALAX) powder Take 17 g (1 capful) by mouth daily INDICATION: CONSTIPATION, TO ACHIEVE 1-2 SOFT BMs PER DAY  Qty: 1581 g, Refills: 2    Associated Diagnoses: Constipation, unspecified constipation type      QUEtiapine (SEROQUEL) 50 MG tablet TAKE 1 TO 2 TABLETS ( 50  MG) NIGHTLY AS NEEDED FOR SLEEP AID  Qty: 180 tablet, Refills: 1    Associated Diagnoses: Primary insomnia      UROCIT-K 10 MEQ (1080 MG) CR tablet TAKE 1 TABLET THREE TIMES A DAY WITH MEALS FOR POTASSIUM SUPPLEMENTATION  Qty: 200 tablet, Refills: 1    Associated Diagnoses: Essential hypertension      vitamin E (RA VITAMIN E) 1000 UNIT  capsule Take 1 capsule (1,000 Units) by mouth 2 times daily INDICATION: TO HELP IMPROVE MEMORY  Qty: 100 capsule, Refills: PRN    Associated Diagnoses: Memory problem           Allergies   Allergies   Allergen Reactions     Dye [Contrast Dye] Hives and Itching     Iodine Hives     Seasonal Allergies      Data   Most Recent 3 CBC's:  Recent Labs   Lab Test 01/04/20  0938 04/07/19  0515 08/08/17  0857   WBC 7.8 3.8* 4.2   HGB 13.5 13.2 13.7   MCV 94 94 96    146* 193      Most Recent 3 BMP's:  Recent Labs   Lab Test 01/05/20  0629 01/04/20  0938 04/07/19  0515    139 140   POTASSIUM 3.5 3.4 3.3*   CHLORIDE 115* 104 105   CO2 26 28 31   BUN 10 35* 24   CR 0.47* 0.61 0.65   ANIONGAP 3 7 4   FLAKO 8.0* 8.8 9.7   * 128* 99     Most Recent 2 LFT's:  Recent Labs   Lab Test 08/08/17  0857 05/12/16  0844   AST 27 31   ALT 25 35   ALKPHOS 52 50   BILITOTAL 0.6 0.6     Most Recent INR's and Anticoagulation Dosing History:  Anticoagulation Dose History     Recent Dosing and Labs Latest Ref Rng & Units 2/10/2011    INR 0.86 - 1.14 0.92        Most Recent 3 Troponin's:  Recent Labs   Lab Test 01/05/20  0629 01/04/20  2105 01/04/20  0938   TROPI 0.020 <0.015 <0.015     Most Recent Cholesterol Panel:  Recent Labs   Lab Test 08/08/17  0857   CHOL 158   LDL 55   HDL 85   TRIG 92     Most Recent 6 Bacteria Isolates From Any Culture (See EPIC Reports for Culture Details):  Recent Labs   Lab Test 02/19/16  1221 07/13/13  1425   CULT No growth 10,000 to 50,000 colonies/mL Escherichia coli     Most Recent TSH, T4 and A1c Labs:  Recent Labs   Lab Test 01/04/20  0938 08/08/17  0857   TSH 0.88 2.33   T4  --  0.97     Results for orders placed or performed during the hospital encounter of 01/04/20   Echocardiogram Complete    Narrative    [id number]  [id number]  [id number]  [id number]^ROSA^ALYX^TOBIAS           [facility]  Echocardiography Laboratory  [facility] [place] [address]        Name:  MAGGY UP  MRN: [id number]  : 1940  Study Date: 2020 02:12 PM  Age: 79 yrs  Gender: Female  Patient Location: [facility]  Reason For Study: Syncope  Ordering Physician: ALYX LEE  Referring Physician: JAIDEN AUSTIN  Performed By: Jacob Montes De Oca JEFFRY     BSA: 1.5 m2  Height: 62 in  Weight: 116 lb  HR: 90  BP: 148/67 mmHg  _____________________________________________________________________________  __        Procedure  Complete Portable Echo Adult.  _____________________________________________________________________________  __        Interpretation Summary     1. Normal left ventricular size and systolic function. LVEF 55-60%.  2. No regional wall motion abnormalities.  3. Normal right ventricular size and systolic function.  4. No hemodynamically significant valve disease.     Compared to the prior study dated 3/17/2014, there have been no changes.  _____________________________________________________________________________  __        Left Ventricle  The left ventricle is normal in size. There is normal left ventricular wall  thickness. Left ventricular systolic function is normal. The visual ejection  fraction is estimated at 55-60%. Grade I or early diastolic dysfunction. No  regional wall motion abnormalities noted.     Right Ventricle  The right ventricle is normal in size and function.     Atria  The left atrium is mildly dilated. Right atrial size is normal. There is no  color Doppler evidence of an atrial shunt.     Mitral Valve  The mitral valve leaflets appear normal. There is no evidence of stenosis,  fluttering, or prolapse. There is mild (1+) mitral regurgitation.        Tricuspid Valve  Normal tricuspid valve. There is trace tricuspid regurgitation. The right  ventricular systolic pressure is approximated at 46.8 mmHg plus the right  atrial pressure.     Aortic Valve  The aortic valve is trileaflet with aortic valve sclerosis. There is trace  aortic regurgitation. No  hemodynamically significant valvular aortic stenosis.  The peak AoV pressure gradient is 8.0 mmHg.     Pulmonic Valve  The pulmonic valve is not well visualized. There is trace pulmonic valvular  regurgitation.     Vessels  The aortic root is normal size. Normal size ascending aorta. Dilation of the  inferior vena cava is present with abnormal respiratory variation in diameter.     Pericardium  There is no pericardial effusion.        Rhythm  Sinus rhythm was noted.  _____________________________________________________________________________  __  MMode/2D Measurements & Calculations  IVSd: 0.80 cm     LVIDd: 3.7 cm  LVIDs: 2.3 cm  LVPWd: 1.00 cm  FS: 36.3 %  LV mass(C)d: 95.0 grams  LV mass(C)dI: 62.7 grams/m2  Ao root diam: 2.6 cm  LA dimension: 3.9 cm  asc Aorta Diam: 2.7 cm  LA/Ao: 1.5  LVOT diam: 1.9 cm  LVOT area: 2.9 cm2  LA Volume (BP): 54.9 ml  LA Volume Index (BP): 36.1 ml/m2  RWT: 0.55           Doppler Measurements & Calculations  MV E max pepe: 99.8 cm/sec  MV A max pepe: 81.0 cm/sec  MV E/A: 1.2  MV dec time: 0.15 sec  Ao V2 max: 139.8 cm/sec  Ao max P.0 mmHg  Ao V2 mean: 103.0 cm/sec  Ao mean P.6 mmHg  Ao V2 VTI: 31.5 cm  ROOSEVELT(I,D): 2.1 cm2  ROOSEVELT(V,D): 2.1 cm2  LV V1 max P.1 mmHg  LV V1 max: 100.7 cm/sec  LV V1 VTI: 23.1 cm  SV(LVOT): 67.0 ml  SI(LVOT): 44.2 ml/m2  PA acc time: 0.08 sec  TR max pepe: 342.1 cm/sec  TR max P.8 mmHg  Pulm Sys Pepe: 64.7 cm/sec  Pulm Blair Pepe: 54.0 cm/sec  Pulm S/D: 1.2  AV Pepe Ratio (DI): 0.72  ROOSEVELT Index (cm2/m2): 1.4  E/E' av.9  Lateral E/e': 15.6  Medial E/e': 14.2              _____________________________________________________________________________  __        Report approved by: Dr Ileana Huddleston 2020 03:16 PM

## 2020-01-05 NOTE — PROGRESS NOTES
Pt discharge paperwork reviewed with pt and  who both verbalized understanding and any questions answered.  Pt iv removed.  Pt discharge via wheelchair to husbands car.

## 2020-01-05 NOTE — PLAN OF CARE
A&Ox4, forgetful at times.  VSS.  Mechoopda.  Tylenol and heat given for chronic right shoulder pain.  Up with 1 and belt to bathroom.  Denied dizziness.  Orthostatics done x1 and improved from prior.  IVF.  Tele SR.  Troponin negative x2.  Prn Trazadone given at HS for sleep, trying to wean off Seroquel due to possible cause for syncope.

## 2020-01-05 NOTE — PLAN OF CARE
PT: PT orders received, initial eval completed. Patient lives with her  in a house with a split entry. Previously independent with all ADLs and mobility without a device, very active at baseline, uses her Nordic Track machine for an hour a day. She was attending OPPT sessions to address a R RTC tear. Pt presented with a syncopal episode, vomitting. Admitted under OBS status and diagnosed with syncope.     Discharge Planner PT   Patient plan for discharge: home  Current status: Pt demonstrates independence with bed mobility, transfers, gait x 300' without a device and up/down stairs with a single railing. Denies dizziness or light headedness with all mobility tasks.  Barriers to return to prior living situation: none  Recommendations for discharge: home with resumption of OPPT for R RTC tear  Rationale for recommendations: no further acute PT needs identified. Will sign off.       Entered by: Migdalia Queen 01/05/2020 10:13 AM

## 2020-01-05 NOTE — PLAN OF CARE
PRIMARY DIAGNOSIS: SYNCOPE/TIA  OUTPATIENT/OBSERVATION GOALS TO BE MET BEFORE DISCHARGE:  1. Orthostatic performed: Yes:          Lying Orthostatic BP: 143/60         Sitting Orthostatic BP: 123/5         Standing Orthostatic BP: 121/59     2. Diagnostic testing complete & at baseline neurologic testing: Yes    3. Cleared by consultants (if involved): No    4. Interpretation of cardiac rhythm per telemetry tech: SR    5. Tolerating adequate PO diet and medications: Yes    6. Return to near baseline physical activity or neurologic status: No    Discharge Planner Nurse   Safe discharge environment identified: No  Barriers to discharge: Yes       Entered by: Beulah Thakkar 01/05/2020 1:17 PM     Please review provider order for any additional goals.   Nurse to notify provider when observation goals have been met and patient is ready for discharge.

## 2020-01-05 NOTE — PROGRESS NOTES
Observation Goals:    - Diagnostic tests and consults completed and resulted - not met  - No further episodes of syncope and any new arrhythmia addressed with controlled heart rates - ongoing; met at this point  - Vital signs normal or at patient baseline and orthostatic vitals are normal and patient not lightheaded with standing - met  - Tolerating oral intake to maintain hydration - not met  - Safe disposition plan has been identified - not met

## 2020-01-05 NOTE — PLAN OF CARE
AVSS; tele SR; pt up with 1 assist and a gait belt; denied dizziness or lightheadedness; denied pain; IV NS at 100 ml/hr; PT consult ordered; will need repeat orthostatic BPs/HRs in am.

## 2020-01-05 NOTE — PROGRESS NOTES
Diagnostic tests and consults completed and resulted   - No further episodes of syncope and any new arrhythmia addressed with controlled heart rates:Met    - Vital signs normal or at patient baseline and orthostatic vitals are normal and patient not lightheaded with standing:  Met    - Tolerating oral intake to maintain hydration:MET   - Safe disposition plan has been identified:Not met

## 2020-01-05 NOTE — PROGRESS NOTES
"   01/05/20 1000   Quick Adds   Type of Visit Initial PT Evaluation   Living Environment   Lives With spouse   Living Arrangements house   Home Accessibility stairs within home   Number of Stairs, Within Home, Primary 6   Stair Railings, Within Home, Primary railing on right side (ascending)   Transportation Anticipated family or friend will provide   Living Environment Comment split entry home   Self-Care   Usual Activity Tolerance good   Current Activity Tolerance good   Regular Exercise Yes   Activity/Exercise Type other (see comments)  (Nordic track)   Exercise Amount/Frequency 1 hr;daily   Activity/Exercise/Self-Care Comment Pt had been going to OPPT for a torn R RTC   Functional Level Prior   Ambulation 0-->independent   Transferring 0-->independent   Toileting 0-->independent   Bathing 0-->independent   Communication 0-->understands/communicates without difficulty   Swallowing 0-->swallows foods/liquids without difficulty   Cognition 0 - no cognition issues reported   Fall history within last six months no   General Information   Onset of Illness/Injury or Date of Surgery - Date 01/04/20   Referring Physician Satnam Kenyon MD   Patient/Family Goals Statement \"Go home\"   Pertinent History of Current Problem (include personal factors and/or comorbidities that impact the POC) 79 YOF presented with a syncopal episode, vomitting. Admitted under OBS status and diagnosed with syncope. PMH: HTN, HLD, Meniere's disease   Precautions/Limitations fall precautions   Weight-Bearing Status - LUE full weight-bearing   Weight-Bearing Status - RUE full weight-bearing   Weight-Bearing Status - LLE full weight-bearing   Weight-Bearing Status - RLE full weight-bearing   General Observations Pleasant and cooperative   General Info Comments Activity: ambulate with assist   Cognitive Status Examination   Orientation orientation to person, place and time   Level of Consciousness alert   Follows Commands and Answers " "Questions 100% of the time;able to follow multistep instructions   Personal Safety and Judgment intact   Memory intact   Cognitive Comment no concerns   Pain Assessment   Patient Currently in Pain No   Posture    Posture Forward head position   Range of Motion (ROM)   ROM Comment R shoulder limited in all planes by 50% due to R RTC repair, following with OPPT. LUE and BLEs WFL   Strength   Strength Comments R shoulder limited due to R RTC tear. LUE WFL. BLEs grossly 5/5 throughout   Bed Mobility   Bed Mobility Comments Independent   Transfer Skills   Transfer Comments Independent   Gait   Gait Comments Independent gait x 300' with no LOB or gait deficits observed. Up/down 6 stairs with single railing and reciprocal pattern modified independent   Balance   Balance no deficits were identified   Sensory Examination   Sensory Perception no deficits were identified   Coordination   Coordination no deficits were identified   Clinical Impression   Criteria for Skilled Therapeutic Intervention evaluation only   Clinical Presentation Stable/Uncomplicated   Clinical Presentation Rationale see above   Clinical Decision Making (Complexity) Low complexity   Therapy Frequency Other (see comments)  (eval only)   Predicted Duration of Therapy Intervention (days/wks) 1 day   Anticipated Discharge Disposition Home with Outpatient Therapy   Risk & Benefits of therapy have been explained Yes   Patient, Family & other staff in agreement with plan of care Yes   Clinical Impression Comments Pt would benefit from resuming OPPT to address R RTC tear   [facility] LIKECHAR[employer] TM \"6 Clicks\"   2016, Trustees of [facility], under license to [employer].  All rights reserved.   6 Clicks Short Forms Basic Mobility Inpatient Short Form   [facility] AM-PAC  \"6 Clicks\" V.2 Basic Mobility Inpatient Short Form   1. Turning from your back to your side while in a flat bed without using bedrails? 4 - None   2. Moving from lying on your " back to sitting on the side of a flat bed without using bedrails? 4 - None   3. Moving to and from a bed to a chair (including a wheelchair)? 4 - None   4. Standing up from a chair using your arms (e.g., wheelchair, or bedside chair)? 4 - None   5. To walk in hospital room? 4 - None   6. Climbing 3-5 steps with a railing? 4 - None   Basic Mobility Raw Score (Score out of 24.Lower scores equate to lower levels of function) 24   Total Evaluation Time   Total Evaluation Time (Minutes) 15

## 2020-01-06 ENCOUNTER — TELEPHONE (OUTPATIENT)
Dept: INTERNAL MEDICINE | Facility: CLINIC | Age: 80
End: 2020-01-06

## 2020-01-07 NOTE — TELEPHONE ENCOUNTER
ED / Discharge Outreach Protocol    Patient Contact    Attempt # 2    Was call answered?  No.  Left message on voicemail with information to call me back.- unsure if this patient still sees anyone here

## 2020-01-24 ENCOUNTER — HOSPITAL ENCOUNTER (EMERGENCY)
Dept: HOSPITAL 90 - EDH | Age: 80
Discharge: HOME | End: 2020-01-24
Payer: MEDICARE
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
## 2020-01-24 DIAGNOSIS — Z90.710: ICD-10-CM
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
## 2020-01-24 DIAGNOSIS — Z91.041: ICD-10-CM

## 2020-01-24 DIAGNOSIS — Z87.891: ICD-10-CM
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
## 2020-01-24 DIAGNOSIS — S01.111A: Primary | ICD-10-CM

## 2020-01-24 PROCEDURE — 12011 RPR F/E/E/N/L/M 2.5 CM/<: CPT

## 2020-02-11 DIAGNOSIS — Z12.31 ENCOUNTER FOR SCREENING MAMMOGRAM FOR BREAST CANCER: Primary | ICD-10-CM

## 2020-02-18 ENCOUNTER — HOSPITAL ENCOUNTER (OUTPATIENT)
Dept: MAMMOGRAPHY | Facility: CLINIC | Age: 80
Discharge: HOME | End: 2020-02-18
Attending: INTERNAL MEDICINE | Admitting: INTERNAL MEDICINE
Payer: MEDICARE

## 2020-02-18 DIAGNOSIS — Z12.31 ENCOUNTER FOR SCREENING MAMMOGRAM FOR BREAST CANCER: ICD-10-CM

## 2020-02-18 PROCEDURE — 77067 SCR MAMMO BI INCL CAD: CPT

## 2020-08-03 ENCOUNTER — APPOINTMENT (OUTPATIENT)
Dept: GENERAL RADIOLOGY | Facility: CLINIC | Age: 80
End: 2020-08-03
Attending: EMERGENCY MEDICINE
Payer: MEDICARE

## 2020-08-03 ENCOUNTER — HOSPITAL ENCOUNTER (EMERGENCY)
Facility: CLINIC | Age: 80
Discharge: HOME | End: 2020-08-03
Attending: EMERGENCY MEDICINE | Admitting: EMERGENCY MEDICINE
Payer: MEDICARE

## 2020-08-03 VITALS
TEMPERATURE: 98 F | BODY MASS INDEX: 19.51 KG/M2 | DIASTOLIC BLOOD PRESSURE: 59 MMHG | WEIGHT: 106 LBS | HEIGHT: 62 IN | SYSTOLIC BLOOD PRESSURE: 144 MMHG | HEART RATE: 78 BPM | RESPIRATION RATE: 20 BRPM | OXYGEN SATURATION: 97 %

## 2020-08-03 DIAGNOSIS — R05.9 COUGH: ICD-10-CM

## 2020-08-03 DIAGNOSIS — Z20.822 SUSPECTED COVID-19 VIRUS INFECTION: ICD-10-CM

## 2020-08-03 LAB
ANION GAP SERPL CALCULATED.3IONS-SCNC: 1 MMOL/L (ref 3–14)
BASOPHILS # BLD AUTO: 0 10E9/L (ref 0–0.2)
BASOPHILS NFR BLD AUTO: 0.6 %
BUN SERPL-MCNC: 23 MG/DL (ref 7–30)
CALCIUM SERPL-MCNC: 9.3 MG/DL (ref 8.5–10.1)
CHLORIDE SERPL-SCNC: 100 MMOL/L (ref 94–109)
CO2 SERPL-SCNC: 34 MMOL/L (ref 20–32)
CREAT SERPL-MCNC: 0.71 MG/DL (ref 0.52–1.04)
DIFFERENTIAL METHOD BLD: NORMAL
EOSINOPHIL # BLD AUTO: 0.1 10E9/L (ref 0–0.7)
EOSINOPHIL NFR BLD AUTO: 1.4 %
ERYTHROCYTE [DISTWIDTH] IN BLOOD BY AUTOMATED COUNT: 12.9 % (ref 10–15)
GFR SERPL CREATININE-BSD FRML MDRD: 79 ML/MIN/{1.73_M2}
GLUCOSE SERPL-MCNC: 109 MG/DL (ref 70–99)
HCT VFR BLD AUTO: 40.6 % (ref 35–47)
HGB BLD-MCNC: 13.4 G/DL (ref 11.7–15.7)
IMM GRANULOCYTES # BLD: 0 10E9/L (ref 0–0.4)
IMM GRANULOCYTES NFR BLD: 0.2 %
INTERPRETATION ECG - MUSE: NORMAL
LYMPHOCYTES # BLD AUTO: 2 10E9/L (ref 0.8–5.3)
LYMPHOCYTES NFR BLD AUTO: 40.1 %
MCH RBC QN AUTO: 31.7 PG (ref 26.5–33)
MCHC RBC AUTO-ENTMCNC: 33 G/DL (ref 31.5–36.5)
MCV RBC AUTO: 96 FL (ref 78–100)
MONOCYTES # BLD AUTO: 0.5 10E9/L (ref 0–1.3)
MONOCYTES NFR BLD AUTO: 9.2 %
NEUTROPHILS # BLD AUTO: 2.5 10E9/L (ref 1.6–8.3)
NEUTROPHILS NFR BLD AUTO: 48.5 %
NRBC # BLD AUTO: 0 10*3/UL
NRBC BLD AUTO-RTO: 0 /100
PLATELET # BLD AUTO: 184 10E9/L (ref 150–450)
POTASSIUM SERPL-SCNC: 3.7 MMOL/L (ref 3.4–5.3)
RBC # BLD AUTO: 4.23 10E12/L (ref 3.8–5.2)
SODIUM SERPL-SCNC: 135 MMOL/L (ref 133–144)
TROPONIN I SERPL-MCNC: <0.015 UG/L (ref 0–0.04)
WBC # BLD AUTO: 5.1 10E9/L (ref 4–11)

## 2020-08-03 PROCEDURE — U0003 INFECTIOUS AGENT DETECTION BY NUCLEIC ACID (DNA OR RNA); SEVERE ACUTE RESPIRATORY SYNDROME CORONAVIRUS 2 (SARS-COV-2) (CORONAVIRUS DISEASE [COVID-19]), AMPLIFIED PROBE TECHNIQUE, MAKING USE OF HIGH THROUGHPUT TECHNOLOGIES AS DESCRIBED BY CMS-2020-01-R: HCPCS | Performed by: EMERGENCY MEDICINE

## 2020-08-03 PROCEDURE — 85025 COMPLETE CBC W/AUTO DIFF WBC: CPT | Performed by: EMERGENCY MEDICINE

## 2020-08-03 PROCEDURE — 80048 BASIC METABOLIC PNL TOTAL CA: CPT | Performed by: EMERGENCY MEDICINE

## 2020-08-03 PROCEDURE — 99285 EMERGENCY DEPT VISIT HI MDM: CPT | Mod: 25

## 2020-08-03 PROCEDURE — 84484 ASSAY OF TROPONIN QUANT: CPT | Performed by: EMERGENCY MEDICINE

## 2020-08-03 PROCEDURE — 93005 ELECTROCARDIOGRAM TRACING: CPT

## 2020-08-03 PROCEDURE — 71045 X-RAY EXAM CHEST 1 VIEW: CPT

## 2020-08-03 ASSESSMENT — MIFFLIN-ST. JEOR: SCORE: 894.06

## 2020-08-03 NOTE — ED TRIAGE NOTES
C/o cough, chest congestion, HA X 3 days. Thinks its related to her allergies but states  wanted her to get checked out and pt would like to be checked out before she goes to a family birthday

## 2020-08-03 NOTE — ED AVS SNAPSHOT
Emergency Department  [address] [facility] [address]  Phone:  [phone]  Fax:  [phone]                                    Carmen Zhang   MRN: [id number]    Department:   Emergency Department   Date of Visit:  8/3/2020           After Visit Summary Signature Page    I have received my discharge instructions, and my questions have been answered. I have discussed any challenges I see with this plan with the nurse or doctor.    ..........................................................................................................................................  Patient/Patient Representative Signature      ..........................................................................................................................................  Patient Representative Print Name and Relationship to Patient    ..................................................               ................................................  Date                                   Time    ..........................................................................................................................................  Reviewed by Signature/Title    ...................................................              ..............................................  Date                                               Time          22EPIC Rev 08/18

## 2020-08-04 LAB
SARS-COV-2 RNA SPEC QL NAA+PROBE: NOT DETECTED
SPECIMEN SOURCE: NORMAL

## 2020-08-04 NOTE — DISCHARGE INSTRUCTIONS
Discharge Instructions  COVID-19    It is essential that you isolate yourself until your symptoms have completely resolved for 3 days without any medications, and at least 10 days have passed since symptom onset, even if your COVID-19 test is negative, as it is possible to have a falsely negative COVID-19 test.    Your Provider has determined that you should practice self-isolation and self-monitoring in order to protect yourself and your community from COVID-19, which is the disease caused by a new coronavirus. The virus spreads from person to person primarily by droplets when an infected person coughs or sneezes and the droplet either lands on another person or that other person touches a surface with the droplet on it. Diagnosis of COVID-19 can be made with a test but many times the test is unavailable or not necessary. There is no specific treatment or medicine for the disease.    Symptoms of COVID-19    Many people have no symptoms or mild symptoms.  Symptoms may usually appear 4 to 5 days (up to 14 days) after contact with another ill person. Some people will get severe symptoms and pneumonia. Usual symptoms are:     ? Fever  ? Cough  ? Trouble breathing  ? Less common symptoms are: Headache, body aches, sore throat, sneezing, diarrhea, loss of taste or smell.    How to Care for Yourself Stay home.      Most people will recover from illness with mild symptoms.  Isolation by staying home is the best method to prevent the spread of the illness. Do not go to work or school. Have a friend or relative do your shopping. Do not use public transportation (bus, train) or ridesharing (Lyft, Uber).    How long should I stay home?    If you have symptoms of COVID, you should stay home for at least 10 days, and for 24 hours with no fever and improvement of symptoms--whichever is longer. (Your fever should be gone for 24 hours without using fever-reducing medicine)  For example, if you have a fever and cough for 6 days, you  need to stay home 4 more days with no fever for a total of 10 days. Or, if you have a fever and cough for 10 days, you need to stay home one more day with no fever for a total of 11 days.    Separate yourself from other people in your home.?As much as possible, you should stay in one room and away from other people in your home. Also, use a separate bathroom, if possible. Avoid handling pets or other animals while sick.     Wear a facemask if you need to be around other people and cover your mouth and nose with a tissue when you cough or sneeze.     Avoid sharing personal household items. You should not share dishes, drinking glasses, forks/knives/spoons, towels, or bedding with other people in your home. After using these items, they should be washed with soap and water. Clean parts of your home that are touched often (doorknobs, faucets, countertops, etc.) daily.     Wash your hands often with soap and water for at least 20 seconds or use an alcohol-based hand  containing at least 60% alcohol.     Avoid touching your face.    Treat your symptoms. You can take Acetaminophen (Tylenol) to treat body aches and fever as needed for comfort. Ibuprofen (Advil or Motrin) can be used as well if you still have symptoms after taking Tylenol. Drink fluids. Rest.    Watch for worsening symptoms such as shortness of breath/difficulty breathing or very severe weakness.    Employers/workplaces are being asked by the Centers for Disease Control (CDC) to not request notes/documentation for you to return to work or prove that you were ill. You may choose to show your employer this paperwork. Also, repeat testing should not be required to return to work.    Return to the Emergency Department if:    If you are developing worsening breathing, shortness of breath, or feel worse you should seek medical attention.  If you are uncertain, contact your health care provider/clinic. If you need emergency medical attention, call 911 and  tell them you have been ill.

## 2020-08-04 NOTE — ED PROVIDER NOTES
"History     Chief Complaint:  Cough and Chest Pain       HPI    Carmen Zhang is a 80 year old female with a history of hypertension but no chronic lung disease who presents at the encouragement of her  for evaluation of 3 days of a dry cough, chest pain when she coughs, and a slight headache.  She thinks it might be related to her allergies.  She would like to be checked out and is concerned for the possibility of COVID-19, as she would like to go to a family birthday celebration scheduled for August 9.  She denies any chest pain when not coughing.  No leg swelling or history of DVT or PE.  She is not taking any medications for the symptoms.  She expresses frustration at the long wait in the waiting room on this busy Monday evening.    Allergies:  Dye [Contrast Dye]  Iodine  Seasonal Allergies      Medications:   Asprin  81 mg   Lipitor   Valium   Synthroid   Lutein   Miralax   Seroquel   Desyrel     Medical History:   Female stress incontinence   Herpes simplex   Lumbago   Meniere's disease  Insomnia   Vitamin D deficiency   Hyp;   Hypertension     Surgical History   Breast biopsy   Appendectomy   Removal of growth on heart   Bladder suspension   Vein stripping   Hysterectomy    Family History:   Mother: Cancer, CAD  Father:  CAD, Prostate cancer, Parkinson's     Social History:  Patient was not accompanied to the ED.   Smoking Status: Former Smoker    Type: Cigarettes    Quit 1961  Smokeless Tobacco: Never Used   Alcohol Use: Negative   Drug Use: Negative   Primary Care: [facility]       Review of Systems  10 systems reviewed and negative except as above and in HPI.    Physical Exam     Patient Vitals for the past 24 hrs:   BP Temp Temp src Pulse Resp SpO2 Height Weight   08/03/20 1705 (!) 144/59 98  F (36.7  C) Oral 78 20 97 % 1.575 m (5' 2\") 48.1 kg (106 lb)      Physical Exam  General: Nontoxic appearing woman sitting upright in room 2, appears younger than age  HENT: mucous membranes " moist  CV: rate as above, regular rhythm, no lower extremity edema  Resp: Unlabored, no cough observed, speaks in full phrases, no stridor, no wheezing  GI: abdomen soft and nontender, no guarding  MSK: no bony tenderness  Skin: appropriately warm and dry  Neuro: alert, clear speech, oriented  Psych: cooperative      Emergency Department Course     ECG:  ECG taken at 1708  Normal sinus rhythm   Normal ECG  Rate 70 bpm. MN interval 150 ms. QRS duration 68 ms. QT/QTc 368/397 ms. P-R-T axes 88 74 72.    Imaging:  Radiology results were communicated with the patient who voiced understanding of the findings.    XR Chest Port 1 View  Single portable AP view of the chest was obtained.  Cardiomediastinal silhouette is within normal limits. No suspicious  focal pulmonary opacities. No significant pleural effusion or  pneumothorax.    Reading per radiology     Laboratory:  Laboratory findings were communicated with the patient who voiced understanding of the findings.    COVID-19 Virus (Coronavirus), PCR NP Swab: pending       Troponin: (Collected 1722) <0.015     CBC: WBC 5.1, HGB 13.4,   BMP: CO2 34 (H) Anion gap 1 (L) Glucose 109 (H)  (Creatinine 0.71) o/w WNL     Emergency Department Course:    1722 IV was inserted and blood was drawn for laboratory testing, results above.    1858 Nursing notes and vitals reviewed.    I performed electronic chart review in EPIC.    1900 I performed an exam of the patient as documented above.     1938 The patient was sent for XR while in the emergency department, results above.      The patient's nose was swabbed and this sample was sent for COVID testing, findings above.      1945 Findings and plan explained to the Patient. Patient discharged home with instructions regarding supportive care, medications, and reasons to return. The importance of close follow-up was reviewed.     Impression & Plan     Medical Decision Making:  Symptoms are potentially consistent with COVID-19, though  I also considered bacterial pneumonia, pulmonary edema, pulmonary embolism, cardiac conditions, and many others.  On this busy Monday afternoon, she had already had blood tests and an EKG done before I even evaluated her.  I do not suspect a cardiac process or feel that further work-up was indicated, with a COVID-19 swab and chest x-ray being the test I felt were most relevant to her presenting symptoms.  She is not in respiratory distress.  She expresses a strong preference for prompt discharge which was arranged.  She is advised to quarantine herself until at least 3 days after symptoms have resolved without treatment or 10 days from symptom onset, even if COVID-19 swab is negative given possibility of falsely negative COVID-19 swab.  She was discharged home.    Covid-19  Carmen Zhang was evaluated during a global COVID-19 pandemic, which necessitated consideration that the patient might be at risk for infection with the SARS-CoV-2 virus that causes COVID-19.   Applicable protocols for evaluation were followed during the patient's care.   COVID-19 was considered as part of the patient's evaluation. The plan for testing is:  a test was obtained during this visit.    Diagnosis:     ICD-10-CM    1. Cough  R05    2. Suspected COVID-19 virus infection  Z20.828         Disposition:  Discharged to home.    This note was completed in part using Dragon voice recognition software. Although reviewed after completion, some word and grammatical errors may occur.     Scribe Disclosure:  I, Aaron Montgomery, am serving as a scribe at 7:01 PM on 8/3/2020 to document services personally performed by Alex Velazquez MD based on my observations and the provider's statements to me.          Alex Velazquez MD  08/04/20 0056

## 2020-12-04 DIAGNOSIS — H35.89 RETINAL PIGMENT EPITHELIAL MOTTLING OF MACULA: Primary | ICD-10-CM

## 2021-01-01 NOTE — TELEPHONE ENCOUNTER
Reason for Call:  Same Day Appointment, Requested Provider:  Dr Jenkins    PCP: [facility]    Reason for visit: Tail bone injury from a fall    Duration of symptoms: 10 days ago    Have you been treated for this in the past? No    Additional comments: Pt only wants to see Dr Jenkins    Can we leave a detailed message on this number? YES    Phone number patient can be reached at: Home number on file [phone] (home)    Best Time: anytime    Call taken on 9/28/2017 at 8:12 AM by VALERIE SHARMA   football hold

## 2021-04-28 ENCOUNTER — NURSE TRIAGE (OUTPATIENT)
Dept: NURSING | Facility: CLINIC | Age: 81
End: 2021-04-28

## 2021-04-29 NOTE — TELEPHONE ENCOUNTER
Patient's  calling - verbal consent given by patient.  He says she was exposed to a group of people with COVID19 about a week ago.  She has had some fatigue the last couple days but no other symptoms.  She is interested in getting tested for COVID19.  She has only had one dose of COVID19 vaccine so far.    No difficulty breathing.  No chest pain.    Advised of options for getting order for COVID19 testing including e-visit with provider through [facility], telephone visit with provider or [facility] free testing sites.  He says he will look for appointments through [facility] website for both of them.  Advised to call back if more symptoms develop.    Rachele Toro RN  Triage Nurse Advisor

## 2021-05-15 ENCOUNTER — HOSPITAL ENCOUNTER (EMERGENCY)
Facility: CLINIC | Age: 81
Discharge: HOME | End: 2021-05-15
Attending: EMERGENCY MEDICINE | Admitting: EMERGENCY MEDICINE
Payer: MEDICARE

## 2021-05-15 VITALS
HEIGHT: 62 IN | HEART RATE: 78 BPM | WEIGHT: 103 LBS | SYSTOLIC BLOOD PRESSURE: 153 MMHG | RESPIRATION RATE: 16 BRPM | OXYGEN SATURATION: 96 % | TEMPERATURE: 98 F | BODY MASS INDEX: 18.95 KG/M2 | DIASTOLIC BLOOD PRESSURE: 67 MMHG

## 2021-05-15 DIAGNOSIS — L03.011 PARONYCHIA OF THUMB, RIGHT: ICD-10-CM

## 2021-05-15 PROCEDURE — 99283 EMERGENCY DEPT VISIT LOW MDM: CPT | Mod: 25

## 2021-05-15 PROCEDURE — 10060 I&D ABSCESS SIMPLE/SINGLE: CPT

## 2021-05-15 ASSESSMENT — MIFFLIN-ST. JEOR: SCORE: 880.45

## 2021-05-15 ASSESSMENT — ENCOUNTER SYMPTOMS: WOUND: 1

## 2021-05-16 NOTE — ED PROVIDER NOTES
"  History   Chief Complaint:  Wound Check       The history is provided by the patient.      Carmen Zhang is a 80 year old female who presents with wound check. Patient states right thumbnail infection that started about 2-3 days ago. She notes increased pain with no drainage. She states she has never had this before. Patient has taken ibuprofen.     Review of Systems   Skin: Positive for wound.   All other systems reviewed and are negative.    Allergies:  Dye [Contrast Dye]  Iodine    Medications:  Trazodone  Hydrochlorothiazide  Diazepam  Lipitor  Desyrel    Past Medical History:    Hypercholesteremia  Meniere's disease  Tendonitis  Herpes simplex  Syncope  Hypertension   Osteoporosis    Past Surgical History:    Bladder repair  Breast lumpectomy  Ear surgery   Appendectomy   Hysterectomy    Family History:    Mother: cancer, CAD  Father: CAD, prostate cancer, Parkinson's disease  Brother: CAD, Alzheimer's disease    Social History:  Patient presents to the ED with .     Physical Exam     Patient Vitals for the past 24 hrs:   BP Temp Temp src Pulse Resp SpO2 Height Weight   05/15/21 1855 (!) 153/67 98  F (36.7  C) Oral 78 16 96 % 1.575 m (5' 2\") 46.7 kg (103 lb)       Physical Exam  Physical Exam   General:  Sitting on bed with , Edilberto, at bedside, comfortable appearing.   HENT:  No obvious trauma to head  Right Ear:  External ear normal.   Left Ear:  External ear normal.   Nose:  Nose normal.   Eyes:  Conjunctivae and EOM are normal.  Neck: Normal range of motion. Neck supple. No tracheal deviation present.   Pulm/Chest: No respiratory distress  M/S: Normal range of motion.   Neuro: Alert. GCS 15.  Skin: Skin is warm and dry. No rash noted. Not diaphoretic. Right thumb ulnar aspect there is erythema infection and tenderness to the cuticle.  Psych: Normal mood and affect. Behavior is normal.     Emergency Department Course     Procedures    Incision and Drainage     LOCATIONS:  Right " thumb     ANESTHESIA: Patient agreed against digital block     PREPARATION:  Cleansed with alcohol prep     PROCEDURE:  Area was incised with # 11 Blade ([place]) with a Single Straight incision.  Wound treatment included Expression of purulent material, but mainly some blood.   No packing.  Appropriate dressing with bacitracin was applied to cover the area.    PATIENT STATUS:        Patient tolerated the procedure well. There were no complications.        Emergency Department Course:    Reviewed:  I reviewed nursing notes, vitals, past medical history and care everywhere    Assessments:  1928 I obtained history and examined the patient as noted above.    I performed incision and drainage procedure, see above.    Disposition:  The patient was discharged to home.       Impression & Plan   Medical Decision Making:  Carmen Zhang is a very pleasant 80 year old female who presents for evaluation of a painful right thumb.  This is consistent with a paronychia.  Patient consented for incision and drainage without digital block.  Incision and drainage yielded scant pus and mainly some blood, the nail bed was also decompressed.  I recommended warm compresses.  No signs of lymphangitis, marked cellulitis, gangrene, or other worrisome soft tissue/bony issue at this time.  Plan to f/u with podiatry or primary for wound recheck. Warm soaks 3 x daily for 3-5 days Bacitracin and wound care discussed BID.  Stable for discharge.      The treatment plan was discussed with the patient and they expressed understanding of this plan and consented to the plan.  In addition, the patient will return to the emergency department if their symptoms persist, worsen, if new symptoms arise or if there is any concern as other pathology may be present that is not evident at this time. They also understand the importance of close follow up in the clinic and if unable to do so will return to the emergency department for a reevaluation. All  questions were answered.    Diagnosis:    ICD-10-CM    1. Paronychia of thumb, right  L03.011        Discharge Medications:  New Prescriptions    No medications on file       Scribe Disclosure:  I, Macrina Mosqueda, am serving as a scribe at 7:09 PM on 5/15/2021 to document services personally performed by Levi Garsia DO based on my observations and the provider's statements to me.        Levi Garsia DO  05/15/21 [id number]

## 2021-06-27 ENCOUNTER — HOSPITAL ENCOUNTER (OUTPATIENT)
Facility: CLINIC | Age: 81
Setting detail: OBSERVATION
Discharge: HOME | End: 2021-06-28
Attending: EMERGENCY MEDICINE | Admitting: INTERNAL MEDICINE
Payer: MEDICARE

## 2021-06-27 DIAGNOSIS — R07.9 CHEST PAIN, UNSPECIFIED TYPE: ICD-10-CM

## 2021-06-27 LAB
ALBUMIN SERPL-MCNC: 3.4 G/DL (ref 3.4–5)
ALP SERPL-CCNC: 48 U/L (ref 40–150)
ALT SERPL W P-5'-P-CCNC: 38 U/L (ref 0–50)
ANION GAP SERPL CALCULATED.3IONS-SCNC: 3 MMOL/L (ref 3–14)
AST SERPL W P-5'-P-CCNC: 30 U/L (ref 0–45)
BASOPHILS # BLD AUTO: 0 10E9/L (ref 0–0.2)
BASOPHILS NFR BLD AUTO: 0.7 %
BILIRUB SERPL-MCNC: 0.3 MG/DL (ref 0.2–1.3)
BUN SERPL-MCNC: 29 MG/DL (ref 7–30)
CALCIUM SERPL-MCNC: 9.7 MG/DL (ref 8.5–10.1)
CHLORIDE SERPL-SCNC: 104 MMOL/L (ref 94–109)
CO2 SERPL-SCNC: 32 MMOL/L (ref 20–32)
CREAT SERPL-MCNC: 0.66 MG/DL (ref 0.52–1.04)
DIFFERENTIAL METHOD BLD: NORMAL
EOSINOPHIL # BLD AUTO: 0.1 10E9/L (ref 0–0.7)
EOSINOPHIL NFR BLD AUTO: 2.3 %
ERYTHROCYTE [DISTWIDTH] IN BLOOD BY AUTOMATED COUNT: 13.4 % (ref 10–15)
GFR SERPL CREATININE-BSD FRML MDRD: 81 ML/MIN/{1.73_M2}
GLUCOSE SERPL-MCNC: 88 MG/DL (ref 70–99)
HCT VFR BLD AUTO: 39.9 % (ref 35–47)
HGB BLD-MCNC: 13 G/DL (ref 11.7–15.7)
IMM GRANULOCYTES # BLD: 0 10E9/L (ref 0–0.4)
IMM GRANULOCYTES NFR BLD: 0.3 %
INTERPRETATION ECG - MUSE: NORMAL
LYMPHOCYTES # BLD AUTO: 1.9 10E9/L (ref 0.8–5.3)
LYMPHOCYTES NFR BLD AUTO: 31.7 %
MCH RBC QN AUTO: 31 PG (ref 26.5–33)
MCHC RBC AUTO-ENTMCNC: 32.6 G/DL (ref 31.5–36.5)
MCV RBC AUTO: 95 FL (ref 78–100)
MONOCYTES # BLD AUTO: 0.7 10E9/L (ref 0–1.3)
MONOCYTES NFR BLD AUTO: 11.9 %
NEUTROPHILS # BLD AUTO: 3.3 10E9/L (ref 1.6–8.3)
NEUTROPHILS NFR BLD AUTO: 53.1 %
NRBC # BLD AUTO: 0 10*3/UL
NRBC BLD AUTO-RTO: 0 /100
PLATELET # BLD AUTO: 218 10E9/L (ref 150–450)
POTASSIUM SERPL-SCNC: 3.8 MMOL/L (ref 3.4–5.3)
PROT SERPL-MCNC: 7.1 G/DL (ref 6.8–8.8)
RBC # BLD AUTO: 4.2 10E12/L (ref 3.8–5.2)
SODIUM SERPL-SCNC: 139 MMOL/L (ref 133–144)
TROPONIN I SERPL-MCNC: <0.015 UG/L (ref 0–0.04)
WBC # BLD AUTO: 6.1 10E9/L (ref 4–11)

## 2021-06-27 PROCEDURE — 84484 ASSAY OF TROPONIN QUANT: CPT | Performed by: EMERGENCY MEDICINE

## 2021-06-27 PROCEDURE — 80053 COMPREHEN METABOLIC PANEL: CPT | Performed by: EMERGENCY MEDICINE

## 2021-06-27 PROCEDURE — 99285 EMERGENCY DEPT VISIT HI MDM: CPT | Mod: 25

## 2021-06-27 PROCEDURE — 93005 ELECTROCARDIOGRAM TRACING: CPT

## 2021-06-27 PROCEDURE — 85025 COMPLETE CBC W/AUTO DIFF WBC: CPT | Performed by: EMERGENCY MEDICINE

## 2021-06-27 ASSESSMENT — ENCOUNTER SYMPTOMS
VOMITING: 0
NAUSEA: 0
DIARRHEA: 0
NECK PAIN: 1

## 2021-06-27 ASSESSMENT — MIFFLIN-ST. JEOR: SCORE: 875.45

## 2021-06-28 ENCOUNTER — APPOINTMENT (OUTPATIENT)
Dept: GENERAL RADIOLOGY | Facility: CLINIC | Age: 81
End: 2021-06-28
Attending: INTERNAL MEDICINE
Payer: MEDICARE

## 2021-06-28 ENCOUNTER — APPOINTMENT (OUTPATIENT)
Dept: CARDIOLOGY | Facility: CLINIC | Age: 81
End: 2021-06-28
Attending: INTERNAL MEDICINE
Payer: MEDICARE

## 2021-06-28 VITALS
TEMPERATURE: 97.5 F | HEIGHT: 62 IN | RESPIRATION RATE: 18 BRPM | BODY MASS INDEX: 18.95 KG/M2 | DIASTOLIC BLOOD PRESSURE: 54 MMHG | OXYGEN SATURATION: 96 % | SYSTOLIC BLOOD PRESSURE: 129 MMHG | HEART RATE: 76 BPM | WEIGHT: 103 LBS

## 2021-06-28 PROBLEM — R07.9 CHEST PAIN, UNSPECIFIED TYPE: Status: ACTIVE | Noted: 2021-06-28

## 2021-06-28 LAB
LABORATORY COMMENT REPORT: NORMAL
SARS-COV-2 RNA RESP QL NAA+PROBE: NEGATIVE
SPECIMEN SOURCE: NORMAL
TROPONIN I SERPL-MCNC: <0.015 UG/L (ref 0–0.04)
TROPONIN I SERPL-MCNC: <0.015 UG/L (ref 0–0.04)

## 2021-06-28 PROCEDURE — 99207 PR NO CHARGE LOS: CPT | Performed by: HOSPITALIST

## 2021-06-28 PROCEDURE — 250N000013 HC RX MED GY IP 250 OP 250 PS 637: Performed by: INTERNAL MEDICINE

## 2021-06-28 PROCEDURE — 93321 DOPPLER ECHO F-UP/LMTD STD: CPT | Mod: TC

## 2021-06-28 PROCEDURE — 99207 PR CDG-CODE CATEGORY CHANGED: CPT | Performed by: INTERNAL MEDICINE

## 2021-06-28 PROCEDURE — C9803 HOPD COVID-19 SPEC COLLECT: HCPCS

## 2021-06-28 PROCEDURE — 99236 HOSP IP/OBS SAME DATE HI 85: CPT | Performed by: INTERNAL MEDICINE

## 2021-06-28 PROCEDURE — 250N000013 HC RX MED GY IP 250 OP 250 PS 637: Mod: GY | Performed by: EMERGENCY MEDICINE

## 2021-06-28 PROCEDURE — 93325 DOPPLER ECHO COLOR FLOW MAPG: CPT | Mod: 26 | Performed by: INTERNAL MEDICINE

## 2021-06-28 PROCEDURE — 93321 DOPPLER ECHO F-UP/LMTD STD: CPT | Mod: 26 | Performed by: INTERNAL MEDICINE

## 2021-06-28 PROCEDURE — 71046 X-RAY EXAM CHEST 2 VIEWS: CPT

## 2021-06-28 PROCEDURE — 36415 COLL VENOUS BLD VENIPUNCTURE: CPT | Performed by: INTERNAL MEDICINE

## 2021-06-28 PROCEDURE — 93016 CV STRESS TEST SUPVJ ONLY: CPT | Performed by: INTERNAL MEDICINE

## 2021-06-28 PROCEDURE — 87635 SARS-COV-2 COVID-19 AMP PRB: CPT | Performed by: EMERGENCY MEDICINE

## 2021-06-28 PROCEDURE — G0378 HOSPITAL OBSERVATION PER HR: HCPCS

## 2021-06-28 PROCEDURE — 93350 STRESS TTE ONLY: CPT | Mod: TC

## 2021-06-28 PROCEDURE — 93018 CV STRESS TEST I&R ONLY: CPT | Performed by: INTERNAL MEDICINE

## 2021-06-28 PROCEDURE — 84484 ASSAY OF TROPONIN QUANT: CPT | Performed by: INTERNAL MEDICINE

## 2021-06-28 PROCEDURE — 93350 STRESS TTE ONLY: CPT | Mod: 26 | Performed by: INTERNAL MEDICINE

## 2021-06-28 RX ORDER — ASPIRIN 325 MG
325 TABLET ORAL ONCE
Status: COMPLETED | OUTPATIENT
Start: 2021-06-28 | End: 2021-06-28

## 2021-06-28 RX ORDER — ATORVASTATIN CALCIUM 10 MG/1
10 TABLET, FILM COATED ORAL ONCE
Status: COMPLETED | OUTPATIENT
Start: 2021-06-28 | End: 2021-06-28

## 2021-06-28 RX ORDER — ZINC GLUCONATE 50 MG
50 TABLET ORAL DAILY
COMMUNITY

## 2021-06-28 RX ORDER — HYDROCHLOROTHIAZIDE 25 MG/1
25 TABLET ORAL DAILY
COMMUNITY

## 2021-06-28 RX ORDER — THYROID 15 MG/1
15 TABLET ORAL DAILY
COMMUNITY
End: 2021-06-29

## 2021-06-28 RX ORDER — LIDOCAINE 40 MG/G
CREAM TOPICAL
Status: DISCONTINUED | OUTPATIENT
Start: 2021-06-28 | End: 2021-06-28 | Stop reason: HOSPADM

## 2021-06-28 RX ORDER — DIAZEPAM 2 MG
2 TABLET ORAL ONCE
Status: COMPLETED | OUTPATIENT
Start: 2021-06-28 | End: 2021-06-28

## 2021-06-28 RX ORDER — ASPIRIN 81 MG/1
81 TABLET ORAL DAILY
Status: DISCONTINUED | OUTPATIENT
Start: 2021-06-28 | End: 2021-06-28 | Stop reason: HOSPADM

## 2021-06-28 RX ORDER — MULTIPLE VITAMINS W/ MINERALS TAB 9MG-400MCG
1 TAB ORAL DAILY
COMMUNITY

## 2021-06-28 RX ORDER — MULTIVIT WITH MINERALS/LUTEIN
1000 TABLET ORAL DAILY
Status: ON HOLD | COMMUNITY
End: 2023-05-01

## 2021-06-28 RX ORDER — MAGNESIUM HYDROXIDE/ALUMINUM HYDROXICE/SIMETHICONE 120; 1200; 1200 MG/30ML; MG/30ML; MG/30ML
30 SUSPENSION ORAL EVERY 4 HOURS PRN
Status: DISCONTINUED | OUTPATIENT
Start: 2021-06-28 | End: 2021-06-28 | Stop reason: HOSPADM

## 2021-06-28 RX ORDER — AMOXICILLIN 500 MG
1200 CAPSULE ORAL DAILY
COMMUNITY

## 2021-06-28 RX ORDER — ACETAMINOPHEN 650 MG/1
650 SUPPOSITORY RECTAL EVERY 4 HOURS PRN
Status: DISCONTINUED | OUTPATIENT
Start: 2021-06-28 | End: 2021-06-28 | Stop reason: HOSPADM

## 2021-06-28 RX ORDER — NITROGLYCERIN 0.4 MG/1
0.4 TABLET SUBLINGUAL EVERY 5 MIN PRN
Status: DISCONTINUED | OUTPATIENT
Start: 2021-06-28 | End: 2021-06-28 | Stop reason: HOSPADM

## 2021-06-28 RX ORDER — ACETAMINOPHEN 325 MG/1
650 TABLET ORAL EVERY 4 HOURS PRN
Status: DISCONTINUED | OUTPATIENT
Start: 2021-06-28 | End: 2021-06-28 | Stop reason: HOSPADM

## 2021-06-28 RX ORDER — CARBOXYMETHYLCELLULOSE SODIUM 5 MG/ML
1 SOLUTION/ DROPS OPHTHALMIC 4 TIMES DAILY PRN
COMMUNITY

## 2021-06-28 RX ADMIN — ATORVASTATIN CALCIUM 10 MG: 10 TABLET, FILM COATED ORAL at 02:03

## 2021-06-28 RX ADMIN — ASPIRIN 81 MG: 81 TABLET, COATED ORAL at 09:28

## 2021-06-28 RX ADMIN — MELATONIN 5 MG TABLET 10 MG: at 02:04

## 2021-06-28 RX ADMIN — DIAZEPAM 2 MG: 2 TABLET ORAL at 02:03

## 2021-06-28 RX ADMIN — ASPIRIN 325 MG ORAL TABLET 325 MG: 325 PILL ORAL at 02:03

## 2021-06-28 NOTE — PLAN OF CARE
Patient discharged at 2:00 PM to Discharged to home  IV was discontinued. Pain at time of discharge was 0/10. Belongings returned to patient.  Discharge instructions and medications reviewed with patient.  Patient verbalized understanding and all questions were answered.  No new scripts or changes to meds.  At time of discharge, patient condition was stable and left the unit escorted by CNA.

## 2021-06-28 NOTE — ED TRIAGE NOTES
Episode of chest pain lasting 15-20 minutes this evening, pain resolved. No SOB, no diaphoresis, no weakness, no N/V

## 2021-06-28 NOTE — H&P
[facility]    History and Physical - Hospitalist Service       Date of Admission:  6/27/2021    Assessment & Plan      Carmen Zhang is a 81 year old female admitted on 6/27/2021. She presents to the emergency department after an episode of central chest discomfort radiating from her epigastrium.      Chest discomfort: Atypical in nature.  Patient is quite active, no prior issues with similar chest discomfort.  Occurred at rest, resolved prior to arrival.  EKG normal sinus.  Patient does have a history of cholelithiasis, though no previous similar symptoms.  Does have coronary artery calcifications on historical CT imaging  -Continue troponin trend to completion, last at 7 AM  -Cardiac telemetry  -Exercise stress echocardiogram  -Caffeine free diet  -Continue daily 81 mg aspirin  -Prior to admission atorvastatin on hold given observation admission, can resume on discharge  -Two-view chest x-ray pending    Question of cardiac gallop: PACs versus S3 appreciated on auscultation no respiratory difficulties.  No change in some venous varicosities of lower extremities with very mild swelling.  -Cardiac telemetry  -Stress echocardiogram as above.  If concern, can obtain dedicated echocardiogram.    Hypothyroidism:  -Resume prior to admission low-dose levothyroxine at discharge.  Held given observation admission    Insomnia:  -Holding prior to admission sleep medications, anticipate discharge prior to need for resumption as well as observation admission.       Diet:  Regular diet, no caffeine  DVT Prophylaxis: Ambulate every shift  Hoff Catheter: Not present  Central Lines: None  Code Status:  Full code.  Confirmed with patient admission    Risk Factors Present on Admission              # Platelet Defect: home medication list includes an antiplatelet medication      Disposition Plan   Expected discharge: Today, recommended to prior living arrangement once Cardiac evaluation complete.     The  patient's care was discussed with the Patient and Dr. Valenzuela in the emergency department.    Jacob Melgar MD  [facility]  Securely message with the [employer] Web Console (learn more here)  Text page via [employer] Paging/Directory      ______________________________________________________________________    Chief Complaint   Central chest discomfort    History is obtained from patient, chart review, discussion with ER provider    History of Present Illness   Carmen Zhang is a 81 year old female who presents to the emergency department after she had onset of central chest discomfort at approximately 8:30 PM while watching TV at rest.    Patient is an extremely active 81-year-old female.  She has no personal history of coronary artery disease, though tells me that the entire maternal side of her family tree has coronary artery disease, both mother and father had heart disease in their 70s.  She is on a low-dose statin for prevention as well as a daily 81 mg aspirin at baseline.  More historically she was quite active with cross-country skiing, long distance biking from [place] to [place] and back.  Currently, she uses a Nordic track for 30 minutes in the morning, 30 minutes at night every day, does not have exertional dyspnea or chest discomfort.  Is uncertain if she has ever had GERD or reflux; does not recall ever having similar discomfort.  Discomfort lasted approximately 20 minutes and has not recurred since.    With patient's discomfort, her  was concerned and encouraged her to present to the emergency department for cardiac evaluation given her family history of heart disease.  Mother and father with heart disease in their 70s, though some earlier coronary artery disease in grandparents and maternal uncles.  Initial troponin in the emergency department negative, chest pain completely resolved.  EKG with normal sinus rhythm.  Patient thought about discharging home as she feels  "that her  might have been overreacting, though was agreeable to observation admission for ongoing cardiac evaluation.    No associated nausea or diaphoresis, no radiation to shoulders, though she did have some discomfort in her back at level of shoulder blades.  This has resolved.    Patient with a thoracotomy scar on her right side.  She tells me that when she was in her 30s, she was hospitalized at [facility] for thoracotomy for presumed lung cancer after a physical exam with routine screening chest x-ray at that time demonstrated a mass which was thought to be lung cancer.  She underwent surgery for thoracotomy, though tells me that she did not have a lung mass, rather she had a mass that was adjacent to the heart in the pericardial sac that was \"the size of [her] fist.\"  This was resected.  Patient does not recall the exact diagnosis associated with this, though received no further treatments.  When asked specifically if this was protrusion of heart muscle, patient tells me it was something else, though cannot recall what type of tissue this mass was made of.     Does have a history of cholelithiasis on prior imaging.  Presentation could represent symptomatic cholelithiasis, though unclear currently as symptoms have resolved, no similar prior symptoms.  She tells me that she last ate at approximately 6:30 PM prior to onset of discomfort at 830.  Typically snacks throughout the day.  Dinner included green beans, chicken, broccoli.  Nothing particularly fatty.  Tells me that she is very healthy about her eating and avoids all saturated fats.    Review of Systems    The 10 point Review of Systems is negative other than noted in the HPI or here.  No fevers or chills  No shortness of breath  Has some chronic lower extremity swelling requiring vein stripping in the past, no changes to this    Past Medical History    I have reviewed this patient's medical history and updated it with pertinent information if " needed.   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  Past Surgical History:   Procedure Laterality Date     BREAST BIOPSY, RT/LT       C APPENDECTOMY       HYSTERECTOMY, VAGINAL      ovaries left     Z NONSPECIFIC PROCEDURE  33 yrs old    removal of growth on heart      Z NONSPECIFIC PROCEDURE      bladder suspension     [facility] NONSPECIFIC PROCEDURE      vein stripping       Social History   I have reviewed this patient's social history and updated it with pertinent information if needed.  Social History     Tobacco Use     Smoking status: Former Smoker     Packs/day: 3.00     Years: 13.00     Pack years: 39.00     Types: Cigarettes     Quit date: 1961     Years since quittin.5     Smokeless tobacco: Never Used     Tobacco comment: STARTED SMOKING AT AGE 8 - quit in    Substance Use Topics     Alcohol use: No     Alcohol/week: 0.0 standard drinks     Drug use: No       Family History   I have reviewed this patient's family history and updated it with pertinent information if needed.  Family History   Problem Relation Age of Onset     Cancer Mother         ESOPHAGEAL, SMOKER     CARLOACARRIE Mother         MI IN HER 70s     MINH Father         MI IN HIS 70s     Prostate Cancer Father      Neurologic Disorder Father         PARKINSON'S     Cerebrovascular Disease Maternal Grandmother      Blood Disease Maternal Grandfather      Diabetes Maternal Aunt      CARLOAAKSHAT. Brother         11 STENTS AND BYPASS, IN HIS FIFTIES     Obesity Brother      Connective Tissue  Disorder Daughter         HIP PRESENTATION     Diabetes Maternal Aunt      Connective Tissue Disorder Daughter         ARTHRITIS       Prior to Admission Medications   Prior to Admission Medications   Prescriptions Last Dose Informant Patient Reported? Taking?   ASPIRIN 81 MG OR TABS  Self Yes No   Sig: Take 81 mg by mouth daily    COQ10 100 MG OR CAPS  Self Yes No   Si teaspoonsful of Qunol-liquid daily.   FISH OIL 1000 MG OR CAPS  Self Yes No   Sig: Take 1 g by mouth daily    Lutein 20 MG TABS  Self Yes No   Sig: Take 1 tablet by mouth daily (with breakfast) .   MULTIVITAMIN TABS   OR  Self Yes No   Sig: Take 1 tablet by mouth daily    Melatonin 10 MG TABS  Self Yes No   Sig: Take 10 mg by mouth At Bedtime    QUEtiapine (SEROQUEL) 50 MG tablet   Yes No   Sig: Take 0.5 tablets (25 mg) by mouth At Bedtime TAKE 1 TO 2 TABLETS ( 50  MG) NIGHTLY AS NEEDED FOR SLEEP AID   UROCIT-K 10 MEQ (1080 MG) CR tablet  Self No No   Sig: TAKE 1 TABLET THREE TIMES A DAY WITH MEALS FOR POTASSIUM SUPPLEMENTATION   atorvastatin (LIPITOR) 10 MG tablet  Self No No   Sig: Take 1 tablet (10 mg) by mouth daily Pt is requesting that she only receive Mylan Brand.   calcium-vitamin D (CALCIUM 600 + D) 600-400 MG-UNIT per tablet  Self No No   Sig: Take 1 tablet by mouth 2 times daily FOR BONE HEALTH AND FOR VITAMIN D DEFICIENCY (LOW VITAMIN D)   carboxymethylcellulose PF (CARBOXYMETHYLCELLULOSE SODIUM) 0.5 % ophthalmic solution  Self No No   Sig: Place 1 drop into both eyes 4 times daily   cholecalciferol 2000 UNITS CAPS  Self No No   Sig: Take 1 capsule by mouth daily   cyabnocobalamin (VITAMIN B-12) 2500 MCG sublingual tablet  Self No No   Sig: Take 2,500 mcg by mouth daily INDICATION: FOR VITAMIN B12 SUPPLEMENTATION - TO IMPROVE MEMORY, BALANCE, SLEEP AND MOOD, DIRECTIONS: PLEASE PLACE UNDER THE TONGUE TO DISSOLVE   diazepam (VALIUM) 2 MG tablet  Self No No   Sig: Take 1 tablet (2 mg) by mouth At Bedtime   levothyroxine  (SYNTHROID/LEVOTHROID) 25 MCG tablet  Self Yes No   Sig: Take 25 mcg by mouth daily   polyethylene glycol (MIRALAX) powder  Self No No   Sig: Take 17 g (1 capful) by mouth daily INDICATION: CONSTIPATION, TO ACHIEVE 1-2 SOFT BMs PER DAY   traZODone (DESYREL) 50 MG tablet   No No   Sig: Take 1 tablet (50 mg) by mouth At Bedtime   vitamin E (RA VITAMIN E) 1000 UNIT capsule  Self No No   Sig: Take 1 capsule (1,000 Units) by mouth 2 times daily INDICATION: TO HELP IMPROVE MEMORY   Patient taking differently: Take 1,000 Units by mouth daily INDICATION: TO HELP IMPROVE MEMORY      Facility-Administered Medications: None     Allergies   Allergies   Allergen Reactions     Dye [Contrast Dye] Hives and Itching     Iodine Hives     Seasonal Allergies        Physical Exam   Vital Signs: Temp: 98.4  F (36.9  C) Temp src: Oral BP: 134/54 Pulse: 69   Resp: 18 SpO2: 97 % O2 Device: None (Room air)    Weight: 103 lbs 0 oz    General Appearance: Thin 81-year-old female in no acute distress resting comfortably in bed.  Eyes: No scleral icterus or injection  HEENT: Normocephalic, atraumatic  Respiratory: Breath sounds are clear bilateral to auscultation, no wheeze, no crackles, good effort, good air movement throughout lung fields.  Cardiovascular: Regular rate and rhythm.  Heart rate currently in the 70s.  No appreciable murmur, though question S3 gallop.  Lymph/Hematologic: Has some superficial venous varicosities of bilateral lower extremities without significant swelling or pitting edema.  Genitourinary: Not examined  Skin: Superficial venous varicosities of lower extremities.  Slightly erythematous lower extremities as compared to above potentially related to venous stasis changes.  No other rash appreciated.  Scar from prior right thoracotomy attempt lateral of right scapula onto right flank  Musculoskeletal: Thin, moderate muscular loss  Neurologic: Alert, conversant, appropriate conversation did not assess grossly  intact.  Psychiatric: Normal affect, very pleasant    Data   Data reviewed today: I reviewed all medications, new labs and imaging results over the last 24 hours. I personally reviewed the EKG tracing showing Normal sinus rhythm.    Recent Labs   Lab 06/28/21  0300 06/27/21  2243   WBC  --  6.1   HGB  --  13.0   MCV  --  95   PLT  --  218   NA  --  139   POTASSIUM  --  3.8   CHLORIDE  --  104   CO2  --  32   BUN  --  29   CR  --  0.66   ANIONGAP  --  3   FLAKO  --  9.7   GLC  --  88   ALBUMIN  --  3.4   PROTTOTAL  --  7.1   BILITOTAL  --  0.3   ALKPHOS  --  48   ALT  --  38   AST  --  30   TROPI <0.015 <0.015

## 2021-06-28 NOTE — PLAN OF CARE
A&Ox4, [place]. VSS on RA. Denies pain/nausea/SOB. PIV SL. SBA. Plan: CXR & echo stress test this AM w/ probable discharge after pending results.

## 2021-06-28 NOTE — DISCHARGE SUMMARY
[facility]  Hospitalist Discharge Summary      Date of Admission:  6/27/2021  Date of Discharge:  6/28/2021  Discharging Provider: Joseph Milner MD      Discharge Diagnoses   Atypical chest pain, possible GERD  Hypothyroidism  Insomnia    Follow-ups Needed After Discharge   Follow-up Appointments     Follow-up and recommended labs and tests       Follow up with primary care provider, Martinez Jenkins, as needed.           Discharge Disposition   Discharged to home  Condition at discharge: Stable    Hospital Course   Carmen Zhang is a 81 year old female admitted on 6/27/2021. She presents to the emergency department after an episode of central chest discomfort radiating from her epigastrium.       Atypical chest pain, possible GERD  Patient is quite active, no prior issues with similar chest discomfort.  Occurred at rest, resolved prior to arrival.  EKG normal sinus without ischemic changes.  Patient does have a history of cholelithiasis, though no previous similar symptoms.  CXR was clear.  Serial troponin were negative and exercise stress echo returned normal.  She had no recurrence of discomfort while hospitalized, though did describe as a burning sensation moving up into her chest.  Recommended trial of Tums and follow up with PCP if recurrent symptoms.      Hypothyroidism  Continue PTA levothyroxine.     Insomnia  Continue PTA meds.       Consultations This Hospital Stay   None    Code Status   Full Code    Time Spent on this Encounter   I, Joseph Milner MD, personally saw the patient today and spent less than or equal to 30 minutes discharging this patient.       Joseph Milner MD  [place] [id number] [facility]  [address] [address]  Phone: [phone]  ______________________________________________________________________    Physical Exam   Vital Signs: Temp: 97.5  F (36.4  C) Temp src: Oral BP: 129/54 Pulse: 76   Resp: 18 SpO2: 96 % O2  Device: None (Room air)    Weight: 103 lbs 0 oz  General Appearance: Thin female in NAD  Respiratory: lungs CTAB, no wheezes or crackles ,no tachypnea  Cardiovascular: RRR, normal s1/s2 without murmur  GI: abdomen soft, normal bowel sounds  Other: Alert and appropriate, cranial nerves grossly intact        Primary Care Physician   Martinez Jenkins    Discharge Orders      Reason for your hospital stay    You were admitted for chest pain, the cause of which remains unclear (highest concern for acid reflux) but you did not have a heart attack and your stress test returned normal.     Follow-up and recommended labs and tests     Follow up with primary care provider, Martinez Jenkins, as needed.     Activity    Your activity upon discharge: activity as tolerated     Full Code     Diet    Follow this diet upon discharge: Regular diet       Significant Results and Procedures   Most Recent 3 CBC's:  Recent Labs   Lab Test 06/27/21  2243 08/03/20  1722 01/04/20  0938   WBC 6.1 5.1 7.8   HGB 13.0 13.4 13.5   MCV 95 96 94    184 180     Most Recent 3 BMP's:  Recent Labs   Lab Test 06/27/21  2243 08/03/20  1722 01/05/20  0629    135 144   POTASSIUM 3.8 3.7 3.5   CHLORIDE 104 100 115*   CO2 32 34* 26   BUN 29 23 10   CR 0.66 0.71 0.47*   ANIONGAP 3 1* 3   FLAKO 9.7 9.3 8.0*   GLC 88 109* 101*     Most Recent 3 Troponin's:  Recent Labs   Lab Test 06/28/21  0659 06/28/21  0300 06/27/21  2243   TROPI <0.015 <0.015 <0.015   ,   Results for orders placed or performed during the hospital encounter of 06/27/21   XR Chest 2 Views    Narrative    XR CHEST 2 VW 6/28/2021 10:05 AM    HISTORY: chest discomfort    COMPARISON: 8/3/2020      Impression    IMPRESSION:Hyperinflated lungs. Linear scarring in the lateral right  lung base is stable. No focal infiltrate, pleural effusion or  pneumothorax. The cardiac and mediastinal silhouettes are normal.    SUKI FLOYD MD   Echo Stress Echocardiogram    Narrative     [id number]  formerly [facility]  [id number]  [id number]^WINTERS^KATYA^LEATHA     [facility]  Echocardiography Laboratory  [address] [place] [address]     Name: MAGGY UP  MRN: [id number]  : 1940  Study Date: 2021 08:21 AM  Age: 81 yrs  Gender: Female  Patient Location: [facility]  Reason For Study: Chest Discomfort  Ordering Physician: KATYA WINTERS  Referring Physician: JAIDEN AUSTIN  Performed By: Louis Jay RDCS     BSA: 1.4 m2  Height: 62 in  Weight: 103 lb  HR: 82  BP: 106/56 mmHg  ______________________________________________________________________________  Procedure  Stress Echo Complete.  ______________________________________________________________________________  Interpretation Summary  1. The patient exercised 7:30 min. Above average functional capacity for age.  2. The patient exhibited no chest pain during exercise.  3. This was a normal stress EKG with no evidence of stress-induced ischemia.  4. Normal left ventricular function and wall motion at rest and post-stress.  ______________________________________________________________________________  Stress  The patient exercised 7:30 min.  Exercise was stopped due to fatigue.  There was a normal BP response to exercise.  The patient exhibited no chest pain during exercise.  A treadmill exercise test according to the Murphy protocol was performed.  Target Heart Rate was achieved.  This was a normal stress EKG with no evidence of stress-induced ischemia.  Normal left ventricular function and wall motion at rest and post-stress.     Baseline  Resting ECG is normal.  Normal left ventricular function and wall motion at rest.     Stress Results         Protocol:  Murphy Protocol        Maximum Predicted HR:   137 bpm         Target HR: 116 bpm               % Maximum Predicted HR: 113 %              Duration  Heart    Stage   (mm:ss)   Rate     BP                    Comment                     (bpm)   Stage 1   3:00      118    142/66   Stage 2   3:00     139    150/64   Stage 3   1:30     155    156/60RPP: 24,180; FAC: Above Average; Velazquez Score:                                   7  RECOVERYR  6:00      98    128/56             Stress Duration:   7:30 mm:ss        Recovery Time: 6:00 mm:ss          Maximum Stress HR: 155 bpm           METS:          10     Mitral Valve  The mitral valve is normal in structure and function. There is mild (1+)  mitral regurgitation.     Tricuspid Valve  The tricuspid valve is normal in structure and function.     Aortic Valve  The aortic valve is normal in structure and function.     Pulmonic Valve  The pulmonic valve is normal in structure and function.     Pericardium  There is no pericardial effusion.     ______________________________________________________________________________  MMode/2D Measurements & Calculations  Ao root diam: 2.9 cm  LVOT diam: 2.0 cm  LVOT area: 3.1 cm2     ______________________________________________________________________________  Report approved by: Mary Kevin 06/28/2021 10:20 AM               Discharge Medications   Current Discharge Medication List      CONTINUE these medications which have NOT CHANGED    Details   ASPIRIN 81 MG OR TABS Take 81 mg by mouth daily       atorvastatin (LIPITOR) 10 MG tablet Take 1 tablet (10 mg) by mouth daily Pt is requesting that she only receive Mylan Brand.  Qty: 90 tablet, Refills: 1    Associated Diagnoses: Mixed hyperlipidemia      calcium-vitamin D (CALCIUM 600 + D) 600-400 MG-UNIT per tablet Take 1 tablet by mouth 2 times daily FOR BONE HEALTH AND FOR VITAMIN D DEFICIENCY (LOW VITAMIN D)  Qty: 100 tablet, Refills: PRN    Associated Diagnoses: Asymptomatic postmenopausal status      carboxymethylcellulose PF (REFRESH PLUS) 0.5 % ophthalmic solution Place 1 drop into both eyes 4 times daily as needed for dry eyes Uncertain of otc product      COQ10 100 MG OR CAPS 2 teaspoonsful of Qunol-liquid daily.  Refills: 0       cyabnocobalamin (VITAMIN B-12) 2500 MCG sublingual tablet Take 2,500 mcg by mouth daily INDICATION: FOR VITAMIN B12 SUPPLEMENTATION - TO IMPROVE MEMORY, BALANCE, SLEEP AND MOOD, DIRECTIONS: PLEASE PLACE UNDER THE TONGUE TO DISSOLVE  Qty: 250 tablet, Refills: 3    Associated Diagnoses: Vitamin B12 deficiency (non anemic)      diazepam (VALIUM) 2 MG tablet Take 1 tablet (2 mg) by mouth At Bedtime  Qty: 90 tablet, Refills: 1    Associated Diagnoses: Meniere's disease, bilateral      hydrochlorothiazide (HYDRODIURIL) 25 MG tablet Take 25 mg by mouth daily      Melatonin 10 MG TABS Take 10 mg by mouth nightly as needed       multivitamin w/minerals (THERA-VIT-M) tablet Take 1 tablet by mouth daily      NONFORMULARY Take 1 tablet by mouth daily Lutein/zeaxanthin      Omega-3 Fatty Acids (FISH OIL) 1200 MG capsule Take 1,200 mg by mouth daily      polyethylene glycol (MIRALAX) powder Take 17 g (1 capful) by mouth daily INDICATION: CONSTIPATION, TO ACHIEVE 1-2 SOFT BMs PER DAY  Qty: 1581 g, Refills: 2    Associated Diagnoses: Constipation, unspecified constipation type      traZODone (DESYREL) 50 MG tablet Take 1 tablet (50 mg) by mouth At Bedtime  Qty: 30 tablet, Refills: 0    Comments: Refills per PCP  Associated Diagnoses: Primary insomnia      UROCIT-K 10 MEQ (1080 MG) CR tablet TAKE 1 TABLET THREE TIMES A DAY WITH MEALS FOR POTASSIUM SUPPLEMENTATION  Qty: 200 tablet, Refills: 1    Associated Diagnoses: Essential hypertension      Vitamin D3 (CHOLECALCIFEROL) 125 MCG (5000 UT) tablet Take 125 mcg by mouth daily      vitamin E (TOCOPHEROL) 1000 units (450 mg) capsule Take 1,000 Units by mouth daily      zinc gluconate 50 MG tablet Take 50 mg by mouth daily Unsure of which zinc formulation      QUEtiapine (SEROQUEL) 50 MG tablet Take 0.5 tablets (25 mg) by mouth At Bedtime TAKE 1 TO 2 TABLETS ( 50  MG) NIGHTLY AS NEEDED FOR SLEEP AID  Qty: 180 tablet, Refills: 1    Associated Diagnoses: Primary insomnia       thyroid (NP THYROID) 15 MG tablet Take 15 mg by mouth daily Took for four days, stopped after development of facial hair           Allergies   Allergies   Allergen Reactions     Dye [Contrast Dye] Hives and Itching     Iodine Hives     Seasonal Allergies

## 2021-06-28 NOTE — ED PROVIDER NOTES
"  History     Chief Complaint:  Chest Pain      The history is provided by the patient.      Carmen Zhang is a 81 year old female who presents with chest pain. The patient first started feeling chest pain around 8:00 pm. The pain went away as she got into the car on there way here and she feels fine now. It is more in the center of her chest. There is also some pain in her neck. This pain is not a normal occurrence. The patient does not have a personal history of heart problems but she has a family history of heart disease. She is very active and does not report any complications when exercising. No association of leg pain or swelling, nausea, vomiting, or diarrhea.     Review of Systems   Cardiovascular: Positive for chest pain. Negative for leg swelling.   Gastrointestinal: Negative for diarrhea, nausea and vomiting.   Musculoskeletal: Positive for neck pain.   All other systems reviewed and are negative.    Allergies:  Dye [Contrast Dye]  Iodine  Seasonal Allergies      Medications:    Lipitor   Valium   Synthroid   Lutein   Melatonin   Miralax   Seroquel   Desyrel     Past Medical History:    Incontinence  Herpes simplex   Lumbago    Meniere's Disease   Ovarian Failure   Insomnia   Vitamin D Deficiency   Hypervitaminosis D   Vitamin B12 Deficiency   Essential HTN   Atrophic Vaginitis   HLD   Osteoporosis   Chronic Constipation     Past Surgical History:    Breast Biopsy   Appendectomy   Hysterectomy   Pericardial Window   Vein Stripping   Bladder Suspension     Family History:    Mother - Esophageal Cancer, MI  Father - MI, Prostate Cancer, Parkinson's   Brother - Obesity, Alzheimer's, CAD  Daughter - Arthritis, Hip presentation      Social History:  Patient was accompanied to the ED with .    Physical Exam     Patient Vitals for the past 24 hrs:   BP Temp Temp src Pulse Resp SpO2 Height Weight   06/27/21 2208 -- -- -- -- -- -- 1.575 m (5' 2\") 46.7 kg (103 lb)   06/27/21 2207 134/54 -- -- 69 18 97 % " -- --   21 2203 -- 98.4  F (36.9  C) Oral -- -- -- -- --       Physical Exam  Eyes:  The pupils are equal and round    Conjunctivae and sclerae are normal  ENT:    The nose is normal    Pinnae are normal  CV:  Regular rate and rhythm     No edema    Equal radial pulses bilaterally  Resp:  Lungs are clear    Non-labored    No rales    No wheezing   GI:  Abdomen is soft, there is no rigidity    No distension    No rebound tenderness   MS:  Normal muscular tone    No asymmetric leg swelling  Skin:  No rash or acute skin lesions noted  Neuro:   Awake, alert.      Speech is normal and fluent.    Face is symmetric.     Moves all extremities      Emergency Department Course   EC  ECG taken at 2206, ECG read at 2326  Sinus Rhythm   Normal ECG   Rate 69 bpm. NC interval 132 ms. QRS duration 74 ms. QT/QTc 372/398 ms. P-R-T axes 79 72 66.     Laboratory:  Troponin (Collected ): <0.015    CMP: WNL (Creatinine 0.66)     CBC: WBC 6.1, HGB 13,      Emergency Department Course:    Reviewed:  I reviewed nursing notes, vitals, past history and care everywhere    Assessments:  2332 I obtained history and examined the patient as noted above.   2345 I rechecked the patient and explained findings.   0012 I rechecked the patient.     Consults:   0041 I consulted with the Hospitalist, Dr. Melgar.     Interventions:  0203 ASA - 325 mg - PO  0203 Lipitor - 10 mg - PO  0203 Valium - 2 mg - PO  0204 Melatonin - 10 mg - PO    Disposition:  The patient was admitted to the hospital under the care of Dr. Melgar.    Impression & Plan    Medical Decision Making:  Carmen Zhang is a 81 year old female who presents to the emergency department with chest pain.  This started tonight just after 8 PM.  Pain improved as she came here to the emergency department.  She is quite active at baseline.  She is not had chest pain like this ever before and it radiated up towards her neck.  EKG does not show changes from prior and troponin is  negative.  She is reports a strong family history of heart issues.  Additionally given her age and the recent history given of the chest pain event I think it would be appropriate to have her observed in the hospital for further evaluation and treatment.  She is given aspirin.  She also requested her medications to help her sleep tonight and so was given Valium and melatonin as well.  COVID-19 testing obtained for admission.  Discussed patient with Dr. Melgar who agreed accept the patient under observation status for chest pain evaluation.      Covid-19  Carmen Zhang was evaluated during a global COVID-19 pandemic, which necessitated consideration that the patient might be at risk for infection with the SARS-CoV-2 virus that causes COVID-19.   Applicable protocols for evaluation were followed during the patient's care.   COVID-19 was considered as part of the patient's evaluation. The plan for testing is:  a test was obtained during this visit.    Diagnosis:    ICD-10-CM    1. Chest pain, unspecified type  R07.9 Asymptomatic SARS-CoV-2 COVID-19 Virus (Coronavirus) by PCR       Discharge Medications:  New Prescriptions    No medications on file         Scribe Disclosure:  I, Chacho Alvarado, am serving as a scribe at 11:23 PM on 6/27/2021 to document services personally performed by Stuart Valenzuela MD based on my observations and the provider's statements to me.      Stuart Valenzuela MD  06/28/21 8345

## 2021-06-28 NOTE — PHARMACY-ADMISSION MEDICATION HISTORY
Pharmacy Medication History  Admission medication history interview status for the 6/27/2021  admission is complete. See EPIC admission navigator for prior to admission medications     Location of Interview: Patient room, also phone  Medication history sources: patient,  Sukhdeep, surescripts, chart review    Significant changes made to the medication list:  numerous    In the past week, patient estimated taking medication this percent of the time: greater than 90%    Additional medication history information:   She stopped thyroid after 4 days due to facial hair. She is unsure if she is on seroquel and will try to confirm (list is pharmacy-in-progress until then).    Medication reconciliation completed by provider prior to medication history? No    Time spent in this activity: 60 minutes    Prior to Admission medications    Medication Sig Last Dose Taking? Auth Provider   ASPIRIN 81 MG OR TABS Take 81 mg by mouth daily  Past Week at Unknown time Yes Reported, Patient   atorvastatin (LIPITOR) 10 MG tablet Take 1 tablet (10 mg) by mouth daily Pt is requesting that she only receive [employer]. Past Week at Unknown time Yes Martinez Jenkins MD   calcium-vitamin D (CALCIUM 600 + D) 600-400 MG-UNIT per tablet Take 1 tablet by mouth 2 times daily FOR BONE HEALTH AND FOR VITAMIN D DEFICIENCY (LOW VITAMIN D) Past Week at Unknown time Yes Martinez Jenkins MD   carboxymethylcellulose PF (REFRESH PLUS) 0.5 % ophthalmic solution Place 1 drop into both eyes 4 times daily as needed for dry eyes Uncertain of otc product  Yes Unknown, Entered By History   COQ10 100 MG OR CAPS 2 teaspoonsful of Qunol-liquid daily. Past Week at Unknown time Yes Roman Nogueira MD   cyabnocobalamin (VITAMIN B-12) 2500 MCG sublingual tablet Take 2,500 mcg by mouth daily INDICATION: FOR VITAMIN B12 SUPPLEMENTATION - TO IMPROVE MEMORY, BALANCE, SLEEP AND MOOD, DIRECTIONS: PLEASE PLACE UNDER THE TONGUE TO DISSOLVE Past Week at Unknown time Yes  Martinez Jenkins MD   diazepam (VALIUM) 2 MG tablet Take 1 tablet (2 mg) by mouth At Bedtime Past Week at Unknown time Yes Martinez Jenkins MD   hydrochlorothiazide (HYDRODIURIL) 25 MG tablet Take 25 mg by mouth daily Past Week at Unknown time Yes Unknown, Entered By History   Melatonin 10 MG TABS Take 10 mg by mouth nightly as needed   Yes Reported, Patient   multivitamin w/minerals (THERA-VIT-M) tablet Take 1 tablet by mouth daily Past Week at Unknown time Yes Unknown, Entered By History   NONFORMULARY Take 1 tablet by mouth daily Lutein/zeaxanthin Past Week at Unknown time Yes Unknown, Entered By History   Omega-3 Fatty Acids (FISH OIL) 1200 MG capsule Take 1,200 mg by mouth daily Past Week at Unknown time Yes Unknown, Entered By History   polyethylene glycol (MIRALAX) powder Take 17 g (1 capful) by mouth daily INDICATION: CONSTIPATION, TO ACHIEVE 1-2 SOFT BMs PER DAY Past Week at Unknown time Yes Martinez Jenkins MD   traZODone (DESYREL) 50 MG tablet Take 1 tablet (50 mg) by mouth At Bedtime Past Week at Unknown time Yes Karen Vela PA-C   UROCIT-K 10 MEQ (1080 MG) CR tablet TAKE 1 TABLET THREE TIMES A DAY WITH MEALS FOR POTASSIUM SUPPLEMENTATION Past Week at Unknown time Yes Martinez Jenkins MD   Vitamin D3 (CHOLECALCIFEROL) 125 MCG (5000 UT) tablet Take 125 mcg by mouth daily Past Week at Unknown time Yes Unknown, Entered By History   vitamin E (TOCOPHEROL) 1000 units (450 mg) capsule Take 1,000 Units by mouth daily Past Week at Unknown time Yes Unknown, Entered By History   zinc gluconate 50 MG tablet Take 50 mg by mouth daily Unsure of which zinc formulation  Yes Unknown, Entered By History   QUEtiapine (SEROQUEL) 50 MG tablet Take 0.5 tablets (25 mg) by mouth At Bedtime TAKE 1 TO 2 TABLETS ( 50  MG) NIGHTLY AS NEEDED FOR SLEEP AID unsure if she takes  Karen Vela PA-C   thyroid (NP THYROID) 15 MG tablet Take 15 mg by mouth daily Took for four days, stopped after  development of facial hair   Unknown, Entered By History       The information provided in this note is only as accurate as the sources available at the time of update(s)

## 2021-09-03 ENCOUNTER — HOSPITAL ENCOUNTER (EMERGENCY)
Facility: CLINIC | Age: 81
Discharge: HOME | End: 2021-09-03
Attending: PHYSICIAN ASSISTANT | Admitting: PHYSICIAN ASSISTANT
Payer: MEDICARE

## 2021-09-03 VITALS
HEIGHT: 62 IN | SYSTOLIC BLOOD PRESSURE: 157 MMHG | TEMPERATURE: 98.5 F | HEART RATE: 72 BPM | OXYGEN SATURATION: 97 % | WEIGHT: 103 LBS | BODY MASS INDEX: 18.95 KG/M2 | RESPIRATION RATE: 18 BRPM | DIASTOLIC BLOOD PRESSURE: 65 MMHG

## 2021-09-03 DIAGNOSIS — M67.432 GANGLION CYST OF WRIST, LEFT: ICD-10-CM

## 2021-09-03 DIAGNOSIS — M25.532 LEFT WRIST PAIN: ICD-10-CM

## 2021-09-03 PROCEDURE — 99282 EMERGENCY DEPT VISIT SF MDM: CPT

## 2021-09-03 ASSESSMENT — ENCOUNTER SYMPTOMS
FEVER: 0
NUMBNESS: 0
WEAKNESS: 0

## 2021-09-03 ASSESSMENT — MIFFLIN-ST. JEOR: SCORE: 875.45

## 2021-09-03 NOTE — ED PROVIDER NOTES
History     Chief Complaint:  Wrist Pain (has lump on left wrist denies injury)       HPI   Carmen Zhang is a 81 year old female who presents the emergency department for the evaluation of left wrist discomfort.  The patient reports that she experienced a gradual onset of left wrist pain approximately 1 week prior.  Reports that ibuprofen significantly improves her discomfort.  Given the persistent nature of her pain, she presented to the emergency department for further evaluation.  At the time the exam, the patient denied fever, chills, numbness or tingling in her fingers, decreased  strength, swelling, or any other medical concerns.    Allergies:  Diatrizoate  Dye [Contrast Dye]  Iodine  Seasonal Allergies     Medications:    ASPIRIN 81 MG OR TABS  atorvastatin (LIPITOR) 10 MG tablet  calcium-vitamin D (CALCIUM 600 + D) 600-400 MG-UNIT per tablet  carboxymethylcellulose PF (REFRESH PLUS) 0.5 % ophthalmic solution  COQ10 100 MG OR CAPS  cyabnocobalamin (VITAMIN B-12) 2500 MCG sublingual tablet  diazepam (VALIUM) 2 MG tablet  hydrochlorothiazide (HYDRODIURIL) 25 MG tablet  Melatonin 10 MG TABS  multivitamin w/minerals (THERA-VIT-M) tablet  NONFORMULARY  Omega-3 Fatty Acids (FISH OIL) 1200 MG capsule  polyethylene glycol (MIRALAX) powder  traZODone (DESYREL) 50 MG tablet  UROCIT-K 10 MEQ (1080 MG) CR tablet  Vitamin D3 (CHOLECALCIFEROL) 125 MCG (5000 UT) tablet  vitamin E (TOCOPHEROL) 1000 units (450 mg) capsule  zinc gluconate 50 MG tablet      Past Medical History:    Past Medical History:   Diagnosis Date     Female stress incontinence      Herpes simplex without mention of complication      Lumbago 8/17/2006     Meniere's disease      Other ovarian failure      Personal history of other disorders of nervous system and sense organs        Patient Active Problem List    Diagnosis Date Noted     Advanced directives, counseling/discussion 04/19/2011     Priority: High     Advance Directive Problem List  Overview:    April 25, 2011    Discussed advance care planning with patient; however, patient declined at this time.     Patient referred to Advanced Care Planning Facilitator Regarding questions about donation of organs -   Specifically her eyes.   Facilitator called patient Who states she has a cousin who lives in [place] He is diabetic and last time patient visited him,he was having trouble with his vision and told pt.-he could hardly see.   Patient's concern is that she would like to specifically donate her eyes to him and him alone.   Facilitator has posed the question with a group of Advanced Care Planning Facilitators As to where pt. Would contact to get more information about specific organ donations   Did receive information back from one facilitator - that pt's relative would need to research as well thru his physician If he would be candidate for eye transplant And raised question re: if pt. And cousin would be a match.   Facilitator gave this piece of information to patient She plans to write cousin and find out more information about his status.   Facilitator discussed advanced care planning/directives with patient as well. Patient states she feels both her parents were victims of euthanasia.   Facilitator discussed all the more reason to have advanced care directive with wishes listed, and health care agents engaged.   Patient states will think about program -   Patient will be traveling in the two months Will call patient back With update re: organ donation infor as well.    Last edited by: Tamiko Cho (2/21/2011 10:07 AM)           Radha Garcias PA-C  09/03/21 1837         Chest pain, unspecified type 06/28/2021     Priority: Medium     Syncope 01/04/2020     Priority: Medium     Meniere's disease, bilateral 08/08/2017     Priority: Medium     Primary insomnia 08/08/2017     Priority: Medium     Constipation, unspecified constipation type 08/08/2017     Priority: Medium     Asymptomatic  postmenopausal status 08/08/2017     Priority: Medium     Memory problem 08/08/2017     Priority: Medium     Vitamin D deficiency 08/08/2017     Priority: Medium     Hypervitaminosis D 02/16/2016     Priority: Medium     PT TAKING TOO MUCH D SUPPLEMENT       Vitamin B12 deficiency (non anemic) 02/16/2016     Priority: Medium     Essential hypertension 02/16/2016     Priority: Medium     HTN (hypertension) 07/30/2014     Priority: Medium     Atrophic vaginitis 01/25/2013     Priority: Medium     HYPERLIPIDEMIA LDL GOAL <130 10/31/2010     Priority: Medium     Meniere's disease 02/11/2008     Priority: Medium     Problem list name updated by automated process. Provider to review       Constipation 07/11/2007     Priority: Medium     Problem list name updated by automated process. Provider to review       Mixed hyperlipidemia 11/16/2006     Priority: Medium     Osteoporosis 11/16/2006     Priority: Medium     Problem list name updated by automated process. Provider to review        Past Surgical History:    Past Surgical History:   Procedure Laterality Date     BREAST BIOPSY, RT/LT       C APPENDECTOMY       HYSTERECTOMY, VAGINAL      ovaries left     Z NONSPECIFIC PROCEDURE  33 yrs old    removal of growth on heart      [facility] NONSPECIFIC PROCEDURE      bladder suspension     [facility] NONSPECIFIC PROCEDURE      vein stripping        Family History:    family history includes Blood Disease in her maternal grandfather; C.A.D. in her brother, father, and mother; Cancer in her mother; Cerebrovascular Disease in her maternal grandmother; Connective Tissue Disorder in her daughter and daughter; Diabetes in her maternal aunt and maternal aunt; Neurologic Disorder in her father; Obesity in her brother; Prostate Cancer in her father.    Social History:   reports that she quit smoking about 60 years ago. Her smoking use included cigarettes. She has a 39.00 pack-year smoking history. She has never used smokeless tobacco. She reports  "that she does not drink alcohol and does not use drugs.    PCP: Martinez Jenkins     Review of Systems   Constitutional: Negative for fever.   Musculoskeletal:        Left wrist pain.      Neurological: Negative for weakness and numbness.   All other systems reviewed and are negative.      Physical Exam     Patient Vitals for the past 24 hrs:   BP Temp Temp src Pulse Resp SpO2 Height Weight   09/03/21 1600 (!) 157/65 98.5  F (36.9  C) Oral 72 18 97 % 1.575 m (5' 2\") 46.7 kg (103 lb)        Physical Exam   Vitals signs and nursing note reviewed.   HENT:      Nose: Nose normal. No congestion or rhinorrhea.   Eyes:      General: No scleral icterus.     Extraocular Movements: Extraocular movements intact.   Cardiovascular:      Rate and Rhythm: Normal Rate.     Pulses: Normal pulses.   Pulmonary:      Effort: Pulmonary effort is normal.   Musculoskeletal: Normal range of motion at left wrist. Left wrist: Firm, smooth, rounded, tender and rubbery cystic lesion on volar wrist.   Skin:     General: Skin is warm and dry. No rashes, lesions, or open areas on exposed skin.   Neurological:      Mental Status: Alert. Speech normal. Responds appropriately to questions.  strength symmetric bilaterally. Sensation symmetric in bilateral upper extremities.   Psychiatric:         Mood and Affect: Mood normal.         Behavior: Behavior normal.       Emergency Department Course       Emergency Department Course:  Past medical records, nursing notes, and vitals reviewed.  I performed an exam of the patient and obtained history, as documented above.  Explained my clinical suspicion and discussed outpatient recommendations.  Patient declined imaging.    Impression & Plan      Medical Decision Making:  Carmen Zhang is an 1-year-old female who presents to the emergency department for evaluation of left wrist pain.  Vitals are reviewed.  Patient afebrile and hemodynamically stable.  On physical exam, the patient had a firm, " smooth, rounded, tender and rubbery cystic lesion on the volar wrist concerning for a ganglion cyst.  They were no motor deficits or sensory deficits appreciated on physical exam.  No swelling, warmth, or erythema to suggest infection.  Imaging was discussed with the patient however she deferred.  Given overall well appearance and concern for a catastrophic process, I felt safe discharging the patient home.  She was advised to follow-up with her primary care provider/orthopedic specialist if symptoms persist despite outpatient recommendations as surgical intervention may be warranted.  Strict return precautions were discussed.  All questions and concerns were addressed prior to discharge.  Of note, we did discuss short-term immobilization however, she deferred.    Diagnosis:    ICD-10-CM    1. Left wrist pain  M25.532    2. Ganglion cyst of wrist, left  M67.432         Discharge Medications:  Discharge Medication List as of 9/3/2021  5:56 PM           9/3/2021   No att. providers found

## 2021-09-03 NOTE — DISCHARGE INSTRUCTIONS
Please review attached instructions.  Follow-up with your primary care provider/orthopedic specialist if symptoms persist despite outpatient recommendations.  Return to the emergency department if you develop fever, numbness or tingling in your fingers, or any other medical concerns.

## 2021-10-14 ENCOUNTER — HOSPITAL ENCOUNTER (EMERGENCY)
Facility: CLINIC | Age: 81
Discharge: HOME | End: 2021-10-14
Attending: EMERGENCY MEDICINE | Admitting: EMERGENCY MEDICINE
Payer: MEDICARE

## 2021-10-14 ENCOUNTER — APPOINTMENT (OUTPATIENT)
Dept: CT IMAGING | Facility: CLINIC | Age: 81
End: 2021-10-14
Attending: EMERGENCY MEDICINE
Payer: MEDICARE

## 2021-10-14 VITALS
DIASTOLIC BLOOD PRESSURE: 49 MMHG | TEMPERATURE: 97.8 F | OXYGEN SATURATION: 98 % | RESPIRATION RATE: 18 BRPM | SYSTOLIC BLOOD PRESSURE: 138 MMHG | HEART RATE: 62 BPM

## 2021-10-14 DIAGNOSIS — M54.50 ACUTE MIDLINE LOW BACK PAIN WITHOUT SCIATICA: ICD-10-CM

## 2021-10-14 DIAGNOSIS — M51.26 PROTRUSION OF LUMBAR INTERVERTEBRAL DISC: ICD-10-CM

## 2021-10-14 PROCEDURE — 99284 EMERGENCY DEPT VISIT MOD MDM: CPT | Mod: 25

## 2021-10-14 PROCEDURE — G1004 CDSM NDSC: HCPCS

## 2021-10-14 RX ORDER — LIDOCAINE 4 G/G
1 PATCH TOPICAL EVERY 24 HOURS
Qty: 12 PATCH | Refills: 0 | Status: ON HOLD | OUTPATIENT
Start: 2021-10-14 | End: 2023-04-30

## 2021-10-14 ASSESSMENT — ENCOUNTER SYMPTOMS
NUMBNESS: 0
WEAKNESS: 0
GASTROINTESTINAL NEGATIVE: 1
ARTHRALGIAS: 1

## 2021-10-14 NOTE — ED PROVIDER NOTES
History   Chief Complaint:  Back Pain    HPI   Carmen Zhang is a 81 year old female with history of hypertension who presents with back pain. The patient developed back pain when se bent over, instead of squatting down, while picking up an item at her kitchen. She reports non-radiating pain to one of her vertebrae and endorses tenderness on the site. She took ibuprofen without known relief. Denies any weakness, numbness, bowel or bladder incontinence, perineal/perirectal anesthesia. She also denies any surgeries to the back.  No blood thinner use.    Review of Systems   Gastrointestinal: Negative.    Genitourinary: Negative.    Musculoskeletal: Positive for arthralgias.   Neurological: Negative for weakness and numbness.   All other systems reviewed and are negative.    Allergies:  Diatrizoate  Dye [Contrast Dye]  Iodine  Seasonal Allergies    Medications:  ASPIRIN 81 MG OR TABS  atorvastatin (LIPITOR) 10 MG tablet  calcium-vitamin D (CALCIUM 600 + D) 600-400 MG-UNIT per tablet  carboxymethylcellulose PF (REFRESH PLUS) 0.5 % ophthalmic solution  COQ10 100 MG OR CAPS  cyabnocobalamin (VITAMIN B-12) 2500 MCG sublingual tablet  diazepam (VALIUM) 2 MG tablet  hydrochlorothiazide (HYDRODIURIL) 25 MG tablet  Melatonin 10 MG TABS  multivitamin w/minerals (THERA-VIT-M) tablet  NONFORMULARY  Omega-3 Fatty Acids (FISH OIL) 1200 MG capsule  polyethylene glycol (MIRALAX) powder  traZODone (DESYREL) 50 MG tablet  UROCIT-K 10 MEQ (1080 MG) CR tablet  Vitamin D3 (CHOLECALCIFEROL) 125 MCG (5000 UT) tablet  vitamin E (TOCOPHEROL) 1000 units (450 mg) capsule  zinc gluconate 50 MG tablet    Past Medical History:     Female stress incontinence   Herpes simplex without mention of complication   Lumbago   Meniere's disease   Ovarian failure  Personal history of other disorders of nervous system and sense organs   Mixed hyperlipidemia  Osteoporosis  Constipation  Advanced directives, counseling/discussion  Atrophic vaginitis  HTN  (hypertension)  Hypervitaminosis D  Vitamin B12 deficiency  Essential hypertension  Primary insomnia  Constipation, unspecified constipation type  Asymptomatic postmenopausal status  Memory problem  Vitamin D deficiency  Syncope    Past Surgical History:    Breast biopsy  Appendectomy  Hysterectomy     Family History:    Esophageal cancer  CAD  MI  Prostate cancer  Parkinson's  Cerebrovascular disease  Blood disease  Diabetes  Athritis    Social History:  The patient presents at the ED with her .    Physical Exam     Patient Vitals for the past 24 hrs:   BP Temp Temp src Pulse Resp SpO2   10/14/21 1038 138/49 97.8  F (36.6  C) Temporal 62 18 98 %     Physical Exam  VS: Reviewed per above  HENT: Mucous membranes moist, no nuchal rigidity  EYES: sclera anicteric  CV: Rate as noted, regular rhythm.   RESP: Effort normal. Breath sounds are normal bilaterally.  GI: no tenderness/rebound/guarding, not distended.  No CVA tenderness.  NEURO: GCS 15, cranial nerves II through XII are intact, 5 out of 5 strength in all 4 extremities, sensation is intact light touch in all 4 extremities  MSK: No deformity of the extremities. Point tenderness over the mid low lumbar spine.  SKIN: Warm and dry    Emergency Department Course     Imaging:  CT Lumbar Spine w/o Contrast  IMPRESSION:  1. No evidence for fracture or any posterior malalignment.  2. Multilevel degenerative disc and facet disease, most advanced at  L3-L4 where there is an associated left paramedian disc protrusion  resulting in moderate to severe central canal stenosis.  Per radiology.    Emergency Department Course:  Reviewed:  I reviewed nursing notes, vitals, past medical history and Care Everywhere    Assessments:  1233 I obtained history and examined the patient as noted above.     Disposition:  The patient was discharged to home.     Impression & Plan     CMS Diagnoses: None    Medical Decision Making:  Carmen Zhang presented with back pain.  Vital  signs are reassuring.  With history of osteoporosis documented in her chart, CT was obtained to rule out occult compression fracture.  Fortunately no fracture was identified.  Pt has no back pain red flags on history or exam to suggest spinal cord compression syndrome (cauda equina syndrome, spinal/epidural space hematoma), spinal column infection (epidural abscess, discitis, osteomyelitis), nor malignancy/metastatic disease.     There is no current evidence of radiculopathy or myelopathy although CT does show left paramedian disc protrusion at L3-L4 resulting in moderate to severe central canal stenosis.  Patient did not wish to start steroids but was interested in lidocaine patches in addition to over-the-counter analgesia.  Plan for close primary care follow-up and if not improving follow-up with spine surgery.    Carmen Davila was directed to avoid heavy lifting, bending or twisting. She was told to return for:  increasing pain, numbness, weakness, or bowel or bladder dysfunction.      Diagnosis:    ICD-10-CM    1. Protrusion of lumbar intervertebral disc  M51.26    2. Acute midline low back pain without sciatica  M54.50        Discharge Medications:  Discharge Medication List as of 10/14/2021  1:33 PM      START taking these medications    Details   Lidocaine (LIDOCARE) 4 % Patch Place 1 patch onto the skin every 24 hours To prevent lidocaine toxicity, patient should be patch free for 12 hrs daily.Disp-12 patch, R-0Local Print             Scribe Disclosure:  I, Aida Diaz, am serving as a scribe at 12:33 PM on 10/14/2021 to document services personally performed by Moustapha Michel MD based on my observations and the provider's statements to me.        Moustapha Michel MD  10/14/21 0069

## 2021-11-18 ENCOUNTER — HOSPITAL ENCOUNTER (EMERGENCY)
Facility: CLINIC | Age: 81
Discharge: HOME | End: 2021-11-18
Attending: NURSE PRACTITIONER | Admitting: NURSE PRACTITIONER
Payer: MEDICARE

## 2021-11-18 VITALS
WEIGHT: 105 LBS | SYSTOLIC BLOOD PRESSURE: 165 MMHG | DIASTOLIC BLOOD PRESSURE: 68 MMHG | HEART RATE: 95 BPM | TEMPERATURE: 98.3 F | RESPIRATION RATE: 18 BRPM | OXYGEN SATURATION: 96 % | BODY MASS INDEX: 19.2 KG/M2

## 2021-11-18 DIAGNOSIS — M62.830 BACK MUSCLE SPASM: ICD-10-CM

## 2021-11-18 DIAGNOSIS — M54.50 LEFT-SIDED LOW BACK PAIN WITHOUT SCIATICA: ICD-10-CM

## 2021-11-18 PROCEDURE — 250N000013 HC RX MED GY IP 250 OP 250 PS 637: Performed by: NURSE PRACTITIONER

## 2021-11-18 PROCEDURE — 99283 EMERGENCY DEPT VISIT LOW MDM: CPT

## 2021-11-18 RX ORDER — IBUPROFEN 600 MG/1
600 TABLET, FILM COATED ORAL ONCE
Status: COMPLETED | OUTPATIENT
Start: 2021-11-18 | End: 2021-11-18

## 2021-11-18 RX ORDER — METHOCARBAMOL 750 MG/1
750 TABLET, FILM COATED ORAL ONCE
Status: COMPLETED | OUTPATIENT
Start: 2021-11-18 | End: 2021-11-18

## 2021-11-18 RX ORDER — METHOCARBAMOL 750 MG/1
750 TABLET, FILM COATED ORAL 3 TIMES DAILY PRN
Qty: 21 TABLET | Refills: 0 | Status: SHIPPED | OUTPATIENT
Start: 2021-11-18 | End: 2021-11-25

## 2021-11-18 RX ADMIN — METHOCARBAMOL 750 MG: 750 TABLET ORAL at 18:21

## 2021-11-18 RX ADMIN — IBUPROFEN 600 MG: 600 TABLET ORAL at 18:21

## 2021-11-18 ASSESSMENT — ENCOUNTER SYMPTOMS
DYSURIA: 0
BACK PAIN: 1
WEAKNESS: 0
NUMBNESS: 0

## 2021-11-18 NOTE — ED TRIAGE NOTES
Left hip/low back pain, started 4-5 days ago. Increasing in severity. No trauma. Using lidocaine patches without relief.

## 2021-11-18 NOTE — ED PROVIDER NOTES
History   Chief Complaint:  Hip pain     HPI   Carmen Zhang is a 81 year old female with history of lumbago, osteoporosis, hypertension and hyperlipidemia who presents with left hip and low back pain. The patient reports that for the past 5-6 days she has been having worsening left lower back and hip pain. Her  states that she was evaluated for similar pain about a month ago and was prescribed lidocaine patches, which seemed to help. However, her  states that the pain seems to be more lateral than it was a month ago. The patient denies any recent falls or injuries that might have caused her pain. She has been taking 200mg ibuprofen occasionally for her pain.     Review of Systems   Genitourinary: Negative for dysuria.   Musculoskeletal: Positive for back pain (Lower left).   Neurological: Negative for weakness and numbness.   All other systems reviewed and are negative.    Allergies:  Diatrizoate  Dye [Contrast Dye]  Iodine  Seasonal Allergies    Medications:  Aspirin 81 mg   Lipitor  Valium  Hydrodiuril  Miralax  Desyrel    Past Medical History:     Female stress incontinence  Herpes simplex   Lumbago  Meniere's disease   Ovarian failure  Hyperlipidemia  Osteoporosis  Hypertension  Vitamin B12 deficiency  Insomnia  Vitamin D deficiency  Memory problem  Syncope    Past Surgical History:    Breast biopsy  Appendectomy  Hysterectomy  Growth on heart removed  Bladder suspension  Vein stripping   Salpingo-oophorectomy  Inner ear surgery  Pericardial window    Family History:    Mother: esophageal cancer, coronary artery disease, myocardial infarction.  Father: coronary artery disease, myocardial infarction, Parkinson's disease  Brother: coronary artery disease, obesity    Social History:  The patient presents with her .     Physical Exam     Patient Vitals for the past 24 hrs:   BP Temp Temp src Pulse Resp SpO2 Weight   11/18/21 1735 (!) 165/68 -- -- -- -- -- --   11/18/21 1734 -- 98.3  F  (36.8  C) Oral 95 18 96 % 47.6 kg (105 lb)       Physical Exam  Nursing notes reviewed. Vitals reviewed.  General: Alert.  No obvious discomfort . Well kept.  HENT: Normal voice.   Neck: non-tender. Full ROM, no bony deformity, step-off, or crepitus. No obvious bilateral paracervical muscle spasm.  Eyes: Sclera and conjunctiva normal  Pulmonary: Normal respiratory effort. No cough.    Musculoskeletal: Normal gross range of motion of all 4 extremities. No spinal tenderness, deformity, step-off, or crepitus.  Paravertebral SI area muscular spasming and tenderness.  This is point tenderness and is exactly reproducible.  Neurological: Alert. Normal speech. Responds appropriately. Normal sensation and strength distally.  Skin: Warm and dry. Normal appearance of visualized exposed skin.  Psych: Affect normal. Normal personal interaction. Good eye contact.      Emergency Department Course   Emergency Department Course:  Reviewed:  I reviewed nursing notes, vitals, past medical history and Care Everywhere    Assessments:  1808 I obtained history and examined the patient as noted above.   1920 I rechecked the patient and explained findings.     Interventions:  1821 Robaxin 750 mg Oral  1821 Ibuprofen 600 mg Oral     Disposition:  The patient was discharged home.     Impression & Plan     Medical Decision Making:  Carmen Zhang is a 81 year old female presented today with concerns of back pain.  Unable to manage discomfort at home she presented for evaluation. Exam showed muscular source of discomfort. No focal neurological findings, no red flags for cauda equina, epidural abscess, or meningitis. Imagery not indicated. Advised to use appropriate dosing of ibuprofen, methocarbamol, use ice, and stretch. RX for Methocarbamol given. Follow up with PCP in 5-7 days if no improvement immediately if worsening. Advised to return to ED if develops numbness, weakness, loss of bowel or bladder, or for other concerns.      Diagnosis:    ICD-10-CM    1. Back muscle spasm  M62.830    2. Left-sided low back pain without sciatica  M54.50        Discharge Medications:  New Prescriptions    METHOCARBAMOL (ROBAXIN) 750 MG TABLET    Take 1 tablet (750 mg) by mouth 3 times daily as needed for muscle spasms       Scribe Disclosure:  I, Harrison Yanez, am serving as a scribe on 11/18/2021 at 6:00 PM to personally document services performed by Jacob Merrill APRN based on my observations and the provider's statements to me.          Jacob Merrill APRN CNP  11/18/21 1927

## 2021-11-19 NOTE — DISCHARGE INSTRUCTIONS
Ibuprofen or Naproxen and/or Tylenol scheduled for the next 3-5 days. 400-600 mg (two-three tabs) ibuprofen, 440 mg (two tabs) Naproxen, 650-1000 mg of Tylenol.  Ibuprofen and Tylenol are every 6 hours Naproxen is 2 times daily. Follow directions. Use OTC lidocaine patches as directed.  Ice or heat to area.  I recommend ice in the first 24-48 hours. Apply ice for no more than 20 minutes at a time with at least an hour break in between applications.  3-5 times daily is recommended.

## 2022-08-03 ENCOUNTER — HOSPITAL ENCOUNTER (EMERGENCY)
Facility: CLINIC | Age: 82
Discharge: HOME | End: 2022-08-03
Attending: NURSE PRACTITIONER | Admitting: NURSE PRACTITIONER
Payer: MEDICARE

## 2022-08-03 VITALS
SYSTOLIC BLOOD PRESSURE: 113 MMHG | DIASTOLIC BLOOD PRESSURE: 62 MMHG | RESPIRATION RATE: 16 BRPM | OXYGEN SATURATION: 96 % | HEART RATE: 65 BPM

## 2022-08-03 DIAGNOSIS — L03.011 PARONYCHIA OF RIGHT INDEX FINGER: ICD-10-CM

## 2022-08-03 PROCEDURE — 10060 I&D ABSCESS SIMPLE/SINGLE: CPT

## 2022-08-03 PROCEDURE — 99283 EMERGENCY DEPT VISIT LOW MDM: CPT | Mod: 25

## 2022-08-03 RX ORDER — CEPHALEXIN 500 MG/1
500 CAPSULE ORAL 4 TIMES DAILY
Qty: 28 CAPSULE | Refills: 0 | Status: SHIPPED | OUTPATIENT
Start: 2022-08-03 | End: 2022-08-10

## 2022-08-03 ASSESSMENT — ENCOUNTER SYMPTOMS: COLOR CHANGE: 1

## 2022-08-04 ASSESSMENT — ENCOUNTER SYMPTOMS
FEVER: 0
ARTHRALGIAS: 0
WOUND: 1
MYALGIAS: 0
NAUSEA: 0

## 2022-08-04 NOTE — ED PROVIDER NOTES
History   Chief Complaint:  Finger pain      The history is provided by the patient.      Carmen Zhang is a 82 year old right handed female who presents with left index finger pain, ongoing for one week. She reports that she had an onset of finger pain after pulling at a cuticle and has tried using an unspecified antibiotic ointment for symptom management. She has normal sensation and range of motion of her hand and fingers. She endorses pain with palpation, but denies any drainage. She denies any other injuries or symptoms.     Review of Systems   Constitutional: Negative for fever.   Gastrointestinal: Negative for nausea.   Musculoskeletal: Negative for arthralgias and myalgias.   Skin: Positive for color change and wound.   All other systems reviewed and are negative.    Allergies:  Diatrizoate  Dye [Contrast Dye]  Iodine  Seasonal Allergies    Medications:  Aspirin   Lipitor   Valium   Hydrodiuril   Melatonin   Desyrel   Tocopherol     Past Medical History:     Hyperlipidemia   Osteoporosis   Constipation   Meniere's disease  Insomnia   Constipation   Memory problem   Vitamin D deficiency  Syncope  Chest pain     Past Surgical History:    Hysterectomy   Appendectomy   Removal of growth on heart   Vein stripping     Family History:    Cancer   CAD  Prostate cancer   Parkinson's  Obesity     Social History:  The patient presents to the ED alone.  Arrived by private vehicle.     Physical Exam     Physical Exam  Nursing notes reviewed. Vitals reviewed.  General: Alert. Well kept.  Eyes:  Conjunctiva non-injected, non-icteric.  Neck/Throat: Moist mucous membranes. Normal voice.  Cardiac: Regular rhythm. Normal heart sounds.  Pulmonary: Clear and equal breath sounds bilaterally.   Musculoskeletal: Normal gross range of motion of all 4 extremities.   Neurological: Alert and oriented x4.   Skin: Left index finger radial aspect paronychia   Psych: Affect normal. Good eye contact.    Emergency Department Course      Procedures      Incision and Drainage     Procedure: Incision and Drainage     Consent: Verbal    Indication: Paronychia    Location: Extremity, upper    Size: 1.0 cm    Ultrasound Guidance: No    Preparation: Povidone-iodine     Anesthesia/Sedation: Bupivacaine - 0.5%     Procedure Detail:    Aspiration: No  Incision Type: Single straight  Scalpel: 15  Lesion Management: Probed and deloculated   Wound Management: Left open   Packing: None     Patient Status: The patient tolerated the procedure well: Yes. There were no complications.    Emergency Department Course:    Reviewed:  I reviewed nursing notes, vitals, past medical history and Care Everywhere    Assessments:  2047 I obtained history and examined the patient as noted above.   2106 I rechecked the patient and explained findings. I performed an incision and drainage procedure, see note above.     Disposition:  The patient was discharged to home.     Impression & Plan     Medical Decision Making:  Carmen Zhang is a 82 year old right handed female who presents with left index finger pain, ongoing for one week. History and physical exam are consistent with paronychia and surrounding cellulitis. There does not appear to be any complication of cellulitis such as necrotizing fascitis, lymphangitis, lymphadenitis, abscess, osteomyelitis, or sepsis.  The patient is not immunosuppressed.  I&D performed with minimal exudate.  Plan is for supportive outpatient management including antibiotics. No indication for lab work or imaging.  Exam is not consistent with osteomyelitis.  Close follow-up of primary care physician to ensure no progression and rapid resolution. Cellulitis precautions for home.  The patient and her  left with complete understanding and agreement with this plan and no other complaints.      Diagnosis:    ICD-10-CM    1. Paronychia of right index finger  L03.011        Discharge Medications:  Discharge Medication List as of 8/3/2022  9:27  PM      START taking these medications    Details   cephALEXin (KEFLEX) 500 MG capsule Take 1 capsule (500 mg) by mouth 4 times daily for 7 days, Disp-28 capsule, R-0, E-Prescribe             Scribe Disclosure:  I, Kaelyn Mantilla, am serving as a scribe at 8:47 PM on 8/3/2022 to document services personally performed by Palak Beltran, KYLE based on my observations and the provider's statements to me.            Palak Beltran, CNP  08/04/22 0023

## 2022-10-04 ENCOUNTER — OFFICE VISIT (OUTPATIENT)
Dept: URGENT CARE | Facility: URGENT CARE | Age: 82
End: 2022-10-04
Payer: MEDICARE

## 2022-10-04 VITALS
HEART RATE: 78 BPM | WEIGHT: 108 LBS | RESPIRATION RATE: 20 BRPM | BODY MASS INDEX: 19.75 KG/M2 | OXYGEN SATURATION: 98 % | SYSTOLIC BLOOD PRESSURE: 124 MMHG | DIASTOLIC BLOOD PRESSURE: 66 MMHG | TEMPERATURE: 98.3 F

## 2022-10-04 DIAGNOSIS — L03.119 CELLULITIS AND ABSCESS OF LEG: ICD-10-CM

## 2022-10-04 DIAGNOSIS — L08.9 INFECTED ABRASION OF LEFT LOWER EXTREMITY, INITIAL ENCOUNTER: Primary | ICD-10-CM

## 2022-10-04 DIAGNOSIS — L02.419 CELLULITIS AND ABSCESS OF LEG: ICD-10-CM

## 2022-10-04 DIAGNOSIS — S80.812A INFECTED ABRASION OF LEFT LOWER EXTREMITY, INITIAL ENCOUNTER: Primary | ICD-10-CM

## 2022-10-04 PROCEDURE — 99213 OFFICE O/P EST LOW 20 MIN: CPT | Performed by: PHYSICIAN ASSISTANT

## 2022-10-04 RX ORDER — CEPHALEXIN 500 MG/1
500 CAPSULE ORAL 3 TIMES DAILY
Qty: 30 CAPSULE | Refills: 0 | Status: SHIPPED | OUTPATIENT
Start: 2022-10-04 | End: 2022-10-04

## 2022-10-04 RX ORDER — MUPIROCIN 20 MG/G
OINTMENT TOPICAL 3 TIMES DAILY
Qty: 30 G | Refills: 0 | Status: SHIPPED | OUTPATIENT
Start: 2022-10-04 | End: 2022-10-04

## 2022-10-04 RX ORDER — MUPIROCIN 20 MG/G
OINTMENT TOPICAL 3 TIMES DAILY
Qty: 30 G | Refills: 0 | Status: SHIPPED | OUTPATIENT
Start: 2022-10-04

## 2022-10-04 RX ORDER — CEPHALEXIN 500 MG/1
500 CAPSULE ORAL 3 TIMES DAILY
Qty: 30 CAPSULE | Refills: 0 | Status: ON HOLD | OUTPATIENT
Start: 2022-10-04 | End: 2023-04-30

## 2022-10-04 NOTE — PROGRESS NOTES
Assessment & Plan     Infected abrasion of left lower extremity, initial encounter    Left lower leg abrasion, wound with extending erythema and warmth from the wound    - mupirocin (BACTROBAN) 2 % external ointment; Apply topically 3 times daily    Cellulitis and abscess of leg    Cellulitis is an infection of the deep layers of skin. A break in the skin, such as a cut or scratch, can let bacteria under the skin. If the bacteria get to deep layers of the skin, it can be serious. If not treated, cellulitis can get into the bloodstream and lymph nodes. The infection can then spread throughout the body. This causes serious illness.   Cellulitis causes the affected skin to become red, swollen, warm, and sore. The reddened areas have a visible border. An open sore may leak fluid (pus). You may have a fever, chills, and pain.   Cellulitis is treated with antibiotics taken for 7 to 10 days. An open sore may be cleaned and covered with cool wet gauze. Symptoms should get better 1 to 2 days after treatment is started. Make sure to take all the antibiotics for the full number of days until they are gone. Keep taking the medicine even if your symptoms go away.     - cephALEXin (KEFLEX) 500 MG capsule; Take 1 capsule (500 mg) by mouth 3 times daily      At today's visit with Carmen Zhang , we discussed results, diagnosis, medications and formulated a plan.  We also discussed red flags for immediate return to clinic/ER, as well as indications for follow up with PCP if not improved in 3 days. Patient understood and agreed to plan. Carmen Zhang was discharged with stable vitals and has no further questions.       No follow-ups on file.    Jacob Vergara, [facility], PA-C  [facility]    Subjective   Carmen Davila is a 82 year old, presenting for the following health issues:  Abrasion (Right shin area after hitting on a stool yesterday)      HPI   Review of Systems   Constitutional, HEENT, cardiovascular,  pulmonary, gi and gu systems are negative, except as otherwise noted.      Objective    /66   Pulse 78   Temp 98.3  F (36.8  C)   Resp 20   Wt 49 kg (108 lb)   SpO2 98%   BMI 19.75 kg/m    Body mass index is 19.75 kg/m .  Physical Exam   GENERAL: healthy, alert and no distress  MS: Positive for left lower anterior leg abrasion  SKIN: positive for wound, erythema  NEURO: Normal strength and tone, mentation intact and speech normal  PSYCH: mentation appears normal, affect normal/bright        No results found for any visits on 10/04/22.

## 2023-01-07 ENCOUNTER — APPOINTMENT (OUTPATIENT)
Dept: CT IMAGING | Facility: CLINIC | Age: 83
End: 2023-01-07
Attending: PHYSICIAN ASSISTANT
Payer: MEDICARE

## 2023-01-07 ENCOUNTER — HOSPITAL ENCOUNTER (EMERGENCY)
Facility: CLINIC | Age: 83
Discharge: HOME | End: 2023-01-07
Attending: PHYSICIAN ASSISTANT | Admitting: PHYSICIAN ASSISTANT
Payer: MEDICARE

## 2023-01-07 VITALS
HEART RATE: 75 BPM | SYSTOLIC BLOOD PRESSURE: 147 MMHG | RESPIRATION RATE: 16 BRPM | DIASTOLIC BLOOD PRESSURE: 72 MMHG | OXYGEN SATURATION: 97 % | TEMPERATURE: 99.3 F

## 2023-01-07 DIAGNOSIS — R51.9 NONINTRACTABLE HEADACHE, UNSPECIFIED CHRONICITY PATTERN, UNSPECIFIED HEADACHE TYPE: ICD-10-CM

## 2023-01-07 PROCEDURE — 250N000013 HC RX MED GY IP 250 OP 250 PS 637: Performed by: PHYSICIAN ASSISTANT

## 2023-01-07 PROCEDURE — 99284 EMERGENCY DEPT VISIT MOD MDM: CPT | Mod: 25

## 2023-01-07 PROCEDURE — 70450 CT HEAD/BRAIN W/O DYE: CPT | Mod: MA

## 2023-01-07 RX ORDER — ACETAMINOPHEN 500 MG
1000 TABLET ORAL ONCE
Status: COMPLETED | OUTPATIENT
Start: 2023-01-07 | End: 2023-01-07

## 2023-01-07 RX ADMIN — ACETAMINOPHEN 1000 MG: 500 TABLET ORAL at 19:16

## 2023-01-07 ASSESSMENT — ENCOUNTER SYMPTOMS
HEADACHES: 1
SORE THROAT: 0
RHINORRHEA: 0
VOMITING: 0
NECK PAIN: 0
COUGH: 0

## 2023-01-07 ASSESSMENT — ACTIVITIES OF DAILY LIVING (ADL): ADLS_ACUITY_SCORE: 33

## 2023-01-07 NOTE — ED TRIAGE NOTES
"Pt reports pain on left posterior head. Denies any fall or injury. The pain started yesterday before bed. No bump or laceration present on scalp. Pt denies taking any tylenol for pain today.    Pt states \"its not going away and its painful\" rates pain at 8/10    Denies n/v or blurry vision       "

## 2023-01-08 NOTE — ED PROVIDER NOTES
History     Chief Complaint:  Head Pain       The history is provided by the patient and the spouse.      Carmen Zhang is an otherwise healthy 82 year old female who presents with a worsening posterior head pain since a couple days ago. There was no initial head trauma or injuries around the time of onset. She describes the pain as both a sharp and throbbing pain, and she is not familiar with it. She has been taking ibuprofen for pain management with little relief. She does not have a history of hypertension. She denies visual disturbance, hearing loss, neck pain, gait problem, vomiting, rhinorrhea, sore throat, or cough.     Independent Historian: the patient and the spouse     Review of External Notes: None     ROS:  Review of Systems   HENT: Negative for hearing loss, rhinorrhea and sore throat.    Eyes: Negative for visual disturbance.   Respiratory: Negative for cough.    Gastrointestinal: Negative for vomiting.   Musculoskeletal: Negative for gait problem and neck pain.   Neurological: Positive for headaches.   All other systems reviewed and are negative.      Allergies:  Diatrizoate  Dye [Contrast Dye]  Iodine  Seasonal Allergies     Medications:    Asprin   Lipitor   Keflex   Valium   Hydrochlorothiazide   Desyrel     Past Medical History:    Female stress incontinence   Herpes simplex w/o mention of complication   Lumbago   Meniere's disease   Ovarian failure     Past Surgical History:    Bilateral breast bx   Vaginal hysterectomy w/ L oophorectomy  Heart growth excision   Bladder suspension   Vein stripping      Family History:    Brother - CAD  Father - CAD  Mother - CAD, cancer  Daughter - connective tissue disorder    Social History:  She reports that she quit smoking about 62 years ago. Her smoking use included cigarettes. She has a 39.00 pack-year smoking history. She has never used smokeless tobacco. She reports that she does not drink alcohol and does not use drugs.  PCP: Martinez Jenkins      Physical Exam     Patient Vitals for the past 24 hrs:   BP Temp Temp src Pulse Resp SpO2   01/07/23 1614 (!) 147/72 99.3  F (37.4  C) Temporal 75 16 97 %        Physical Exam  General: Well appearing, pleasant female, resting on exam bed  HEENT: No evidence of trauma.  Conjunctive are clear.  Extraocular eye movements intact.  Neck range of motion intact.  Nose and throat clear.  Respiratory: Good breath sounds bilaterally  Cardiovascular: Normal rate and rhythm   Gastrointestinal: Soft, nontender.   Musculoskeletal: Atraumatic. No tenderness to scalp.   Skin: Exposed skin clear.  Neurologic: Alert and oriented x4.  Gross sensation intact.  Proximal and distal motor strength of the upper and lower extremities intact.  Cerebellar function test intact.  Cranial nerves II through XII intact.  She is alert, answers questions, follows commands, has normal gaze and visual fields without any facial palsy.  Her upper and lower extremities have no drift.  She has no limb ataxia, changes in sensation or language, no dysarthria or neglect. Gait intact..  Psych:  Patient is cooperative, with normal affect.    Emergency Department Course   Imaging:  Head CT w/o contrast  Final Result  IMPRESSION:  1.  No acute transcortical infarct, intracranial hemorrhage, or mass effect.     Report per radiology    Emergency Department Course & Assessments:  Interventions:  1916 Tylenol 1 g PO    Independent Interpretation (X-rays, CTs, rhythm strip):  2008 I reviewing the CT and do not see a mass or anything else abnormal.      Consultations/Discussion of Management or Tests:  1901 I obtained history and examined the patient as noted above.   2016 I rechecked the patient and explained findings. I discussed plan for discharge home.    Social Determinants of Health affecting care:  None     Disposition:  The patient was discharged to home.     Impression & Plan    Medical Decision Making:  Carmen Zhang is a 82 year old female with a  history of hypertension, hyperlipidemia, Ménière's disease, presents to the ER for evaluation of a left occipital headache for the past couple days.  See HPI.  Her vitals are unremarkable.  She has normal neurological exam.  She has no tenderness on exam.  She went for CT of her head that was unremarkable.  She was given Tylenol and feels much better.  I am not suspicious of occult bleed, CVA, meningitis, vertebral dissection, carotid dissection, or other at this time.  She has no other symptoms aside from the headache.  We will manage her with rest, Tylenol, fluids, and close follow-up with primary care, and to return with any new or worsening symptoms.  She is comfortable with the plan and is discharged home with her .  No indication for further imaging or lumbar puncture at this time.    Diagnosis:    ICD-10-CM    1. Nonintractable headache, unspecified chronicity pattern, unspecified headache type  R51.9           Scribe Disclosure:  I, Chacho Alvarado, am serving as a scribe at 6:59 PM on 1/7/2023 to document services personally performed by Marco Antonio Staples PA-C based on my observations and the provider's statements to me.     1/7/2023   Marco Antonio Staples PA-C Cyr, Matthew R, PA-C  01/07/23 2030

## 2023-04-15 ENCOUNTER — HOSPITAL ENCOUNTER (EMERGENCY)
Facility: CLINIC | Age: 83
Discharge: HOME | End: 2023-04-15
Attending: EMERGENCY MEDICINE | Admitting: EMERGENCY MEDICINE
Payer: MEDICARE

## 2023-04-15 ENCOUNTER — APPOINTMENT (OUTPATIENT)
Dept: GENERAL RADIOLOGY | Facility: CLINIC | Age: 83
End: 2023-04-15
Attending: EMERGENCY MEDICINE
Payer: MEDICARE

## 2023-04-15 VITALS
DIASTOLIC BLOOD PRESSURE: 98 MMHG | SYSTOLIC BLOOD PRESSURE: 153 MMHG | TEMPERATURE: 98.1 F | RESPIRATION RATE: 20 BRPM | OXYGEN SATURATION: 95 % | HEART RATE: 86 BPM

## 2023-04-15 DIAGNOSIS — J01.00 SUBACUTE MAXILLARY SINUSITIS: ICD-10-CM

## 2023-04-15 DIAGNOSIS — R05.8 PRODUCTIVE COUGH: ICD-10-CM

## 2023-04-15 LAB
ANION GAP SERPL CALCULATED.3IONS-SCNC: 9 MMOL/L (ref 7–15)
BASOPHILS # BLD AUTO: 0 10E3/UL (ref 0–0.2)
BASOPHILS NFR BLD AUTO: 0 %
BUN SERPL-MCNC: 25.4 MG/DL (ref 8–23)
CALCIUM SERPL-MCNC: 9.8 MG/DL (ref 8.8–10.2)
CHLORIDE SERPL-SCNC: 101 MMOL/L (ref 98–107)
CREAT SERPL-MCNC: 0.69 MG/DL (ref 0.51–0.95)
DEPRECATED HCO3 PLAS-SCNC: 30 MMOL/L (ref 22–29)
EOSINOPHIL # BLD AUTO: 0.2 10E3/UL (ref 0–0.7)
EOSINOPHIL NFR BLD AUTO: 2 %
ERYTHROCYTE [DISTWIDTH] IN BLOOD BY AUTOMATED COUNT: 12.5 % (ref 10–15)
FLUAV RNA SPEC QL NAA+PROBE: NEGATIVE
FLUBV RNA RESP QL NAA+PROBE: NEGATIVE
GFR SERPL CREATININE-BSD FRML MDRD: 85 ML/MIN/1.73M2
GLUCOSE SERPL-MCNC: 109 MG/DL (ref 70–99)
HCT VFR BLD AUTO: 43 % (ref 35–47)
HGB BLD-MCNC: 14 G/DL (ref 11.7–15.7)
IMM GRANULOCYTES # BLD: 0 10E3/UL
IMM GRANULOCYTES NFR BLD: 0 %
LYMPHOCYTES # BLD AUTO: 1.7 10E3/UL (ref 0.8–5.3)
LYMPHOCYTES NFR BLD AUTO: 19 %
MCH RBC QN AUTO: 31.1 PG (ref 26.5–33)
MCHC RBC AUTO-ENTMCNC: 32.6 G/DL (ref 31.5–36.5)
MCV RBC AUTO: 96 FL (ref 78–100)
MONOCYTES # BLD AUTO: 1 10E3/UL (ref 0–1.3)
MONOCYTES NFR BLD AUTO: 11 %
NEUTROPHILS # BLD AUTO: 6.1 10E3/UL (ref 1.6–8.3)
NEUTROPHILS NFR BLD AUTO: 68 %
NRBC # BLD AUTO: 0 10E3/UL
NRBC BLD AUTO-RTO: 0 /100
PLATELET # BLD AUTO: 218 10E3/UL (ref 150–450)
POTASSIUM SERPL-SCNC: 3.6 MMOL/L (ref 3.4–5.3)
RBC # BLD AUTO: 4.5 10E6/UL (ref 3.8–5.2)
RSV RNA SPEC NAA+PROBE: NEGATIVE
SARS-COV-2 RNA RESP QL NAA+PROBE: NEGATIVE
SODIUM SERPL-SCNC: 140 MMOL/L (ref 136–145)
WBC # BLD AUTO: 9 10E3/UL (ref 4–11)

## 2023-04-15 PROCEDURE — 36415 COLL VENOUS BLD VENIPUNCTURE: CPT | Performed by: EMERGENCY MEDICINE

## 2023-04-15 PROCEDURE — 250N000013 HC RX MED GY IP 250 OP 250 PS 637: Performed by: EMERGENCY MEDICINE

## 2023-04-15 PROCEDURE — 71046 X-RAY EXAM CHEST 2 VIEWS: CPT

## 2023-04-15 PROCEDURE — 87637 SARSCOV2&INF A&B&RSV AMP PRB: CPT | Performed by: EMERGENCY MEDICINE

## 2023-04-15 PROCEDURE — 85025 COMPLETE CBC W/AUTO DIFF WBC: CPT | Performed by: EMERGENCY MEDICINE

## 2023-04-15 PROCEDURE — 99284 EMERGENCY DEPT VISIT MOD MDM: CPT | Mod: 25,CS

## 2023-04-15 PROCEDURE — 82310 ASSAY OF CALCIUM: CPT | Performed by: EMERGENCY MEDICINE

## 2023-04-15 RX ADMIN — AMOXICILLIN AND CLAVULANATE POTASSIUM 1 TABLET: 875; 125 TABLET, FILM COATED ORAL at 22:44

## 2023-04-15 ASSESSMENT — ACTIVITIES OF DAILY LIVING (ADL): ADLS_ACUITY_SCORE: 35

## 2023-04-15 ASSESSMENT — ENCOUNTER SYMPTOMS
VOMITING: 0
COUGH: 1
NAUSEA: 0
DIARRHEA: 0
CHILLS: 0
FEVER: 0

## 2023-04-16 NOTE — ED PROVIDER NOTES
History     Chief Complaint:  Cough    The history is provided by the patient and the spouse.      Carmen Zhang is a 82 year old female who presents with a cough. The patient reports five to six days of a productive cough, producing phlegm with a yellowing hue. She also reports nasal congestion with a lot of mucous when she blows her nose, also yellow/green in color. She has completed almost three days of amoxicillin 500 mg TID that she purchased in [place], but says this is not helping, prompting her visit to the ED. She notes that she has been sleeping more. She denies any fever, chills, nausea, vomiting, or diarrhea.  No shortness of breath or chest pain.  No sneezing and doubts allergy trigger as she does not think the several days of warmer weather and start of spring with plants sprouting could have triggered it.    Independent Historian:   Patient    Review of External Notes: NA     ROS:  Review of Systems   Constitutional: Negative for chills and fever.   HENT: Positive for congestion.    Respiratory: Positive for cough.    Gastrointestinal: Negative for diarrhea, nausea and vomiting.   All other systems reviewed and are negative.    Allergies:  Diatrizoate  Contrast Dye  Iodine  Seasonal Allergies     Medications:    Zinc gluconate  desyrel  miralax  Hydrodiuril  Valium  Keflex  lipitor  Aspirin 81 mg    Past Medical History:    Herpes simplex   Lumbago  Meniere's disease  Ovarian failure   Sensorineural hearing loss  Hypercholesterolemia   Vitamin D deficiency  Memory problem  Vitamin B12 deficiency   Hypertension   Osteoporosis     Past Surgical History:    Breast biopsy  Vaginal hysterectomy  Appendectomy  Removal of growth on heart  Vein stripping   Bladder suspension     Family History:    family history includes Blood Disease in her maternal grandfather; C.A.D. in her brother, father, and mother; Cancer in her mother; Cerebrovascular Disease in her maternal grandmother; Connective Tissue  Disorder in her daughter and daughter; Diabetes in her maternal aunt and maternal aunt; Neurologic Disorder in her father; Obesity in her brother; Prostate Cancer in her father.    Social History:   reports that she quit smoking about 62 years ago. Her smoking use included cigarettes. She has a 39.00 pack-year smoking history. She has never used smokeless tobacco. She reports that she does not drink alcohol and does not use drugs.  PCP: Alison Hernandez   Presents with .     Physical Exam     Patient Vitals for the past 24 hrs:   BP Temp Temp src Pulse Resp SpO2   04/15/23 2002 133/55 98.1  F (36.7  C) Oral 88 16 97 %        Physical Exam  Eyes:               Sclera white; Pupils are equal and round  ENT:                External ears and nares normal  CV:                  Rate as above with regular rhythm   Resp:               Breath sounds clear and equal bilaterally                          Non-labored, no retractions or accessory muscle use  MS:                  Moves all extremities  Skin:                Warm and dry  Neuro:             Speech is normal and fluent. No apparent deficit.    Emergency Department Course     Imaging:  XR Chest 2 Views   Final Result   IMPRESSION: Hyperinflation changes. Linear atelectasis or scarring right lung base. Scattered interstitial prominence in the perihilar and infrahilar regions likely representing chronic lung markings. Heart size and pulmonary vascularity within normal    limits. Osseous structures grossly intact. Visualized upper abdomen unremarkable.         Report per radiology    Laboratory:  Labs Ordered and Resulted from Time of ED Arrival to Time of ED Departure   BASIC METABOLIC PANEL - Abnormal       Result Value    Sodium 140      Potassium 3.6      Chloride 101      Carbon Dioxide (CO2) 30 (*)     Anion Gap 9      Urea Nitrogen 25.4 (*)     Creatinine 0.69      Calcium 9.8      Glucose 109 (*)     GFR Estimate 85     INFLUENZA A/B, RSV, & SARS-COV2  PCR - Normal    Influenza A PCR Negative      Influenza B PCR Negative      RSV PCR Negative      SARS CoV2 PCR Negative     CBC WITH PLATELETS AND DIFFERENTIAL    WBC Count 9.0      RBC Count 4.50      Hemoglobin 14.0      Hematocrit 43.0      MCV 96      MCH 31.1      MCHC 32.6      RDW 12.5      Platelet Count 218      % Neutrophils 68      % Lymphocytes 19      % Monocytes 11      % Eosinophils 2      % Basophils 0      % Immature Granulocytes 0      NRBCs per 100 WBC 0      Absolute Neutrophils 6.1      Absolute Lymphocytes 1.7      Absolute Monocytes 1.0      Absolute Eosinophils 0.2      Absolute Basophils 0.0      Absolute Immature Granulocytes 0.0      Absolute NRBCs 0.0          Procedures   NA    Emergency Department Course & Assessments:             Interventions:  Medications   amoxicillin-clavulanate (AUGMENTIN) 875-125 MG per tablet 1 tablet (1 tablet Oral $Given 4/15/23 3012)        Assessments:  2100 I obtained history and examined the patient as noted above.     Independent Interpretation (X-rays, CTs, rhythm strip):  Reviewed, agree no pneumonia      Impression & Plan      Medical Decision Making:  History and exam are consistent with sinusitis and productive cough.  Chest x-ray shows no evidence of associated pneumonia.  She is not septic.  For most cases of sinusitis antibiotics can be safely deferred for 7 to 10 days as there are multiple potential triggers including environmental, viral, and bacterial.  However she has already initiated antibiotics and is not on one that would currently be considered standard therapy.  This could risk resistance or failure to treat.  This was discussed with her.  She intends to continue on antibiotics and I think it would be clinically more appropriate to have her on 1 that is indicated for sinusitis.  Augmentin prescribed.  I discussed with her that if the trigger is not bacterial then the antibiotics will not help.  OTC medications for symptom relief also  discussed.    Diagnosis:    ICD-10-CM    1. Subacute maxillary sinusitis  J01.00       2. Productive cough  R05.8            Discharge Medications:  Discharge Medication List as of 4/15/2023 10:47 PM      START taking these medications    Details   amoxicillin-clavulanate (AUGMENTIN) 875-125 MG tablet Take 1 tablet by mouth 2 times daily for 7 days, Disp-13 tablet, R-0, [id number] dose in ED            Scribe Disclosure:  I, Devon Dennis, am serving as a scribe at 9:22 PM on 4/15/2023 to document services personally performed by Anna Vega MD based on my observations and the provider's statements to me.   4/15/2023   Anna Vega MD Gosen, Christine Leigh, MD  04/16/23 5516

## 2023-04-16 NOTE — ED TRIAGE NOTES
Pt has been having cough with yellow phlegm for 5-6 days, along with some congestion in head. Pt has been taking a home medication amoxicillin for almost 3 days. Denies fever, denies SOB, denies pain. Pts spouse states she has been sleeping more in the last few days.

## 2023-04-16 NOTE — DISCHARGE INSTRUCTIONS
For your symptoms you can also try the following which are available over the counter:  - nasal saline spray  - Mucinex

## 2023-04-29 ENCOUNTER — APPOINTMENT (OUTPATIENT)
Dept: CT IMAGING | Facility: CLINIC | Age: 83
End: 2023-04-29
Attending: EMERGENCY MEDICINE
Payer: MEDICARE

## 2023-04-29 ENCOUNTER — HOSPITAL ENCOUNTER (OUTPATIENT)
Facility: CLINIC | Age: 83
Setting detail: OBSERVATION
Discharge: HOME | End: 2023-05-01
Attending: EMERGENCY MEDICINE | Admitting: INTERNAL MEDICINE
Payer: MEDICARE

## 2023-04-29 DIAGNOSIS — R04.2 HEMOPTYSIS: ICD-10-CM

## 2023-04-29 DIAGNOSIS — R79.89 ELEVATED TROPONIN: ICD-10-CM

## 2023-04-29 DIAGNOSIS — J20.9 ACUTE BRONCHITIS, UNSPECIFIED ORGANISM: ICD-10-CM

## 2023-04-29 LAB
ALBUMIN SERPL BCG-MCNC: 4.4 G/DL (ref 3.5–5.2)
ALP SERPL-CCNC: 49 U/L (ref 35–104)
ALT SERPL W P-5'-P-CCNC: 42 U/L (ref 10–35)
ANION GAP SERPL CALCULATED.3IONS-SCNC: 12 MMOL/L (ref 7–15)
AST SERPL W P-5'-P-CCNC: 61 U/L (ref 10–35)
ATRIAL RATE - MUSE: 75 BPM
BASOPHILS # BLD AUTO: 0.1 10E3/UL (ref 0–0.2)
BASOPHILS NFR BLD AUTO: 1 %
BILIRUB SERPL-MCNC: 0.6 MG/DL
BUN SERPL-MCNC: 23.8 MG/DL (ref 8–23)
CALCIUM SERPL-MCNC: 10.1 MG/DL (ref 8.8–10.2)
CHLORIDE SERPL-SCNC: 99 MMOL/L (ref 98–107)
CREAT SERPL-MCNC: 0.55 MG/DL (ref 0.51–0.95)
D DIMER PPP FEU-MCNC: 1.18 UG/ML FEU (ref 0–0.5)
DEPRECATED HCO3 PLAS-SCNC: 27 MMOL/L (ref 22–29)
DIASTOLIC BLOOD PRESSURE - MUSE: NORMAL MMHG
EOSINOPHIL # BLD AUTO: 0.2 10E3/UL (ref 0–0.7)
EOSINOPHIL NFR BLD AUTO: 2 %
ERYTHROCYTE [DISTWIDTH] IN BLOOD BY AUTOMATED COUNT: 12.4 % (ref 10–15)
FLUAV RNA SPEC QL NAA+PROBE: NEGATIVE
FLUBV RNA RESP QL NAA+PROBE: NEGATIVE
GFR SERPL CREATININE-BSD FRML MDRD: 90 ML/MIN/1.73M2
GLUCOSE SERPL-MCNC: 116 MG/DL (ref 70–99)
HCT VFR BLD AUTO: 45.1 % (ref 35–47)
HGB BLD-MCNC: 14.9 G/DL (ref 11.7–15.7)
HOLD SPECIMEN: NORMAL
IMM GRANULOCYTES # BLD: 0 10E3/UL
IMM GRANULOCYTES NFR BLD: 0 %
INTERPRETATION ECG - MUSE: NORMAL
LYMPHOCYTES # BLD AUTO: 2.3 10E3/UL (ref 0.8–5.3)
LYMPHOCYTES NFR BLD AUTO: 34 %
MCH RBC QN AUTO: 31.2 PG (ref 26.5–33)
MCHC RBC AUTO-ENTMCNC: 33 G/DL (ref 31.5–36.5)
MCV RBC AUTO: 95 FL (ref 78–100)
MONOCYTES # BLD AUTO: 0.6 10E3/UL (ref 0–1.3)
MONOCYTES NFR BLD AUTO: 8 %
NEUTROPHILS # BLD AUTO: 3.7 10E3/UL (ref 1.6–8.3)
NEUTROPHILS NFR BLD AUTO: 55 %
NRBC # BLD AUTO: 0 10E3/UL
NRBC BLD AUTO-RTO: 0 /100
P AXIS - MUSE: 83 DEGREES
PLATELET # BLD AUTO: 169 10E3/UL (ref 150–450)
POTASSIUM SERPL-SCNC: 4.2 MMOL/L (ref 3.4–5.3)
PR INTERVAL - MUSE: 158 MS
PROCALCITONIN SERPL IA-MCNC: 0.05 NG/ML
PROT SERPL-MCNC: 7.2 G/DL (ref 6.4–8.3)
QRS DURATION - MUSE: 70 MS
QT - MUSE: 368 MS
QTC - MUSE: 410 MS
R AXIS - MUSE: 68 DEGREES
RBC # BLD AUTO: 4.77 10E6/UL (ref 3.8–5.2)
RSV RNA SPEC NAA+PROBE: NEGATIVE
SARS-COV-2 RNA RESP QL NAA+PROBE: NEGATIVE
SODIUM SERPL-SCNC: 138 MMOL/L (ref 136–145)
SYSTOLIC BLOOD PRESSURE - MUSE: NORMAL MMHG
T AXIS - MUSE: 73 DEGREES
TROPONIN T SERPL HS-MCNC: 324 NG/L
TROPONIN T SERPL HS-MCNC: 412 NG/L
VENTRICULAR RATE- MUSE: 75 BPM
WBC # BLD AUTO: 6.8 10E3/UL (ref 4–11)

## 2023-04-29 PROCEDURE — 84145 PROCALCITONIN (PCT): CPT | Performed by: EMERGENCY MEDICINE

## 2023-04-29 PROCEDURE — 99223 1ST HOSP IP/OBS HIGH 75: CPT | Mod: AI | Performed by: INTERNAL MEDICINE

## 2023-04-29 PROCEDURE — 87040 BLOOD CULTURE FOR BACTERIA: CPT | Performed by: EMERGENCY MEDICINE

## 2023-04-29 PROCEDURE — 96375 TX/PRO/DX INJ NEW DRUG ADDON: CPT

## 2023-04-29 PROCEDURE — 85379 FIBRIN DEGRADATION QUANT: CPT | Performed by: EMERGENCY MEDICINE

## 2023-04-29 PROCEDURE — 84484 ASSAY OF TROPONIN QUANT: CPT | Performed by: EMERGENCY MEDICINE

## 2023-04-29 PROCEDURE — C9803 HOPD COVID-19 SPEC COLLECT: HCPCS

## 2023-04-29 PROCEDURE — 96365 THER/PROPH/DIAG IV INF INIT: CPT

## 2023-04-29 PROCEDURE — 96367 TX/PROPH/DG ADDL SEQ IV INF: CPT

## 2023-04-29 PROCEDURE — G1010 CDSM STANSON: HCPCS

## 2023-04-29 PROCEDURE — 250N000009 HC RX 250: Performed by: EMERGENCY MEDICINE

## 2023-04-29 PROCEDURE — 87637 SARSCOV2&INF A&B&RSV AMP PRB: CPT | Performed by: EMERGENCY MEDICINE

## 2023-04-29 PROCEDURE — 85025 COMPLETE CBC W/AUTO DIFF WBC: CPT | Performed by: EMERGENCY MEDICINE

## 2023-04-29 PROCEDURE — 93005 ELECTROCARDIOGRAM TRACING: CPT

## 2023-04-29 PROCEDURE — 93005 ELECTROCARDIOGRAM TRACING: CPT | Mod: 76

## 2023-04-29 PROCEDURE — G0378 HOSPITAL OBSERVATION PER HR: HCPCS

## 2023-04-29 PROCEDURE — 250N000011 HC RX IP 250 OP 636: Performed by: EMERGENCY MEDICINE

## 2023-04-29 PROCEDURE — 99285 EMERGENCY DEPT VISIT HI MDM: CPT | Mod: CS,25

## 2023-04-29 PROCEDURE — 36415 COLL VENOUS BLD VENIPUNCTURE: CPT | Performed by: EMERGENCY MEDICINE

## 2023-04-29 PROCEDURE — 96374 THER/PROPH/DIAG INJ IV PUSH: CPT | Mod: 59

## 2023-04-29 PROCEDURE — 80053 COMPREHEN METABOLIC PANEL: CPT | Performed by: EMERGENCY MEDICINE

## 2023-04-29 PROCEDURE — 94640 AIRWAY INHALATION TREATMENT: CPT

## 2023-04-29 RX ORDER — METHYLPREDNISOLONE SODIUM SUCCINATE 125 MG/2ML
60 INJECTION, POWDER, LYOPHILIZED, FOR SOLUTION INTRAMUSCULAR; INTRAVENOUS ONCE
Status: COMPLETED | OUTPATIENT
Start: 2023-04-29 | End: 2023-04-29

## 2023-04-29 RX ORDER — GLUCOSAMINE/CHONDR SU A SOD 750-600 MG
1 TABLET ORAL DAILY
COMMUNITY

## 2023-04-29 RX ORDER — CEFTRIAXONE 2 G/1
2 INJECTION, POWDER, FOR SOLUTION INTRAMUSCULAR; INTRAVENOUS ONCE
Status: COMPLETED | OUTPATIENT
Start: 2023-04-29 | End: 2023-04-29

## 2023-04-29 RX ORDER — ATORVASTATIN CALCIUM 10 MG/1
1 TABLET, FILM COATED ORAL DAILY
Status: ON HOLD | COMMUNITY
Start: 2023-01-02 | End: 2023-04-30

## 2023-04-29 RX ORDER — IOPAMIDOL 755 MG/ML
54 INJECTION, SOLUTION INTRAVASCULAR ONCE
Status: COMPLETED | OUTPATIENT
Start: 2023-04-29 | End: 2023-04-29

## 2023-04-29 RX ORDER — CARBOXYMETHYLCELLULOSE SODIUM 5 MG/ML
1 SOLUTION/ DROPS OPHTHALMIC
Status: ON HOLD | COMMUNITY
End: 2023-04-30

## 2023-04-29 RX ORDER — IPRATROPIUM BROMIDE AND ALBUTEROL SULFATE 2.5; .5 MG/3ML; MG/3ML
3 SOLUTION RESPIRATORY (INHALATION) ONCE
Status: COMPLETED | OUTPATIENT
Start: 2023-04-29 | End: 2023-04-29

## 2023-04-29 RX ORDER — AZITHROMYCIN 500 MG/1
500 INJECTION, POWDER, LYOPHILIZED, FOR SOLUTION INTRAVENOUS ONCE
Status: COMPLETED | OUTPATIENT
Start: 2023-04-29 | End: 2023-04-30

## 2023-04-29 RX ORDER — ASPIRIN 81 MG/81MG
CAPSULE ORAL
Status: ON HOLD | COMMUNITY
End: 2023-04-30

## 2023-04-29 RX ORDER — DONEPEZIL HYDROCHLORIDE 5 MG/1
5 TABLET, FILM COATED ORAL AT BEDTIME
COMMUNITY
Start: 2023-04-20

## 2023-04-29 RX ORDER — BETAHISTINE HCL 100 %
8 POWDER (GRAM) MISCELLANEOUS 2 TIMES DAILY
Status: ON HOLD | COMMUNITY
Start: 2023-02-15 | End: 2023-05-01

## 2023-04-29 RX ORDER — DIPHENHYDRAMINE HYDROCHLORIDE 50 MG/ML
25 INJECTION INTRAMUSCULAR; INTRAVENOUS ONCE
Status: COMPLETED | OUTPATIENT
Start: 2023-04-29 | End: 2023-04-29

## 2023-04-29 RX ORDER — MELATONIN 10 MG
CAPSULE ORAL
Status: ON HOLD | COMMUNITY
End: 2023-04-30

## 2023-04-29 RX ORDER — IBUPROFEN 200 MG
950 CAPSULE ORAL
Status: ON HOLD | COMMUNITY
Start: 2022-10-06 | End: 2023-04-30

## 2023-04-29 RX ADMIN — AZITHROMYCIN MONOHYDRATE 500 MG: 500 INJECTION, POWDER, LYOPHILIZED, FOR SOLUTION INTRAVENOUS at 22:56

## 2023-04-29 RX ADMIN — IPRATROPIUM BROMIDE AND ALBUTEROL SULFATE 3 ML: .5; 3 SOLUTION RESPIRATORY (INHALATION) at 22:43

## 2023-04-29 RX ADMIN — CEFTRIAXONE SODIUM 2 G: 2 INJECTION, POWDER, FOR SOLUTION INTRAMUSCULAR; INTRAVENOUS at 22:24

## 2023-04-29 RX ADMIN — SODIUM CHLORIDE 81 ML: 9 INJECTION, SOLUTION INTRAVENOUS at 20:20

## 2023-04-29 RX ADMIN — METHYLPREDNISOLONE SODIUM SUCCINATE 62.5 MG: 125 INJECTION, POWDER, FOR SOLUTION INTRAMUSCULAR; INTRAVENOUS at 22:22

## 2023-04-29 RX ADMIN — DIPHENHYDRAMINE HYDROCHLORIDE 25 MG: 50 INJECTION, SOLUTION INTRAMUSCULAR; INTRAVENOUS at 19:51

## 2023-04-29 RX ADMIN — METHYLPREDNISOLONE SODIUM SUCCINATE 62.5 MG: 125 INJECTION, POWDER, FOR SOLUTION INTRAMUSCULAR; INTRAVENOUS at 19:52

## 2023-04-29 RX ADMIN — IOPAMIDOL 54 ML: 755 INJECTION, SOLUTION INTRAVENOUS at 20:20

## 2023-04-29 ASSESSMENT — ACTIVITIES OF DAILY LIVING (ADL)
ADLS_ACUITY_SCORE: 35
ADLS_ACUITY_SCORE: 35

## 2023-04-29 NOTE — ED TRIAGE NOTES
Patient comes in with her  for hemoptysis.  She started having a severe cough this afternoon and shortly after started having some blood in the sputum.  She has a frequent cough in triage.  She denies any other flu like symptoms. No fevers at home.  States this all developed this afternoon.

## 2023-04-30 ENCOUNTER — APPOINTMENT (OUTPATIENT)
Dept: CARDIOLOGY | Facility: CLINIC | Age: 83
End: 2023-04-30
Attending: INTERNAL MEDICINE
Payer: MEDICARE

## 2023-04-30 LAB
ANION GAP SERPL CALCULATED.3IONS-SCNC: 11 MMOL/L (ref 7–15)
BUN SERPL-MCNC: 18.9 MG/DL (ref 8–23)
CALCIUM SERPL-MCNC: 9.4 MG/DL (ref 8.8–10.2)
CHLORIDE SERPL-SCNC: 103 MMOL/L (ref 98–107)
CREAT SERPL-MCNC: 0.51 MG/DL (ref 0.51–0.95)
DEPRECATED HCO3 PLAS-SCNC: 26 MMOL/L (ref 22–29)
ERYTHROCYTE [DISTWIDTH] IN BLOOD BY AUTOMATED COUNT: 12.2 % (ref 10–15)
GFR SERPL CREATININE-BSD FRML MDRD: >90 ML/MIN/1.73M2
GLUCOSE SERPL-MCNC: 142 MG/DL (ref 70–99)
HCT VFR BLD AUTO: 40.5 % (ref 35–47)
HGB BLD-MCNC: 13.7 G/DL (ref 11.7–15.7)
LVEF ECHO: NORMAL
MCH RBC QN AUTO: 31.7 PG (ref 26.5–33)
MCHC RBC AUTO-ENTMCNC: 33.8 G/DL (ref 31.5–36.5)
MCV RBC AUTO: 94 FL (ref 78–100)
PLATELET # BLD AUTO: 175 10E3/UL (ref 150–450)
POTASSIUM SERPL-SCNC: 3.4 MMOL/L (ref 3.4–5.3)
RBC # BLD AUTO: 4.32 10E6/UL (ref 3.8–5.2)
SODIUM SERPL-SCNC: 140 MMOL/L (ref 136–145)
TROPONIN T SERPL HS-MCNC: 124 NG/L
WBC # BLD AUTO: 8.2 10E3/UL (ref 4–11)

## 2023-04-30 PROCEDURE — G0378 HOSPITAL OBSERVATION PER HR: HCPCS

## 2023-04-30 PROCEDURE — 250N000011 HC RX IP 250 OP 636: Performed by: INTERNAL MEDICINE

## 2023-04-30 PROCEDURE — 84484 ASSAY OF TROPONIN QUANT: CPT | Performed by: INTERNAL MEDICINE

## 2023-04-30 PROCEDURE — G0378 HOSPITAL OBSERVATION PER HR: HCPCS | Mod: CS

## 2023-04-30 PROCEDURE — 93306 TTE W/DOPPLER COMPLETE: CPT

## 2023-04-30 PROCEDURE — 36415 COLL VENOUS BLD VENIPUNCTURE: CPT | Performed by: INTERNAL MEDICINE

## 2023-04-30 PROCEDURE — 96366 THER/PROPH/DIAG IV INF ADDON: CPT

## 2023-04-30 PROCEDURE — 96376 TX/PRO/DX INJ SAME DRUG ADON: CPT

## 2023-04-30 PROCEDURE — 250N000013 HC RX MED GY IP 250 OP 250 PS 637: Performed by: INTERNAL MEDICINE

## 2023-04-30 PROCEDURE — 80048 BASIC METABOLIC PNL TOTAL CA: CPT | Performed by: INTERNAL MEDICINE

## 2023-04-30 PROCEDURE — 85027 COMPLETE CBC AUTOMATED: CPT | Performed by: INTERNAL MEDICINE

## 2023-04-30 PROCEDURE — 250N000012 HC RX MED GY IP 250 OP 636 PS 637: Performed by: INTERNAL MEDICINE

## 2023-04-30 PROCEDURE — 93306 TTE W/DOPPLER COMPLETE: CPT | Mod: 26 | Performed by: INTERNAL MEDICINE

## 2023-04-30 RX ORDER — POLYETHYLENE GLYCOL 3350 17 G/17G
17 POWDER, FOR SOLUTION ORAL DAILY PRN
Status: DISCONTINUED | OUTPATIENT
Start: 2023-04-30 | End: 2023-05-01 | Stop reason: HOSPADM

## 2023-04-30 RX ORDER — BISACODYL 10 MG
10 SUPPOSITORY, RECTAL RECTAL DAILY PRN
Status: DISCONTINUED | OUTPATIENT
Start: 2023-04-30 | End: 2023-05-01 | Stop reason: HOSPADM

## 2023-04-30 RX ORDER — AZITHROMYCIN 250 MG/1
250 TABLET, FILM COATED ORAL DAILY
Status: DISCONTINUED | OUTPATIENT
Start: 2023-04-30 | End: 2023-05-01 | Stop reason: HOSPADM

## 2023-04-30 RX ORDER — ACETAMINOPHEN 650 MG/1
650 SUPPOSITORY RECTAL EVERY 6 HOURS PRN
Status: DISCONTINUED | OUTPATIENT
Start: 2023-04-30 | End: 2023-05-01 | Stop reason: HOSPADM

## 2023-04-30 RX ORDER — ONDANSETRON 2 MG/ML
4 INJECTION INTRAMUSCULAR; INTRAVENOUS EVERY 6 HOURS PRN
Status: DISCONTINUED | OUTPATIENT
Start: 2023-04-30 | End: 2023-05-01 | Stop reason: HOSPADM

## 2023-04-30 RX ORDER — PROCHLORPERAZINE MALEATE 5 MG
5 TABLET ORAL EVERY 6 HOURS PRN
Status: DISCONTINUED | OUTPATIENT
Start: 2023-04-30 | End: 2023-05-01 | Stop reason: HOSPADM

## 2023-04-30 RX ORDER — CEFTRIAXONE 1 G/1
1 INJECTION, POWDER, FOR SOLUTION INTRAMUSCULAR; INTRAVENOUS EVERY 24 HOURS
Status: DISCONTINUED | OUTPATIENT
Start: 2023-04-30 | End: 2023-05-01 | Stop reason: HOSPADM

## 2023-04-30 RX ORDER — PROCHLORPERAZINE 25 MG
12.5 SUPPOSITORY, RECTAL RECTAL EVERY 12 HOURS PRN
Status: DISCONTINUED | OUTPATIENT
Start: 2023-04-30 | End: 2023-05-01 | Stop reason: HOSPADM

## 2023-04-30 RX ORDER — ONDANSETRON 4 MG/1
4 TABLET, ORALLY DISINTEGRATING ORAL EVERY 6 HOURS PRN
Status: DISCONTINUED | OUTPATIENT
Start: 2023-04-30 | End: 2023-05-01 | Stop reason: HOSPADM

## 2023-04-30 RX ORDER — POTASSIUM CITRATE 10 MEQ/1
10 TABLET, EXTENDED RELEASE ORAL
COMMUNITY

## 2023-04-30 RX ORDER — ACETAMINOPHEN 325 MG/1
650 TABLET ORAL EVERY 6 HOURS PRN
Status: DISCONTINUED | OUTPATIENT
Start: 2023-04-30 | End: 2023-05-01 | Stop reason: HOSPADM

## 2023-04-30 RX ORDER — PREDNISONE 20 MG/1
40 TABLET ORAL DAILY
Status: DISCONTINUED | OUTPATIENT
Start: 2023-04-30 | End: 2023-05-01 | Stop reason: HOSPADM

## 2023-04-30 RX ADMIN — MELATONIN TAB 3 MG 10 MG: 3 TAB at 01:47

## 2023-04-30 RX ADMIN — AZITHROMYCIN MONOHYDRATE 250 MG: 250 TABLET ORAL at 20:46

## 2023-04-30 RX ADMIN — PREDNISONE 40 MG: 20 TABLET ORAL at 08:01

## 2023-04-30 RX ADMIN — MELATONIN TAB 3 MG 10 MG: 3 TAB at 23:58

## 2023-04-30 RX ADMIN — CEFTRIAXONE SODIUM 1 G: 1 INJECTION, POWDER, FOR SOLUTION INTRAMUSCULAR; INTRAVENOUS at 21:05

## 2023-04-30 ASSESSMENT — ACTIVITIES OF DAILY LIVING (ADL)
ADLS_ACUITY_SCORE: 35
ADLS_ACUITY_SCORE: 33
ADLS_ACUITY_SCORE: 35
ADLS_ACUITY_SCORE: 33
ADLS_ACUITY_SCORE: 33
ADLS_ACUITY_SCORE: 35
ADLS_ACUITY_SCORE: 35
ADLS_ACUITY_SCORE: 33
ADLS_ACUITY_SCORE: 35
ADLS_ACUITY_SCORE: 35

## 2023-04-30 NOTE — PLAN OF CARE
Shift: [id number]  Orientation/Cognitive: AOx4  Observation Goals (Met/ Not Met): partially met, see notes below.  Mobility Level/Assist Equipment: SBA  Fall Risk (Y/N): yes  Behavior Concerns: calm and cooperative  Pain Management: denies pain  Tele/VS/O2: VS stable and on room air. NSR  ABNL Lab/BG:  Trop 124 (trending down) Blood culture - in process.  Diet: regular  Bowel/Bladder: continent BB  Skin Concerns: BUE bruising forearms  Drains/Devices: PIV SL  Tests/Procedures for next shift:   Anticipated DC date & active delays: TBD        Observation Goals:     -diagnostic tests and consults completed and resulted -partially met     -vital signs normal or at patient baseline - VSS on RA     -safe disposition plan has been identified - not yet.     -Nurse to notify provider when observation goals have been met and patient is ready for discharge. - not yet.

## 2023-04-30 NOTE — ED PROVIDER NOTES
History     Chief Complaint:  Hemoptysis       The history is provided by the patient and the spouse.      Carmen Zhang is a 82 year old female with a history of Meniere's disease, hyperlipidemia, hypertension, among others, who presents with hemoptysis that began earlier this afternoon. The patient's  reports that her cough has been intermittent throughout the day and became more frequent over the last hour. She describes an episode of bright red bloody sputum measuring roughly 2 tablespoons. No recent illness, fever, chills, lung problems, blood clots, generalized myalgias, recent trips, history of heart issues, or history of lung cancer. She also notes that she has not been around anyone else who is sick. The patient reports a vein stripping surgery a couple years back. Carmen Davila states that in her 30s, her PCP thought that she had lung cancer, but they only noticed a benign growth attached to her heart. Outside of this, her  mentions that she started a new medication 3-4 days ago. Carmen Davila states that she has not smoked since she was 28 years old. She takes a daily Aspirin.     Independent Historian:       Review of External Notes: N/A    ROS:  Review of Systems  Negative besides those mentioned in the HPI.    Allergies:  Diatrizoate  Dye [Contrast Dye]  Iodine  Seasonal Allergies   Codeine    Medications:    Aspirin 81 mg  Lipitor  Keflex  CoQ 10  Valium  Hydrodiuril  Lidocare  Melatonin  Miralax  Desyrel  Urocit-K   Tocopherol  Zinc gluconate  Lutein-zeaxanthin   Citracal  Hydrochlorothiazide  Medrol Moses  Betahistine HCl  Robaxin  Aricept    Past Medical History:    Female stress incontinence   Herpes simplex  Lumbago   Meniere's disease   Ovarian failure   Nervous system and sense organs disorder  Hyperlipidemia  Osteoporosis  Constipation  Atrophic vaginitis  Hypertension   Vitamin B12 deficiency   Insomnia  Asymptomatic postmenopausal status  Memory problem  Vitamin D  deficiency  Memory loss or impairment  Benzodiazepine dependence  Right shoulder tendonitis  Biceps tendinitis of right upper extremity    Past Surgical History:    Breast biopsy, left  Inner ear surgery for Meniere's disease  ERNST and BSO  Appendectomy  Heart growth removal  Bladder suspension  Bladder repair  pericardial window lump removal  Vein stripping     Family History:    Mother: esophageal cancer, CAD, myocardial infarction  Father: CAD, myocardial infarction, prostate cancer, Parkinson's disease  Brother(s): CAD, obesity, Alzheimer's disease  Daughter(s): arthritis    Social History:  The patient presents to the ED with her  Sukhdeep.  PCP: Alison Hernandez     Physical Exam     Patient Vitals for the past 24 hrs:   BP Temp Temp src Pulse Resp SpO2   04/29/23 2145 (!) 142/72 -- -- 75 20 94 %   04/29/23 2100 -- -- -- -- -- 94 %   04/29/23 2000 -- -- -- -- -- 97 %   04/29/23 1930 -- -- -- -- -- 96 %   04/29/23 1925 (!) 149/71 -- -- 65 -- 96 %   04/29/23 1856 (!) 163/74 98  F (36.7  C) Oral 65 18 95 %      Physical Exam  GENERAL: well developed, pleasant  HEAD: atraumatic  EYES: pupils reactive, extraocular muscles intact, conjunctivae normal  ENT:  mucus membranes moist  NECK:  trachea midline, normal range of motion  RESPIRATORY: no tachypnea. Coarse breath sounds. Blood tinged sputum.  CVS: normal S1/S2, no murmurs, intact distal pulses  ABDOMEN: soft, nontender, nondistention  MUSCULOSKELETAL: no deformities  SKIN: warm and dry, no acute rashes or ulceration  NEURO: GCS 15, cranial nerves intact, alert and oriented x3  PSYCH:  Mood/affect normal    Emergency Department Course   ECG  ECG taken at 2150, ECG read at 2155  Normal sinus rhythm  Normal ECG   No significant changes as compared to prior, dated 06/27/2021  Rate 75 bpm. NY interval 158 ms. QRS duration 70 ms. QT/QTc 368/410 ms. P-R-T axes 83 68 73.     Imaging:  CT Chest Pulmonary Embolism w Contrast   Final Result   IMPRESSION:   1.  No  pulmonary artery embolism.   2.  Findings suggesting bronchiolitis including bronchial wall thickening and pulmonary air trapping. No pneumonic infiltrate or pleural effusion.   3.  Stable benign 14 mm lingular nodule, unchanged since 04/07/2019.         Report per radiology    Laboratory:  Labs Ordered and Resulted from Time of ED Arrival to Time of ED Departure   D DIMER QUANTITATIVE - Abnormal       Result Value    D-Dimer Quantitative 1.18 (*)    COMPREHENSIVE METABOLIC PANEL - Abnormal    Sodium 138      Potassium 4.2      Chloride 99      Carbon Dioxide (CO2) 27      Anion Gap 12      Urea Nitrogen 23.8 (*)     Creatinine 0.55      Calcium 10.1      Glucose 116 (*)     Alkaline Phosphatase 49      AST 61 (*)     ALT 42 (*)     Protein Total 7.2      Albumin 4.4      Bilirubin Total 0.6      GFR Estimate 90     TROPONIN T, HIGH SENSITIVITY - Abnormal    Troponin T, High Sensitivity 412 (*)    INFLUENZA A/B, RSV, & SARS-COV2 PCR - Normal    Influenza A PCR Negative      Influenza B PCR Negative      RSV PCR Negative      SARS CoV2 PCR Negative     CBC WITH PLATELETS AND DIFFERENTIAL    WBC Count 6.8      RBC Count 4.77      Hemoglobin 14.9      Hematocrit 45.1      MCV 95      MCH 31.2      MCHC 33.0      RDW 12.4      Platelet Count 169      % Neutrophils 55      % Lymphocytes 34      % Monocytes 8      % Eosinophils 2      % Basophils 1      % Immature Granulocytes 0      NRBCs per 100 WBC 0      Absolute Neutrophils 3.7      Absolute Lymphocytes 2.3      Absolute Monocytes 0.6      Absolute Eosinophils 0.2      Absolute Basophils 0.1      Absolute Immature Granulocytes 0.0      Absolute NRBCs 0.0     PROCALCITONIN   TROPONIN T, HIGH SENSITIVITY   BLOOD CULTURE   BLOOD CULTURE      Emergency Department Course & Assessments:       Interventions:  Medications   ipratropium - albuterol 0.5 mg/2.5 mg/3 mL (DUONEB) neb solution 3 mL (has no administration in time range)   cefTRIAXone (ROCEPHIN) 2 g vial to attach  to  ml bag for ADULTS or NS 50 ml bag for PEDS (2 g Intravenous $New Bag 4/29/23 2224)   azithromycin (ZITHROMAX) 500 mg vial to attach to  mL bag (has no administration in time range)   diphenhydrAMINE (BENADRYL) injection 25 mg (25 mg Intravenous $Given 4/29/23 1951)   methylPREDNISolone sodium succinate (solu-MEDROL) injection 62.5 mg (62.5 mg Intravenous $Given 4/29/23 1952)   Saline Flush (81 mLs Intravenous $Given 4/29/23 2020)   iopamidol (ISOVUE-370) solution 54 mL (54 mLs Intravenous $Given 4/29/23 2020)   methylPREDNISolone sodium succinate (solu-MEDROL) injection 62.5 mg (62.5 mg Intravenous $Given 4/29/23 2222)      Assessments:  1923 I obtained history and examined the patient as noted above.  2210 I rechecked the patient and explained findings.    Independent Interpretation (X-rays, CTs, rhythm strip):  None    Consultations/Discussion of Management or Tests:  2204 I spoke with Dr. Appiah from pulmonology regarding the patient's presentation and plan of care.  2230 I spoke with Dr. Bonilla from the hospitalist service regarding the patient's presentation, findings here in the ED, and plan of care.    Social Determinants of Health affecting care:   None    Disposition:  The patient was admitted to the hospital under the care of Dr. Bonilla.     Impression & Plan      Medical Decision Making:  Patient presents with fairly sudden onset cough and hemoptysis.  At times it is mainly blood-tinged sputum and other times is teaspoon or tablespoon.  She has no underlying lung problems.  She is here with her  from home.  She notes surgery when she was in her 30s that they thought was possibly a lung cancer but growth around her heart.  It was thought to be something she had from a young child.    Viral panel is negative.  White count is normal.  CT chest was obtained after giving Benadryl and Solu-Medrol given her history of contrast dye allergy.  She does not have any effects from the CT.   CT shows bronchiolitis or bronchitis with thickening of the right mainstem bronchus.  No infiltrate or mass or PE.  Patient's had persistent coughing with persistent bloody sputum.  Did reach out to pulmonary and suggested antibiotics and steroids.  Inhalers could be used if needed in terms of helping with the cough.  Patient's troponin came back elevated.  EKG shows no ischemic changes.  Patient has some diminished lung sounds with be getting a neb to see if that helps.  Spoke with the hospitalist regarding admission.    Diagnosis:    ICD-10-CM    1. Acute bronchitis, unspecified organism  J20.9       2. Hemoptysis  R04.2       3. Elevated troponin  R77.8          Scribe Disclosure:  I, Wilman Leon, am serving as a scribe at 8:22 PM on 4/29/2023 to document services personally performed by Hossein Guerin MD, based on my observations and the provider's statements to me.   4/29/2023   Hossein Guerin MD Adams, Shaun L, MD  04/29/23 5232

## 2023-04-30 NOTE — H&P
[facility]    History and Physical - Hospitalist Service       Date of Admission:  4/29/2023    Assessment & Plan   Carmen Zhang is a 82 year old female with past medical history including Ménière's disease mild cognitive impairment, hypertension, history of benign thoracic mass status post resection who presents with hemoptysis.  Admitted on 4/29/2023.     Bronchiolitis   Hemoptysis   *Presents with hemoptysis in setting of recent chest and sinus infection. May or may not have been on antibiotics recently (patient unsure, has [facility] as below)   *CT chest PE protocol with findings suggesting bronchiolitis including bronchial wall thickening and pulmonary air-trapping, negative for PE  *COVID19/Influenza/RSV PCR negative  *Procalcitonin 0.05, WBC normal   *Case discussed with pulmonology in ED, recommended steroids and antibiotics  *Currently on room air  - Pulmonology consulted  - Prednisone 40 mg daily  - Continue ceftriaxone IV, azithromycin PO pending pulmonology evaluation     Troponin elevation  Pericardial recess fluid collection  Hx of thoracic mass s/p resection   *Troponin checked in ED for unclear reasons, elevated at 412->324  *Has been having chest pain associated with coughing, no recent anginal-type symptoms, notes she is active walking up stairs frequently without chest pain or shortness of breath   *EKG non-ischemic  *Also incidentally noted to have pericardial recess fluid collection as above.  Patient reports history of possible lung cancer in her 30s, on thoracotomy was found to have a fist-sized mass attached to her heart which was resected and turned out benign.  Suspect fluid collection due to potential space left from this.  - Telemetry  - Echocardiogram     Hyperlipidemia  - Hold prior to admission statin while observation status    Hypertension  - Resume prior to admission hydrochlorothiazide when dose confirmed by pharmacy     Mild cognitive impairment  Follows  with Dr. Fish. Notes indicate issues with irritability, aggression towards family, social inappropriateness in addition to memory loss.   - Re-orient as needed  - Maintain normal day/night, sleep wake cycles  - Minimize sedating/altering medications as able  - Treat separate conditions as detailed above/below        Diet:   Regular diet  DVT Prophylaxis: Observation patient, short stay anticipated   Hoff Catheter: Not present  Code Status:   Full code    Disposition Plan   [place] to observation status. Anticipate discharge within 24 hours if clinically improved.     Entered: Brad Bonilla MD 04/30/2023, 12:32 AM     The patient's care was discussed with the ED provider, patient    Clinically Significant Risk Factors Present on Admission                                    Brad Bonilla MD  [facility]    ______________________________________________________________________    Chief Complaint   Coughing up blood    History of Present Illness   Carmen Zhang is a 82 year old female who presents with the above chief complaint.    History is obtained from the patient, discussion with ED provider and review of medical record. History from patient somewhat limited due to to her cognitive impairment.  She reports she has recently been dealing with a sinus and chest infection, initially coughing up yellow sputum.  She cannot say definitively when her symptoms started.  She may have been prescribed an antibiotic, but she is not sure by whom or when.  On the day of presentation, she reports trying to cough up some phlegm and noting a half dollar sized amount of rach red blood.  This prompted her presentation to the emergency department.  She reports she has had some chest discomfort associated with coughing since she has been ill, she otherwise is active walking up stairs frequently with no chest pain or shortness of breath with stairs.  She denies any prior history of heart disease or  MI.  She denies any fevers or chills.  She denies any known history of lung disease.  In her 30s she was found to have a possible lung cancer, however thoracotomy revealed a fist-sized mass attached to her heart which was resected and turned out to be benign.    In the Emergency Department, the patient was seen by Dr. Guerin, with whom I discussed the patient's presenting symptoms and emergency department course.  Initial vital signs were a temperature of 98F, HR 65, /74, RR 18, SpO2 95% on room air. Pertinent work-up as noted in A&P. The patient received IV azithromycin, IV ceftriaxone, IV diphenhydramine, IV steroids and Hospitalists were contacted for admission to the hospital.     Past Medical History    I have reviewed this patient's medical history and updated it with pertinent information if needed.   Past Medical History:   Diagnosis Date     Benign essential hypertension      Female stress incontinence      Herpes simplex without mention of complication      Hyperlipidemia      Insomnia      Lumbago 08/17/2006     Meniere's disease      Osteoporosis      Other ovarian failure      Personal history of other disorders of nervous system and sense organs      Sensorineural hearing loss, unilateral        Past Surgical History   I have reviewed this patient's surgical history and updated it with pertinent information if needed.  Past Surgical History:   Procedure Laterality Date     BREAST BIOPSY, RT/LT       HYSTERECTOMY, VAGINAL      ovaries left     ZZC APPENDECTOMY       ZZC NONSPECIFIC PROCEDURE  33 yrs old    removal of growth on heart      ZZC NONSPECIFIC PROCEDURE      bladder suspension     ZZ NONSPECIFIC PROCEDURE      vein stripping         Social History   I have reviewed this patient's social history and updated it with pertinent information if needed.  Social History     Tobacco Use     Smoking status: Former     Packs/day: 3.00     Years: 13.00     Pack years: 39.00     Types: Cigarettes      Quit date: 1961     Years since quittin.3     Smokeless tobacco: Never     Tobacco comments:     STARTED SMOKING AT AGE 8 - quit in    Substance Use Topics     Alcohol use: No     Alcohol/week: 0.0 standard drinks of alcohol     Drug use: No       Family History   I have reviewed this patient's family history and updated it with pertinent information if needed.   Family History   Problem Relation Age of Onset     Cancer Mother         ESOPHAGEAL, SMOKER     C.A.D. Mother         MI IN HER 70s     C.A.LILLY. Father         MI IN HIS 70s     Prostate Cancer Father      Neurologic Disorder Father         PARKINSON'S     Cerebrovascular Disease Maternal Grandmother      Blood Disease Maternal Grandfather      Diabetes Maternal Aunt      SAMUEL.A.LILLY. Brother         11 STENTS AND BYPASS, IN HIS FIFTIES     Obesity Brother      Connective Tissue Disorder Daughter         HIP PRESENTATION     Diabetes Maternal Aunt      Connective Tissue Disorder Daughter         ARTHRITIS        Prior to Admission Medications  - Pending pharmacy reconciliation   Prior to Admission Medications   Prescriptions Last Dose Informant Patient Reported? Taking?   ASPIRIN 81 MG OR TABS  Self Yes No   Sig: Take 81 mg by mouth daily    Aspirin (VAZALORE) 81 MG CAPS   Yes No   Betahistine HCl (BETAHISTINE DIHYDROCHLORIDE) POWD   Yes Yes   Si mg Twice Daily. Disp 3 months Supply   COQ10 100 MG OR CAPS  Self Yes No   Si teaspoonsful of Qunol-liquid daily.   Cyanocobalamin 1000 MCG CAPS   Yes No   Lidocaine (LIDOCARE) 4 % Patch   No No   Sig: Place 1 patch onto the skin every 24 hours To prevent lidocaine toxicity, patient should be patch free for 12 hrs daily.   Lutein-Zeaxanthin 25-5 MG CAPS   Yes No   Melatonin 10 MG CAPS   Yes No   Melatonin 10 MG TABS  Self Yes No   Sig: Take 10 mg by mouth nightly as needed    NONFORMULARY  Self Yes No   Sig: Take 1 tablet by mouth daily Lutein/zeaxanthin   Omega-3 Fatty Acids (FISH OIL) 1200 MG  capsule  Self Yes No   Sig: Take 1,200 mg by mouth daily   UROCIT-K 10 MEQ (1080 MG) CR tablet  Self No No   Sig: TAKE 1 TABLET THREE TIMES A DAY WITH MEALS FOR POTASSIUM SUPPLEMENTATION   Vitamin D3 (CHOLECALCIFEROL) 125 MCG (5000 UT) tablet  Self Yes No   Sig: Take 125 mcg by mouth daily   atorvastatin (LIPITOR) 10 MG tablet  Self No No   Sig: Take 1 tablet (10 mg) by mouth daily Pt is requesting that she only receive [employer].   atorvastatin (LIPITOR) 10 MG tablet   Yes Yes   Sig: Take 1 tablet by mouth daily   calcium citrate (CITRACAL) 950 (200 Ca) MG tablet   Yes Yes   Sig: Take 950 mg by mouth   calcium-vitamin D (CALCIUM 600 + D) 600-400 MG-UNIT per tablet  Self No No   Sig: Take 1 tablet by mouth 2 times daily FOR BONE HEALTH AND FOR VITAMIN D DEFICIENCY (LOW VITAMIN D)   carboxymethylcellulose (REFRESH PLUS) 0.5 % SOLN ophthalmic solution   Yes No   Sig: Apply 1 drop to eye   carboxymethylcellulose PF (REFRESH PLUS) 0.5 % ophthalmic solution  Self Yes No   Sig: Place 1 drop into both eyes 4 times daily as needed for dry eyes Uncertain of otc product   cephALEXin (KEFLEX) 500 MG capsule   No No   Sig: Take 1 capsule (500 mg) by mouth 3 times daily   cephALEXin (KEFLEX) 500 MG capsule   No No   Sig: Take 1 capsule (500 mg) by mouth 3 times daily   cyabnocobalamin (VITAMIN B-12) 2500 MCG sublingual tablet  Self No No   Sig: Take 2,500 mcg by mouth daily INDICATION: FOR VITAMIN B12 SUPPLEMENTATION - TO IMPROVE MEMORY, BALANCE, SLEEP AND MOOD, DIRECTIONS: PLEASE PLACE UNDER THE TONGUE TO DISSOLVE   diazepam (VALIUM) 2 MG tablet  Self No No   Sig: Take 1 tablet (2 mg) by mouth At Bedtime   donepezil (ARICEPT) 5 MG tablet   Yes No   hydrochlorothiazide (HYDRODIURIL) 25 MG tablet  Self Yes No   Sig: Take 25 mg by mouth daily   multivitamin w/minerals (THERA-VIT-M) tablet  Self Yes No   Sig: Take 1 tablet by mouth daily   mupirocin (BACTROBAN) 2 % external ointment   No No   Sig: Apply topically 3 times daily    polyethylene glycol (MIRALAX) powder  Self No No   Sig: Take 17 g (1 capful) by mouth daily INDICATION: CONSTIPATION, TO ACHIEVE 1-2 SOFT BMs PER DAY   traZODone (DESYREL) 50 MG tablet  Self No No   Sig: Take 1 tablet (50 mg) by mouth At Bedtime   vitamin E (TOCOPHEROL) 1000 units (450 mg) capsule  Self Yes No   Sig: Take 1,000 Units by mouth daily   zinc gluconate 50 MG tablet  Self Yes No   Sig: Take 50 mg by mouth daily Unsure of which zinc formulation      Facility-Administered Medications: None     Allergies   Allergies   Allergen Reactions     Diatrizoate Hives and Itching     Dye [Contrast Dye] Hives and Itching     Iodine Hives     Seasonal Allergies        Physical Exam   Vital Signs: Temp: 98  F (36.7  C) Temp src: Oral BP: (!) 144/75 Pulse: 75   Resp: 19 SpO2: 94 % O2 Device: None (Room air)    Weight: 0 lbs 0 oz    Constitutional: Well-appearing, NAD  Eyes: PERRL, EOMI  HENT: Oropharynx clear, MMM  Respiratory: Clear to auscultation bilaterally, good air movement, normal effort   Cardiovascular: RRR, no m/r/g. No peripheral edema.    GI: Soft, non-tender, non-distended. No rebound tenderness or guarding.    Skin: Warm, dry   Neurologic: Alert. Responding to questions appropriately. Following commands.  Poor recollection of dates of recent symptoms  Psychiatric: Normal affect, appropriate      Data   Data reviewed today: I reviewed all medications, new labs and imaging results over the last 24 hours. I personally reviewed the EKG tracing showing normal sinus rhythm .    Recent Labs   Lab 04/29/23  1904   WBC 6.8   HGB 14.9   MCV 95         POTASSIUM 4.2   CHLORIDE 99   CO2 27   BUN 23.8*   CR 0.55   ANIONGAP 12   FLAKO 10.1   *   ALBUMIN 4.4   PROTTOTAL 7.2   BILITOTAL 0.6   ALKPHOS 49   ALT 42*   AST 61*       Recent Results (from the past 24 hour(s))   CT Chest Pulmonary Embolism w Contrast    Narrative    EXAM: CT CHEST PULMONARY EMBOLISM W CONTRAST  LOCATION: [facility]  DATE/TIME: 4/29/2023 8:51 PM CDT    INDICATION: hemoptysis  COMPARISON: CT chest 04/07/2019  TECHNIQUE: CT chest pulmonary angiogram during arterial phase injection of IV contrast. Multiplanar reformats and MIP reconstructions were performed. Dose reduction techniques were used.   CONTRAST: 54mL Isovue 370    FINDINGS:  ANGIOGRAM CHEST: Pulmonary arteries are normal caliber and negative for pulmonary emboli. Thoracic aorta is negative for dissection. No CT evidence of right heart strain.    LUNGS AND PLEURA: Mild layering low-density fluid/secretions within the right mainstem bronchus. Mild bronchial wall thickening. Multifocal parenchymal scarring. No focal consolidation or pleural effusion. Mosaic lung attenuation suggesting air trapping.   Stable benign 14 mm lingular subpleural nodule, unchanged since 04/07/2019.    MEDIASTINUM/AXILLAE: Prominent pericardial recess fluid extending around the ascending thoracic aorta. No thoracic adenopathy. Borderline cardiomegaly. No pericardial effusion.    CORONARY ARTERY CALCIFICATION: Minimal    UPPER ABDOMEN: Partially visualized left renal cysts. No follow-up is indicated.    MUSCULOSKELETAL: Normal.      Impression    IMPRESSION:  1.  No pulmonary artery embolism.  2.  Findings suggesting bronchiolitis including bronchial wall thickening and pulmonary air trapping. No pneumonic infiltrate or pleural effusion.  3.  Stable benign 14 mm lingular nodule, unchanged since 04/07/2019.

## 2023-04-30 NOTE — CONSULTS
"Pulmonology Consultation      Carmen Zhang MRN# [id number]   YOB: 1940 Age: 82 year old   Date of Admission: 4/29/2023     Reason for consult: I was asked by Dr Bonilla to evaluate this patient for hemoptysis.           Assessment and Plan:   Hemoptysis, minor  Bronchiolitis and air trapping on CT  Benign lung nodule  Benign pericardial fluid collection  2-3 weeks of \"congestion\" malaise, sinus sx; partial response to augmentin at best.     Also:   Troponin bump  Malnutrition with fat deficient diet for years, taking multiple vitamin supplements  Self treating with ASA 81 for years  Easy bruisability  Elevated INR  Elevated LFTS  FHX of CAD in brother      Self limited hemoptysis with bronchiolitis, neg viral panel but onset was 2-3 weeks ago for the \"congestion\".   High probability problem will remain self limited and resolve with antibiotics (different one, zithro for example, on discharge) and prednisone 40 mg burst and taper off by dropping 10 mg every 3rd day until off. If active hemoptysis recurs, would consider airway exam with special attention to R side.  has our card.   Repeat CT in 2 weeks  Clinic appt in [place] in 3 weeks.     Consider further eval of other findings, skyler elevated INR and liver enzymes, bump in troponin. I told her to stop the ASA but if the troponin bump is indicative of heart injury, may not want to do that. She was not hypoxemic. Unclear why it bumped--perhaps that was why she was \"tired\" for the last week? Also, vitamin E can be toxic and they don't know how much she was taking when it was recently suggested to her to take \"pure E\".     Observe overnight. Consider discharge tomorrow if no fresh active bleeding. Would expect some dark red or old clots in the next couple of days skyler after she gets out of bed and becomes more active.     PLEASE BOOK A CT SCAN for 2 weeks post discharge, no contrast, high resolution cuts, out patient.     Thank you for asking us " "to see her. I will ask my office to assist her in booking an appt.in our [facility] pffice for after the ct.       Joshua  [phone]  [facility]                 Chief Complaint:   Coughing up blood yesterday    History is obtained from the patient and electronic health record and ED physician and  and daughter         History of Present Illness:   This patient is a 82 year old female who presents with 2-3 weeks of \"congestion\" in sinuses and chest; she was seen in ED and given augmentin and finished 7 days of it a week ago Friday. Her  thought she was better but not back to normal as she complained of fatigue and congestion in sinuses and chest still. He says she did not really have a cough until yesterday when over the course of about an hour she coughed up 2-3T of BRB. They came to the ED and while there also coughed up another 2-3 T of BRB. Her CT was abnormal and her troponin was elevated. She was admitted with iv antibiotics and we are consulted regarding the hemoptysis. She is an unreliable short term historian but fine on long term memory and reports she has recently been advised she was low on Vitamin E and told to take \"pure E\" and high on B12 and told to cut back to three times per week. She has always been extremely careful to eat NO FAT in her diet due to a strong family hx of CAD (brother) and her  points this out as family wonders if diet has anything to do with her easy bruisability. She has been taking ASA 81 for years for prevention of CAD events. LFTS are elevated, troponin bumped yesterday INR is elevated at 1.18. She has multiple bruises at iv attempt sites on her arms today. CT disclosed air trapping and bronchiolitis and R mainstem fluid density yesterday. Lung fields are hyperinflated.   She has previously had evaluation for her mild cognitive dysfunction.   She smoked up to 3 ppd for 13 y. = 39 pack years, ending 1961  In her youth she had surgery for what sounds like " a pericardial cyst; it was benign.  Small lung nodule on CT stable since 2019. CT showed bronchiolitis, hyperinflation and some fluid in pericardium.               Past Medical History:     Past Medical History:   Diagnosis Date     Benign essential hypertension      Female stress incontinence      Herpes simplex without mention of complication      Hyperlipidemia      Insomnia      Lumbago 2006     MCI (mild cognitive impairment)      Meniere's disease      Osteoporosis      Other ovarian failure      Personal history of other disorders of nervous system and sense organs      Sensorineural hearing loss, unilateral              Past Surgical History:     Past Surgical History:   Procedure Laterality Date     BREAST BIOPSY, RT/LT       HYSTERECTOMY, VAGINAL      ovaries left     ZC APPENDECTOMY       Z NONSPECIFIC PROCEDURE  33 yrs old    removal of growth on heart      Z NONSPECIFIC PROCEDURE      bladder suspension     [facility] NONSPECIFIC PROCEDURE      vein stripping               Social History:     Social History     Tobacco Use     Smoking status: Former     Packs/day: 3.00     Years: 13.00     Pack years: 39.00     Types: Cigarettes     Quit date: 1961     Years since quittin.3     Smokeless tobacco: Never     Tobacco comments:     STARTED SMOKING AT AGE 8 - quit in    Vaping Use     Vaping status: Not on file   Substance Use Topics     Alcohol use: No     Alcohol/week: 0.0 standard drinks of alcohol             Family History:     Family History   Problem Relation Age of Onset     Cancer Mother         ESOPHAGEAL, SMOKER     MINH Mother         MI IN HER 70s     MINH Father         MI IN HIS 70s     Prostate Cancer Father      Neurologic Disorder Father         PARKINSON'S     Cerebrovascular Disease Maternal Grandmother      Blood Disease Maternal Grandfather      Diabetes Maternal Aunt      RIZWANA. Brother         11 STENTS AND BYPASS, IN HIS FIFTIES     Obesity Brother       Connective Tissue Disorder Daughter         HIP PRESENTATION     Diabetes Maternal Aunt      Connective Tissue Disorder Daughter         ARTHRITIS             Immunizations:     Immunization History   Administered Date(s) Administered     COVID-19 Vaccine 18+ (Moderna) 04/07/2021, 05/05/2021     Influenza (High Dose) 3 valent vaccine 09/19/2013, 08/01/2015     Pneumo Conj 13-V (2010&after) 02/16/2016     Pneumococcal 23 valent 11/30/2004     TD,PF 7+ (Tenivac) 10/10/2005     TDAP Vaccine (Adacel) 02/16/2016     Zoster vaccine, live 10/16/2009             Allergies:     Allergies   Allergen Reactions     Diatrizoate Hives and Itching     Dye [Contrast Dye] Hives and Itching     Iodine Hives     Seasonal Allergies              Medications:     Current Facility-Administered Medications Ordered in Epic   Medication Dose Route Frequency Last Rate Last Admin     acetaminophen (TYLENOL) tablet 650 mg  650 mg Oral Q6H PRN        Or     acetaminophen (TYLENOL) Suppository 650 mg  650 mg Rectal Q6H PRN         azithromycin (ZITHROMAX) tablet 250 mg  250 mg Oral Daily         bisacodyl (DULCOLAX) suppository 10 mg  10 mg Rectal Daily PRN         cefTRIAXone (ROCEPHIN) 1 g vial to attach to  mL bag for ADULTS or NS 50 mL bag for PEDS  1 g Intravenous Q24H         melatonin tablet 10 mg  10 mg Oral At Bedtime PRN   10 mg at 04/30/23 0147     ondansetron (ZOFRAN ODT) ODT tab 4 mg  4 mg Oral Q6H PRN        Or     ondansetron (ZOFRAN) injection 4 mg  4 mg Intravenous Q6H PRN         polyethylene glycol (MIRALAX) Packet 17 g  17 g Oral Daily PRN         predniSONE (DELTASONE) tablet 40 mg  40 mg Oral Daily   40 mg at 04/30/23 0801     prochlorperazine (COMPAZINE) injection 5 mg  5 mg Intravenous Q6H PRN        Or     prochlorperazine (COMPAZINE) tablet 5 mg  5 mg Oral Q6H PRN        Or     prochlorperazine (COMPAZINE) suppository 12.5 mg  12.5 mg Rectal Q12H PRN         No current [facility]-ordered outpatient medications on file.              Review of Systems:   The 5 point Review of Systems is negative other than noted in the HPI            Physical Exam:     Vitals were reviewed  Patient Vitals for the past 12 hrs:   BP Temp Temp src Pulse Resp SpO2   04/30/23 1500 119/59 98.2  F (36.8  C) Oral 66 17 94 %   04/30/23 0700 93/58 99.1  F (37.3  C) Oral -- 17 93 %   04/30/23 0612 115/59 97.4  F (36.3  C) Oral 78 17 93 %   Alert cachexic woman supine in bed nad ox3 did not cough once during the entire interview.   , daughter, and son in law at bedside.   Diffusely decreased but no rales, wheezes, rhonchi  RRR  _CCE  Pale dry skin       Data:     Lab Results   Component Value Date    WBC 8.2 04/30/2023    WBC 6.8 04/29/2023    WBC 9.0 04/15/2023    HGB 13.7 04/30/2023    HGB 14.9 04/29/2023    HGB 14.0 04/15/2023    HCT 40.5 04/30/2023    HCT 45.1 04/29/2023    HCT 43.0 04/15/2023     04/30/2023     04/29/2023     04/15/2023     04/30/2023     04/29/2023     04/15/2023    POTASSIUM 3.4 04/30/2023    POTASSIUM 4.2 04/29/2023    POTASSIUM 3.6 04/15/2023    CHLORIDE 103 04/30/2023    CHLORIDE 99 04/29/2023    CHLORIDE 101 04/15/2023    CO2 26 04/30/2023    CO2 27 04/29/2023    CO2 30 (H) 04/15/2023    BUN 18.9 04/30/2023    BUN 23.8 (H) 04/29/2023    BUN 25.4 (H) 04/15/2023    CR 0.51 04/30/2023    CR 0.55 04/29/2023    CR 0.69 04/15/2023     (H) 04/30/2023     (H) 04/29/2023     (H) 04/15/2023    DD 1.18 (H) 04/29/2023    TROPI <0.015 06/28/2021    TROPI <0.015 06/28/2021    TROPI <0.015 06/27/2021    AST 61 (H) 04/29/2023    AST 30 06/27/2021    AST 27 08/08/2017    ALT 42 (H) 04/29/2023    ALT 38 06/27/2021    ALT 25 08/08/2017    ALKPHOS 49 04/29/2023    ALKPHOS 48 06/27/2021    ALKPHOS 52 08/08/2017    BILITOTAL 0.6 04/29/2023    BILITOTAL 0.3 06/27/2021    BILITOTAL 0.6 08/08/2017    INR 0.92 02/10/2011       All imaging studies reviewed by me.     EXAM: CT CHEST PULMONARY  EMBOLISM W CONTRAST  LOCATION: [facility]  DATE/TIME: 4/29/2023 8:51 PM CDT     INDICATION: hemoptysis  COMPARISON: CT chest 04/07/2019  TECHNIQUE: CT chest pulmonary angiogram during arterial phase injection of IV contrast. Multiplanar reformats and MIP reconstructions were performed. Dose reduction techniques were used.   CONTRAST: 54mL Isovue 370     FINDINGS:  ANGIOGRAM CHEST: Pulmonary arteries are normal caliber and negative for pulmonary emboli. Thoracic aorta is negative for dissection. No CT evidence of right heart strain.     LUNGS AND PLEURA: Mild layering low-density fluid/secretions within the right mainstem bronchus. Mild bronchial wall thickening. Multifocal parenchymal scarring. No focal consolidation or pleural effusion. Mosaic lung attenuation suggesting air trapping.   Stable benign 14 mm lingular subpleural nodule, unchanged since 04/07/2019.     MEDIASTINUM/AXILLAE: Prominent pericardial recess fluid extending around the ascending thoracic aorta. No thoracic adenopathy. Borderline cardiomegaly. No pericardial effusion.     CORONARY ARTERY CALCIFICATION: Minimal     UPPER ABDOMEN: Partially visualized left renal cysts. No follow-up is indicated.     MUSCULOSKELETAL: Normal.                                                                      IMPRESSION:  1.  No pulmonary artery embolism.  2.  Findings suggesting bronchiolitis including bronchial wall thickening and pulmonary air trapping. No pneumonic infiltrate or pleural effusion.  3.  Stable benign 14 mm lingular nodule, unchanged since 04/07/2019.

## 2023-04-30 NOTE — PLAN OF CARE
Observation Goals:     -diagnostic tests and consults completed and resulted - not yet, awaiting Pulmonology consult.     -vital signs normal or at patient baseline - VS stable and on room air.     -safe disposition plan has been identified - not yet.     -Nurse to notify provider when observation goals have been met and patient is ready for discharge. - not yet.

## 2023-04-30 NOTE — PROVIDER NOTIFICATION
MD Notification    Notified Person: MD    Notified Person Name: Brad Bonilla    Notification Date/Time: 4/30/23 0137    Notification Interaction: [place] [employer] web    Purpose of Notification: 9062 M.P. - pt wants to take her PTA Melatonin 10mg and Lipitor tonight. Is this ok for you, pls order if ok. thanks Pilar [phone]    Orders Received: Melatonin 10mg at bedtime PRN    Comments:

## 2023-04-30 NOTE — PROGRESS NOTES
[facility]    Hospitalist Progress Note    Assessment & Plan   Date of Admission:  4/29/2023        Assessment & Plan  Carmen Zhang is a 82 year old female with past medical history including Ménière's disease mild cognitive impairment, hypertension, history of benign thoracic mass status post resection who presents with hemoptysis.  Admitted on 4/29/2023.      Bronchiolitis   Hemoptysis   *Presents with hemoptysis in setting of recent chest and sinus infection. May or may not have been on antibiotics recently (patient unsure, has MCI as below)   -States pt had abx prescribed 7 days ago for URI tx at [facility] ED. See ED note, presented with 5 days productive cough. Some nasal congestion, completed 3 day amoxillin course given in [place] prior to ed visit.  Discharged on augmentin 7 days for possiblle maxillary sinusitis and cough. cxr without pneumonia  Felt better. Completed abx on Friday  Yesterday had worsening cough of grey white sputum. Slightly streaking of blood in afternoon but later in day coughed up phlegm with bright red blood ~3 TBSP. Again coughed up some blood but darker in ED, ~1 tbsp    *CT chest PE protocol with findings suggesting bronchiolitis including bronchial wall thickening and pulmonary air-trapping, negative for PE  *COVID19/Influenza/RSV PCR negative  *Procalcitonin 0.05, WBC normal   *Case discussed with pulmonology in ED, recommended steroids and antibiotics  *Currently on room air  -today. Coughing a lot less today. No hemoptysis  No hematemesis  Feeling better today. No new issues.    at bedside  No current sinus sxs.     Plan;   - Pulmonology consulted  - Prednisone 40 mg daily  - Continue ceftriaxone IV, azithromycin PO pending pulmonology evaluation      Troponin elevation  Pericardial recess fluid collection  Hx of thoracic mass s/p resection   *Troponin checked in ED for unclear reasons, elevated at 412->324  *Has been having chest pain associated  with coughing, no recent anginal-type symptoms, notes she is active walking up stairs frequently without chest pain or shortness of breath   *EKG non-ischemic  *Also incidentally noted to have pericardial recess fluid collection as above.  Patient reports history of possible lung cancer in her 30s, on thoracotomy was found to have a fist-sized mass attached to her heart which was resected and turned out benign.  Suspect fluid collection due to potential space left from this.    -no cp or sob. Nl stress echo per epic 6/2021. No recent cardiac sxs per pt and .   - no clear angingal sxs. Doubt ACS,   holdon asa given hemoptysis    - Telemetry  - Echocardiogram   -add on troponin am labs     Hyperlipidemia  - Hold prior to admission statin while observation status     Hypertension  - Resume prior to admission hydrochlorothiazide when dose confirmed by pharmacy      Mild cognitive impairment  Follows with Dr. Fish. Notes indicate issues with irritability, aggression towards family, social inappropriateness in addition to memory loss.   - Re-orient as needed  - Maintain normal day/night, sleep wake cycles  - Minimize sedating/altering medications as able  - Treat separate conditions as detailed above/below            Diet:   Regular diet  DVT Prophylaxis: Observation patient, short stay anticipated   Hoff Catheter: Not present  Code Status:   Full code        Disposition Plan  Ocate to observation status. Anticipate discharge within 24 hours if clinically improved.        Christophe Lucia MD, MD  Text Page  (7am to 6pm)  Interval History   Coughing a lot less today. No hemoptysis  No hematemesis  Feeling better today. No new issues.    at bedside  States pt had abx prescribed 7 days ago for URI tx at [facility] ED.   [place] better. Completed abx on Friday  Yesterday had worsening cough of grey white sputum. Slightly streaking of blood in afternoon but later in day coughed up phlegm with bright red blood ~3  TBSP. Again coughed up some blood but darker in ED, ~1 tbsp    -Data reviewed today: I reviewed all new labs and imaging results over the last 24 hours. I personally reviewed imaging and labs since admission.     Physical Exam   Temp: 99.1  F (37.3  C) Temp src: Oral BP: 93/58 Pulse: 78   Resp: 17 SpO2: 93 % O2 Device: None (Room air)    There were no vitals filed for this visit.  Vital Signs with Ranges  Temp:  [97.4  F (36.3  C)-99.1  F (37.3  C)] 99.1  F (37.3  C)  Pulse:  [65-94] 78  Resp:  [17-20] 17  BP: ()/(44-75) 93/58  SpO2:  [93 %-97 %] 93 %  No intake/output data recorded.    Constitutional: Up on side of bed eating, nad  Respiratory: CTAB  Cardiovascular: RRR no r/g/m  GI: soft, nt, nd  Skin/Integumen: no rash or edema  Neuro: alert, nl speech, grossly nonfocal, forgetful, looks to  sometimes to help with questions  Psych: calm, cooperative      Medications       azithromycin  250 mg Oral Daily     cefTRIAXone  1 g Intravenous Q24H     predniSONE  40 mg Oral Daily       Data   Recent Labs   Lab 04/30/23  0726 04/29/23  1904   WBC 8.2 6.8   HGB 13.7 14.9   MCV 94 95    169    138   POTASSIUM 3.4 4.2   CHLORIDE 103 99   CO2 26 27   BUN 18.9 23.8*   CR 0.51 0.55   ANIONGAP 11 12   FLAKO 9.4 10.1   * 116*   ALBUMIN  --  4.4   PROTTOTAL  --  7.2   BILITOTAL  --  0.6   ALKPHOS  --  49   ALT  --  42*   AST  --  61*       Imaging:   Recent Results (from the past 24 hour(s))   CT Chest Pulmonary Embolism w Contrast    Narrative    EXAM: CT CHEST PULMONARY EMBOLISM W CONTRAST  LOCATION: [facility]  DATE/TIME: 4/29/2023 8:51 PM CDT    INDICATION: hemoptysis  COMPARISON: CT chest 04/07/2019  TECHNIQUE: CT chest pulmonary angiogram during arterial phase injection of IV contrast. Multiplanar reformats and MIP reconstructions were performed. Dose reduction techniques were used.   CONTRAST: 54mL Isovue 370    FINDINGS:  ANGIOGRAM CHEST: Pulmonary arteries are  normal caliber and negative for pulmonary emboli. Thoracic aorta is negative for dissection. No CT evidence of right heart strain.    LUNGS AND PLEURA: Mild layering low-density fluid/secretions within the right mainstem bronchus. Mild bronchial wall thickening. Multifocal parenchymal scarring. No focal consolidation or pleural effusion. Mosaic lung attenuation suggesting air trapping.   Stable benign 14 mm lingular subpleural nodule, unchanged since 04/07/2019.    MEDIASTINUM/AXILLAE: Prominent pericardial recess fluid extending around the ascending thoracic aorta. No thoracic adenopathy. Borderline cardiomegaly. No pericardial effusion.    CORONARY ARTERY CALCIFICATION: Minimal    UPPER ABDOMEN: Partially visualized left renal cysts. No follow-up is indicated.    MUSCULOSKELETAL: Normal.      Impression    IMPRESSION:  1.  No pulmonary artery embolism.  2.  Findings suggesting bronchiolitis including bronchial wall thickening and pulmonary air trapping. No pneumonic infiltrate or pleural effusion.  3.  Stable benign 14 mm lingular nodule, unchanged since 04/07/2019.

## 2023-04-30 NOTE — ED NOTES
[facility]  ED Nurse Handoff Report    ED Chief complaint: Hemoptysis      ED Diagnosis:   Final diagnoses:   Acute bronchitis, unspecified organism   Hemoptysis   Elevated troponin       Code Status: to be addressed by admitting provider    Allergies:   Allergies   Allergen Reactions     Diatrizoate Hives and Itching     Dye [Contrast Dye] Hives and Itching     Iodine Hives     Seasonal Allergies        Patient Story: Productive cough since this morning with yellow sputum with red streaking.    Focused Assessment:  A&Ox4, forgetful. Productive cough with blood, mild sob. Given neb treatment, solumedrol, and abx x2. Denies cp. VSS on RA.  Baseline independent with ambulation, assist x1 in ED. Calm, cooperative.      Labs Ordered and Resulted from Time of ED Arrival to Time of ED Departure   D DIMER QUANTITATIVE - Abnormal       Result Value    D-Dimer Quantitative 1.18 (*)    COMPREHENSIVE METABOLIC PANEL - Abnormal    Sodium 138      Potassium 4.2      Chloride 99      Carbon Dioxide (CO2) 27      Anion Gap 12      Urea Nitrogen 23.8 (*)     Creatinine 0.55      Calcium 10.1      Glucose 116 (*)     Alkaline Phosphatase 49      AST 61 (*)     ALT 42 (*)     Protein Total 7.2      Albumin 4.4      Bilirubin Total 0.6      GFR Estimate 90     TROPONIN T, HIGH SENSITIVITY - Abnormal    Troponin T, High Sensitivity 412 (*)    PROCALCITONIN - Abnormal    Procalcitonin 0.05 (*)    TROPONIN T, HIGH SENSITIVITY - Abnormal    Troponin T, High Sensitivity 324 (*)    INFLUENZA A/B, RSV, & SARS-COV2 PCR - Normal    Influenza A PCR Negative      Influenza B PCR Negative      RSV PCR Negative      SARS CoV2 PCR Negative     CBC WITH PLATELETS AND DIFFERENTIAL    WBC Count 6.8      RBC Count 4.77      Hemoglobin 14.9      Hematocrit 45.1      MCV 95      MCH 31.2      MCHC 33.0      RDW 12.4      Platelet Count 169      % Neutrophils 55      % Lymphocytes 34      % Monocytes 8      % Eosinophils 2      %  Basophils 1      % Immature Granulocytes 0      NRBCs per 100 WBC 0      Absolute Neutrophils 3.7      Absolute Lymphocytes 2.3      Absolute Monocytes 0.6      Absolute Eosinophils 0.2      Absolute Basophils 0.1      Absolute Immature Granulocytes 0.0      Absolute NRBCs 0.0     BLOOD CULTURE   BLOOD CULTURE     CT Chest Pulmonary Embolism w Contrast   Final Result   IMPRESSION:   1.  No pulmonary artery embolism.   2.  Findings suggesting bronchiolitis including bronchial wall thickening and pulmonary air trapping. No pneumonic infiltrate or pleural effusion.   3.  Stable benign 14 mm lingular nodule, unchanged since 04/07/2019.        Treatments and/or interventions provided:   Medications   azithromycin (ZITHROMAX) 500 mg vial to attach to  mL bag (500 mg Intravenous $New Bag 4/29/23 2256)   diphenhydrAMINE (BENADRYL) injection 25 mg (25 mg Intravenous $Given 4/29/23 1951)   methylPREDNISolone sodium succinate (solu-MEDROL) injection 62.5 mg (62.5 mg Intravenous $Given 4/29/23 1952)   Saline Flush (81 mLs Intravenous $Given 4/29/23 2020)   iopamidol (ISOVUE-370) solution 54 mL (54 mLs Intravenous $Given 4/29/23 2020)   ipratropium - albuterol 0.5 mg/2.5 mg/3 mL (DUONEB) neb solution 3 mL (3 mLs Nebulization $Given 4/29/23 2243)   cefTRIAXone (ROCEPHIN) 2 g vial to attach to  ml bag for ADULTS or NS 50 ml bag for PEDS (0 g Intravenous Stopped 4/29/23 2300)   methylPREDNISolone sodium succinate (solu-MEDROL) injection 62.5 mg (62.5 mg Intravenous $Given 4/29/23 2222)     Patient's response to treatments and/or interventions: improved sx    To be done/followed up on inpatient unit:  Continue with plan of care per admitting MD.    Does this patient have any cognitive concerns?: none    Activity level - Baseline/Home:  Independent  Activity Level - Current:   Stand with Assist    Patient's Preferred language: English   Needed?: No    Isolation: None  Infection: Not Applicable  Patient tested  for COVID 19 prior to admission: YES  Bariatric?: No    Vital Signs:   Vitals:    04/29/23 1930 04/29/23 2000 04/29/23 2100 04/29/23 2145   BP:    (!) 142/72   Pulse:    75   Resp:    20   Temp:       TempSrc:       SpO2: 96% 97% 94% 94%       Cardiac Rhythm:     Was the PSS-3 completed:   Yes  What interventions are required if any?               Family Comments:  at bedside  OBS brochure/video discussed/provided to patient/family: N/A                For the majority of the shift this patient's behavior was Green.   Behavioral interventions performed were na.    ED NURSE PHONE NUMBER: *[phone]

## 2023-04-30 NOTE — PROGRESS NOTES
RECEIVING UNIT ED HANDOFF REVIEW    ED Nurse Handoff Report was reviewed by: Nadira Oswald RN on April 30, 2023 at 12:19 AM

## 2023-04-30 NOTE — PHARMACY-ADMISSION MEDICATION HISTORY
Pharmacy Intern Admission Medication History    Admission medication history is complete. The information provided in this note is only as accurate as the sources available at the time of the update.    Medication reconciliation/reorder completed by provider prior to medication history? No    Information Source(s): Family member and CareEverywhere/[employer] via phone    Pertinent Information:   -I spoke with the patient's  Levi to verify the patient's medication list.  -The patient's betahistine prescription is compounded and her  will bring in their own supply this morning.   -Levi states that Carmen Davila's doctor advised them to monitor for nightmares with donepezil.     Changes made to PTA medication list:    Added:   o Vitamin C    Deleted:   o Cephalexin  o Diazepam  o Lidocaine patch    Changed:   o Vitamin B12 changed from daily to MWF  o Mupirocin changed from daily to PRN    Medication Affordability:  Not including over the counter (OTC) medications, was there a time in the past 12 months when you did not take your medications as prescribed because of cost? No     Allergies reviewed with patient and updates made in EHR: yes    Medication History Completed By: Charito Weston 4/30/2023 9:39 AM    Prior to Admission medications    Medication Sig Last Dose Taking? Auth Provider Long Term End Date   Ascorbic Acid (VITAMIN C PO) Take 1 tablet by mouth daily 4/29/2023 at AM Yes Unknown, Entered By History     ASPIRIN 81 MG OR TABS Take 81 mg by mouth daily  4/29/2023 at AM Yes Reported, Patient     atorvastatin (LIPITOR) 10 MG tablet Take 1 tablet (10 mg) by mouth daily Pt is requesting that she only receive [employer]. 4/29/2023 at AM Yes Martinez Jenkins MD Yes    Betahistine HCl (BETAHISTINE DIHYDROCHLORIDE) POWD Take 8 mg by mouth 2 times daily 4/29/2023 at AM Yes Reported, Patient     calcium-vitamin D (CALCIUM 600 + D) 600-400 MG-UNIT per tablet Take 1 tablet by mouth 2 times daily FOR  BONE HEALTH AND FOR VITAMIN D DEFICIENCY (LOW VITAMIN D) 4/29/2023 at AM Yes Martinez Jenkins MD     carboxymethylcellulose PF (REFRESH PLUS) 0.5 % ophthalmic solution Place 1 drop into both eyes 4 times daily as needed for dry eyes Uncertain of otc product  at PRN Yes Unknown, Entered By History     Coenzyme Q10 (COQ10 PO) TAKE 2 TEASPOONS OF QUNOL-LIQUID DAILY 4/29/2023 at AM Yes Roman Nogueira MD     cyabnocobalamin (VITAMIN B-12) 2500 MCG sublingual tablet Take 2,500 mcg by mouth daily INDICATION: FOR VITAMIN B12 SUPPLEMENTATION - TO IMPROVE MEMORY, BALANCE, SLEEP AND MOOD, DIRECTIONS: PLEASE PLACE UNDER THE TONGUE TO DISSOLVE  Patient taking differently: Take 2,500 mcg by mouth Every Mon, Wed, Fri Morning INDICATION: FOR VITAMIN B12 SUPPLEMENTATION - TO IMPROVE MEMORY, BALANCE, SLEEP AND MOOD, DIRECTIONS: PLEASE PLACE UNDER THE TONGUE TO DISSOLVE. 4/28/2023 at AM Yes Martinez Jenkins MD     donepezil (ARICEPT) 5 MG tablet Take 5 mg by mouth At Bedtime 4/28/2023 at PM Yes Reported, Patient     hydrochlorothiazide (HYDRODIURIL) 25 MG tablet Take 25 mg by mouth daily 4/29/2023 at AM Yes Unknown, Entered By History No    Lutein-Zeaxanthin 25-5 MG CAPS Take 1 capsule by mouth daily 4/29/2023 at AM Yes Reported, Patient     Melatonin 10 MG TABS Take 10 mg by mouth nightly as needed   at PRN Yes Reported, Patient     multivitamin w/minerals (THERA-VIT-M) tablet Take 1 tablet by mouth daily 4/29/2023 at AM Yes Unknown, Entered By History     mupirocin (BACTROBAN) 2 % external ointment Apply topically 3 times daily  Patient taking differently: Apply topically 3 times daily as needed  at PRN Yes Jacob Vergara, PA-C     Omega-3 Fatty Acids (FISH OIL) 1200 MG capsule Take 1,200 mg by mouth daily 4/29/2023 at AM Yes Unknown, Entered By History     polyethylene glycol (MIRALAX) powder Take 17 g (1 capful) by mouth daily INDICATION: CONSTIPATION, TO ACHIEVE 1-2 SOFT BMs PER DAY 4/29/2023 at AM Yes Martinez Jenkins  MD KARLY     potassium citrate (UROCIT-K) 10 MEQ (1080 MG) CR tablet Take 10 mEq by mouth 3 times daily (with meals) 4/29/2023 at PM Yes Unknown, Entered By History     Vitamin D3 (CHOLECALCIFEROL) 125 MCG (5000 UT) tablet Take 125 mcg by mouth daily 4/29/2023 at AM Yes Unknown, Entered By History     vitamin E (TOCOPHEROL) 1000 units (450 mg) capsule Take 1,000 Units by mouth daily 4/29/2023 at AM Yes Unknown, Entered By History     zinc gluconate 50 MG tablet Take 50 mg by mouth daily Unsure of which zinc formulation 4/29/2023 at AM Yes Unknown, Entered By History

## 2023-05-01 VITALS
DIASTOLIC BLOOD PRESSURE: 56 MMHG | BODY MASS INDEX: 20.06 KG/M2 | OXYGEN SATURATION: 94 % | WEIGHT: 109 LBS | RESPIRATION RATE: 18 BRPM | HEIGHT: 62 IN | HEART RATE: 87 BPM | SYSTOLIC BLOOD PRESSURE: 113 MMHG | TEMPERATURE: 97 F

## 2023-05-01 LAB
ALBUMIN SERPL BCG-MCNC: 3.8 G/DL (ref 3.5–5.2)
ALP SERPL-CCNC: 41 U/L (ref 35–104)
ALT SERPL W P-5'-P-CCNC: 31 U/L (ref 10–35)
APTT PPP: 25 SECONDS (ref 22–38)
AST SERPL W P-5'-P-CCNC: 31 U/L (ref 10–35)
BILIRUB DIRECT SERPL-MCNC: <0.2 MG/DL (ref 0–0.3)
BILIRUB SERPL-MCNC: 0.5 MG/DL
INR PPP: 1.12 (ref 0.85–1.15)
PROT SERPL-MCNC: 6.1 G/DL (ref 6.4–8.3)
TROPONIN T SERPL HS-MCNC: 96 NG/L

## 2023-05-01 PROCEDURE — 99238 HOSP IP/OBS DSCHRG MGMT 30/<: CPT | Performed by: INTERNAL MEDICINE

## 2023-05-01 PROCEDURE — 36415 COLL VENOUS BLD VENIPUNCTURE: CPT | Performed by: INTERNAL MEDICINE

## 2023-05-01 PROCEDURE — 250N000013 HC RX MED GY IP 250 OP 250 PS 637: Performed by: INTERNAL MEDICINE

## 2023-05-01 PROCEDURE — 250N000012 HC RX MED GY IP 250 OP 636 PS 637: Performed by: INTERNAL MEDICINE

## 2023-05-01 PROCEDURE — 99204 OFFICE O/P NEW MOD 45 MIN: CPT | Performed by: INTERNAL MEDICINE

## 2023-05-01 PROCEDURE — 80076 HEPATIC FUNCTION PANEL: CPT | Performed by: INTERNAL MEDICINE

## 2023-05-01 PROCEDURE — 85730 THROMBOPLASTIN TIME PARTIAL: CPT | Performed by: INTERNAL MEDICINE

## 2023-05-01 PROCEDURE — 84484 ASSAY OF TROPONIN QUANT: CPT | Performed by: INTERNAL MEDICINE

## 2023-05-01 PROCEDURE — G0378 HOSPITAL OBSERVATION PER HR: HCPCS | Mod: CS

## 2023-05-01 PROCEDURE — 85610 PROTHROMBIN TIME: CPT | Performed by: INTERNAL MEDICINE

## 2023-05-01 RX ORDER — AZITHROMYCIN 250 MG/1
250 TABLET, FILM COATED ORAL DAILY
Qty: 3 TABLET | Refills: 0 | Status: SHIPPED | OUTPATIENT
Start: 2023-05-01 | End: 2023-05-04

## 2023-05-01 RX ORDER — ALBUTEROL SULFATE 90 UG/1
2 AEROSOL, METERED RESPIRATORY (INHALATION) 4 TIMES DAILY PRN
Status: DISCONTINUED | OUTPATIENT
Start: 2023-05-01 | End: 2023-05-01 | Stop reason: HOSPADM

## 2023-05-01 RX ORDER — PREDNISONE 10 MG/1
TABLET ORAL
Qty: 18 TABLET | Refills: 0 | Status: SHIPPED | OUTPATIENT
Start: 2023-05-01

## 2023-05-01 RX ORDER — ALBUTEROL SULFATE 90 UG/1
2 AEROSOL, METERED RESPIRATORY (INHALATION) 4 TIMES DAILY
Status: DISCONTINUED | OUTPATIENT
Start: 2023-05-01 | End: 2023-05-01 | Stop reason: HOSPADM

## 2023-05-01 RX ORDER — ALBUTEROL SULFATE 90 UG/1
2 AEROSOL, METERED RESPIRATORY (INHALATION) 4 TIMES DAILY PRN
Qty: 18 G | Refills: 0 | Status: SHIPPED | OUTPATIENT
Start: 2023-05-01

## 2023-05-01 RX ADMIN — PREDNISONE 40 MG: 20 TABLET ORAL at 08:51

## 2023-05-01 RX ADMIN — ALBUTEROL SULFATE 2 PUFF: 90 AEROSOL, METERED RESPIRATORY (INHALATION) at 11:45

## 2023-05-01 ASSESSMENT — ACTIVITIES OF DAILY LIVING (ADL)
ADLS_ACUITY_SCORE: 35

## 2023-05-01 NOTE — PROVIDER NOTIFICATION
MD Notification    Notified Person: MD    Notified Person Name:Christophe Lucia MD    Notification Date/Time:5-1 1341    Notification Interaction:zahra    Purpose of Notification:Cardiology signed off. Please discharge if approp.    Orders Received:    Comments:

## 2023-05-01 NOTE — PROGRESS NOTES
Observation goals  PRIOR TO DISCHARGE       Comments:   -diagnostic tests and consults completed and resulted- Partially Met    -vital signs normal or at patient baseline- Met    -safe disposition plan has been identified-  met

## 2023-05-01 NOTE — DISCHARGE SUMMARY
[facility]    Discharge Summary  Hospitalist    Date of Admission:  4/29/2023  Date of Discharge:  5/1/2023  Discharging Provider: Christophe Lucia MD, MD    Discharge Diagnoses   Hemoptysis-minor likely secondary to bronchiolitis.  Bronchiolitis and air trapping on CT scan  Benign lung nodule.  Type II STEMI secondary to demand ischemia from acute illness  History of iliac mass resection  Incidental finding of pericardial recess fluid collection.  Likely related to prior mass resection.  No further evaluation needed    History of Present Illness   Carmen Zhang is a 82 year old female who presents with the above chief complaint.     History is obtained from the patient, discussion with ED provider and review of medical record. History from patient somewhat limited due to to her cognitive impairment.  She reports she has recently been dealing with a sinus and chest infection, initially coughing up yellow sputum.  She cannot say definitively when her symptoms started.  She may have been prescribed an antibiotic, but she is not sure by whom or when.  On the day of presentation, she reports trying to cough up some phlegm and noting a half dollar sized amount of rach red blood.  This prompted her presentation to the emergency department.  She reports she has had some chest discomfort associated with coughing since she has been ill, she otherwise is active walking up stairs frequently with no chest pain or shortness of breath with stairs.  She denies any prior history of heart disease or MI.  She denies any fevers or chills.  She denies any known history of lung disease.  In her 30s she was found to have a possible lung cancer, however thoracotomy revealed a fist-sized mass attached to her heart which was resected and turned out to be benign.     In the Emergency Department, the patient was seen by Dr. Guerin, with whom I discussed the patient's presenting symptoms and emergency department  course.  Initial vital signs were a temperature of 98F, HR 65, /74, RR 18, SpO2 95% on room air. Pertinent work-up as noted in A&P. The patient received IV azithromycin, IV ceftriaxone, IV diphenhydramine, IV steroids and Hospitalists were contacted for admission to the hospital.           Hospital Course   Carmen Zhang was admitted on 4/29/2023.  The following problems were addressed during her hospitalization:    Principal Problem:    Elevated troponin  Active Problems:    Hemoptysis    Acute bronchitis, unspecified organism  Date of Admission:  4/29/2023        Assessment & Plan  Carmen Zhang is a 82 year old female with past medical history including Ménière's disease mild cognitive impairment, hypertension, history of benign thoracic mass status post resection who presents with hemoptysis.  Admitted on 4/29/2023.      Bronchiolitis   Hemoptysis   *Presents with hemoptysis in setting of recent chest and sinus infection. May or may not have been on antibiotics recently (patient unsure, has [facility] as below)   -States pt had abx prescribed 7 days ago for URI tx at [facility] ED. See ED note, presented with 5 days productive cough. Some nasal congestion, completed 3 day amoxillin course given in [place] prior to ed visit.  Discharged on augmentin 7 days for possiblle maxillary sinusitis and cough. cxr without pneumonia  Felt better. Completed abx on Friday  Yesterday had worsening cough of grey white sputum. Slightly streaking of blood in afternoon but later in day coughed up phlegm with bright red blood ~3 TBSP. Again coughed up some blood but darker in ED, ~1 tbsp     *CT chest PE protocol with findings suggesting bronchiolitis including bronchial wall thickening and pulmonary air-trapping, negative for PE  *COVID19/Influenza/RSV PCR negative  *Procalcitonin 0.05, WBC normal   *Case discussed with pulmonology in ED, recommended steroids and antibiotics  *Currently on room air  -today. Coughing a lot less  today. No hemoptysis  No hematemesis  Feeling better today. No new issues.    at bedside  No current sinus sxs.   Doing well. No further hemopytisis  Pulmonary consulted, rec course azithromcyin, pred taper, repeat ct chest 2 weeks, pulmonary follow up erum office [facility] in 3 weeks  - albuterol inhaler prn, scheduled tid for 7 days then prn.   -ok for discharge.   -PCP follow-up.  Albuterol inhaler education prior to discharge by RN.  -Prednisone taper.  Complete course of azithromycin.  -Albuterol inhaler scheduled 3 times daily for the next 7 days then as needed     Troponin elevation  Pericardial recess fluid collection  Hx of thoracic mass s/p resection   *Troponin checked in ED for unclear reasons, elevated at 412->324  *Has been having chest pain associated with coughing, no recent anginal-type symptoms, notes she is active walking up stairs frequently without chest pain or shortness of breath   *EKG non-ischemic  *Also incidentally noted to have pericardial recess fluid collection as above.  Patient reports history of possible lung cancer in her 30s, on thoracotomy was found to have a fist-sized mass attached to her heart which was resected and turned out benign.  Suspect fluid collection due to potential space left from this.     -no cp or sob. Nl stress echo per epic 6/2021. No recent cardiac sxs per pt and .   - no clear angingal sxs. Doubt ACS,   holdon asa given hemoptysis- unless cardiology feels strongly about continuing  - no anginal sxs. No RN concerns  -nl tele  -Echo normal  -troponin downtrending down to 96.  Peak at 324.  -CT shows: Prominent pericardial recess fluid extending around the ascending thoracic aorta. No thoracic adenopathy.  -Neurology consulted.  Thought to have a troponin elevation secondary to demand ischemia.  Type II NSTEMI.  No further evaluation at this time.  Consider outpatient cardiac stress testing if she is not able to resume her prior level of exertion or  develops or anginal-like symptoms.  May resume aspirin once okay with PCP from a bleeding standpoint.  -Recess fluid likely incidental finding from prior cardiac surgery, mass resection.  No further evaluation needed.  Echocardiogram reviewed and normal.   -Patient will follow-up with cardiology outpatient    Hyperlipidemia  -Continue prior to admission medication     Hypertension  -New prior to admission medication     Mild cognitive impairment  Follows with Dr. Fish. Notes indicate issues with irritability, aggression towards family, social inappropriateness in addition to memory loss.   -No acute issues while in the hospital.      Diet:   Regular diet  DVT Prophylaxis: Observation patient, short stay anticipated   Hoff Catheter: Not present  Code Status:   Full code        Disposition Plan-discharge home with PCP follow-up and cardiology follow-up.    Christophe Lucia MD, MD    Significant Results and Procedures       Pending Results   These results will be followed up by hospitalist  Unresulted Labs Ordered in the Past 30 Days of this Admission     Date and Time Order Name Status Description    4/29/2023 10:04 PM Blood Culture Peripheral Blood Preliminary     4/29/2023 10:04 PM Blood Culture Peripheral Blood Preliminary           Code Status   Full Code       Primary Care Physician   Alison Hernandez    Physical Exam   Temp: 97  F (36.1  C) Temp src: Oral BP: 113/56 Pulse: 87   Resp: 18 SpO2: 94 % O2 Device: None (Room air)    Vitals:    05/01/23 1000   Weight: 49.4 kg (109 lb)     Vital Signs with Ranges  Temp:  [97  F (36.1  C)-98.2  F (36.8  C)] 97  F (36.1  C)  Pulse:  [60-93] 87  Resp:  [17-18] 18  BP: (105-136)/(52-67) 113/56  SpO2:  [93 %-95 %] 94 %  I/O last 3 completed shifts:  In: 950 [P.O.:950]  Out: -     Constitutional: Up in chair, eating, nad  Respiratory:     CTAB  Slight exp wheeze  occ dry cough during exam  Cardiovascular: RRR no r/g/m  GI: soft, nt, nd  Skin/Integumen: no rash or  edema  Neuro: alert, nl speech, grossly nonfocal, forgetful,  Psych: calm, cooperative       Discharge Disposition   Discharged to home  Condition at discharge: Good    Consultations This Hospital Stay   PULMONARY IP CONSULT  CARDIOLOGY IP CONSULT    Time Spent on this Encounter   I, Christophe Lucia MD, personally saw the patient today and spent less than or equal to 30 minutes discharging this patient.    Discharge Orders      Follow-Up with Cardiology [facility]      Patient care order    1. Hold aspirin for now, your doctor will advise when to restart  2. Take albuterol inhaler to help with cough, take 2 puffs three times daily for 7 days then as needed for cough or wheezing.   3. Complete course of antibiotic-Azithromycin  4.complete course of prednisone to help with upper airway inflammation causing the coughing     Reason for your hospital stay    Upper airway inflammation (bronchilitis/bronchitis) causing cough and blood in sputum.     Activity    Your activity upon discharge: activity as tolerated     Follow-up and recommended labs and tests     Follow up appointment made for 5/4 at 10:55 am with Dr. Hernandez at  [address] [address]  [phone]     Patient care order    -Restart aspirin when ok with primary care doctor and no concern for further bleeding.   -You met with the cardiologist and they recommended no further evaluation.  If you have difficulty resuming your prior level of function or have chest discomfort or worsening breathing then your primary doctor can consider pursuing a heart stress test.     Follow-up and recommended labs and tests     1. Follow up with primary care doctor next week  2. Repeat CT scan of chest in 2 weeks- have your primary care doctor arrange. Recommendation from lung doctor  3. Follow up with [facility] -appt in [place] in 3 weeks. You have Dr. Appiah's card, call to make appt,  4.  Follow-up with [facility]  cardiology     Full Code     Diet    Follow this diet upon discharge: Orders Placed This Encounter      Regular Diet Adult     Discharge Medications   Current Discharge Medication List      START taking these medications    Details   albuterol (PROAIR HFA/PROVENTIL HFA/VENTOLIN HFA) 108 (90 Base) MCG/ACT inhaler Inhale 2 puffs into the lungs 4 times daily as needed for shortness of breath  Qty: 18 g, Refills: 0    Comments: Pharmacy may dispense brand covered by insurance (Proair, or proventil or ventolin or generic albuterol inhaler)  Associated Diagnoses: Acute bronchitis, unspecified organism      azithromycin (ZITHROMAX) 250 MG tablet Take 1 tablet (250 mg) by mouth daily for 3 doses  Qty: 3 tablet, Refills: 0    Associated Diagnoses: Acute bronchitis, unspecified organism      predniSONE (DELTASONE) 10 MG tablet Take three tablets (30mg) for three days then two tablets (20mg) for three days then one tablet (10mg) for three days then stop  Qty: 18 tablet, Refills: 0    Associated Diagnoses: Acute bronchitis, unspecified organism         CONTINUE these medications which have NOT CHANGED    Details   Ascorbic Acid (VITAMIN C PO) Take 1 tablet by mouth daily      atorvastatin (LIPITOR) 10 MG tablet Take 1 tablet (10 mg) by mouth daily Pt is requesting that she only receive [employer] Brand.  Qty: 90 tablet, Refills: 1    Associated Diagnoses: Mixed hyperlipidemia      calcium-vitamin D (CALCIUM 600 + D) 600-400 MG-UNIT per tablet Take 1 tablet by mouth 2 times daily FOR BONE HEALTH AND FOR VITAMIN D DEFICIENCY (LOW VITAMIN D)  Qty: 100 tablet, Refills: PRN    Associated Diagnoses: Asymptomatic postmenopausal status      carboxymethylcellulose PF (REFRESH PLUS) 0.5 % ophthalmic solution Place 1 drop into both eyes 4 times daily as needed for dry eyes Uncertain of otc product      Coenzyme Q10 (COQ10 PO) TAKE 2 TEASPOONS OF QUNOL-LIQUID DAILY  Refills: 0      cyabnocobalamin (VITAMIN B-12) 2500 MCG sublingual tablet Take  2,500 mcg by mouth daily INDICATION: FOR VITAMIN B12 SUPPLEMENTATION - TO IMPROVE MEMORY, BALANCE, SLEEP AND MOOD, DIRECTIONS: PLEASE PLACE UNDER THE TONGUE TO DISSOLVE  Qty: 250 tablet, Refills: 3    Associated Diagnoses: Vitamin B12 deficiency (non anemic)      donepezil (ARICEPT) 5 MG tablet Take 5 mg by mouth At Bedtime      hydrochlorothiazide (HYDRODIURIL) 25 MG tablet Take 25 mg by mouth daily      Lutein-Zeaxanthin 25-5 MG CAPS Take 1 capsule by mouth daily      Melatonin 10 MG TABS Take 10 mg by mouth nightly as needed       multivitamin w/minerals (THERA-VIT-M) tablet Take 1 tablet by mouth daily      mupirocin (BACTROBAN) 2 % external ointment Apply topically 3 times daily  Qty: 30 g, Refills: 0    Associated Diagnoses: Infected abrasion of left lower extremity, initial encounter      Omega-3 Fatty Acids (FISH OIL) 1200 MG capsule Take 1,200 mg by mouth daily      polyethylene glycol (MIRALAX) powder Take 17 g (1 capful) by mouth daily INDICATION: CONSTIPATION, TO ACHIEVE 1-2 SOFT BMs PER DAY  Qty: 1581 g, Refills: 2    Associated Diagnoses: Constipation, unspecified constipation type      potassium citrate (UROCIT-K) 10 MEQ (1080 MG) CR tablet Take 10 mEq by mouth 3 times daily (with meals)      Vitamin D3 (CHOLECALCIFEROL) 125 MCG (5000 UT) tablet Take 125 mcg by mouth daily      zinc gluconate 50 MG tablet Take 50 mg by mouth daily Unsure of which zinc formulation         STOP taking these medications       ASPIRIN 81 MG OR TABS Comments:   Reason for Stopping:         Betahistine HCl (BETAHISTINE DIHYDROCHLORIDE) POWD Comments:   Reason for Stopping:         vitamin E (TOCOPHEROL) 1000 units (450 mg) capsule Comments:   Reason for Stopping:             Allergies   Allergies   Allergen Reactions     Diatrizoate Hives and Itching     Dye [Contrast Dye] Hives and Itching     Iodine Hives     Seasonal Allergies      Data   Most Recent 3 CBC's:Recent Labs   Lab Test 04/30/23  0726 04/29/23  1901  04/15/23  2106   WBC 8.2 6.8 9.0   HGB 13.7 14.9 14.0   MCV 94 95 96    169 218      Most Recent 3 BMP's:  Recent Labs   Lab Test 04/30/23  0726 04/29/23  1904 04/15/23  2106    138 140   POTASSIUM 3.4 4.2 3.6   CHLORIDE 103 99 101   CO2 26 27 30*   BUN 18.9 23.8* 25.4*   CR 0.51 0.55 0.69   ANIONGAP 11 12 9   FLAKO 9.4 10.1 9.8   * 116* 109*     Most Recent 2 LFT's:  Recent Labs   Lab Test 05/01/23  0727 04/29/23  1904   AST 31 61*   ALT 31 42*   ALKPHOS 41 49   BILITOTAL 0.5 0.6     Most Recent INR's and Anticoagulation Dosing History:  Anticoagulation Dose History         Latest Ref Rng & Units 2/10/2011 5/1/2023   Recent Dosing and Labs   INR 0.85 - 1.15 0.92   1.12                Most Recent 3 Troponin's:  Recent Labs   Lab Test 06/28/21  0659 06/28/21  0300 06/27/21  2243   TROPI <0.015 <0.015 <0.015     Most Recent Cholesterol Panel:  Recent Labs   Lab Test 08/08/17  0857   CHOL 158   LDL 55   HDL 85   TRIG 92     Most Recent 6 Bacteria Isolates From Any Culture (See EPIC Reports for Culture Details):  Recent Labs   Lab Test 02/19/16  1221   CULT No growth     Most Recent TSH, T4 and A1c Labs:  Recent Labs   Lab Test 01/04/20  0938 08/08/17  0857   TSH 0.88 2.33   T4  --  0.97     Results for orders placed or performed during the hospital encounter of 04/29/23   CT Chest Pulmonary Embolism w Contrast    Narrative    EXAM: CT CHEST PULMONARY EMBOLISM W CONTRAST  LOCATION: [facility]  DATE/TIME: 4/29/2023 8:51 PM CDT    INDICATION: hemoptysis  COMPARISON: CT chest 04/07/2019  TECHNIQUE: CT chest pulmonary angiogram during arterial phase injection of IV contrast. Multiplanar reformats and MIP reconstructions were performed. Dose reduction techniques were used.   CONTRAST: 54mL Isovue 370    FINDINGS:  ANGIOGRAM CHEST: Pulmonary arteries are normal caliber and negative for pulmonary emboli. Thoracic aorta is negative for dissection. No CT evidence of right heart  strain.    LUNGS AND PLEURA: Mild layering low-density fluid/secretions within the right mainstem bronchus. Mild bronchial wall thickening. Multifocal parenchymal scarring. No focal consolidation or pleural effusion. Mosaic lung attenuation suggesting air trapping.   Stable benign 14 mm lingular subpleural nodule, unchanged since 2019.    MEDIASTINUM/AXILLAE: Prominent pericardial recess fluid extending around the ascending thoracic aorta. No thoracic adenopathy. Borderline cardiomegaly. No pericardial effusion.    CORONARY ARTERY CALCIFICATION: Minimal    UPPER ABDOMEN: Partially visualized left renal cysts. No follow-up is indicated.    MUSCULOSKELETAL: Normal.      Impression    IMPRESSION:  1.  No pulmonary artery embolism.  2.  Findings suggesting bronchiolitis including bronchial wall thickening and pulmonary air trapping. No pneumonic infiltrate or pleural effusion.  3.  Stable benign 14 mm lingular nodule, unchanged since 2019.   Echocardiogram Complete     Value    LVEF  60-65%    [facility]    [id number]  [id number]  [id number]  [id number]^MILI^CHRISTIAN^NEDA     [facility]  Echocardiography Laboratory  [address] [place] [address]     Name: MAGGY UP  MRN: [id number]  : 1940  Study Date: 2023 01:07 PM  Age: 82 yrs  Gender: Female  Patient Location: [facility]  Reason For Study: Other, Please Specify in Comments  Ordering Physician: CHRISTIAN GARCES  Referring Physician: Alison Hernandez  Performed By: Pk Gibson     BSA: 1.5 m2  Height: 62 in  Weight: 108 lb  HR: 61  BP: 97/44 mmHg  ______________________________________________________________________________  Procedure  Complete Portable Echo Adult.  ______________________________________________________________________________  Interpretation Summary     Left ventricular systolic function is normal.  The visual ejection fraction is 60-65%.  No regional wall motion abnormalities noted.  The  study was technically difficult. Compared to the prior study dated 2020,  there have been no changes.  ______________________________________________________________________________  Left Ventricle  The left ventricle is normal in size. There is normal left ventricular wall  thickness. Left ventricular systolic function is normal. The visual ejection  fraction is 60-65%. Grade II or moderate diastolic dysfunction. No regional  wall motion abnormalities noted.     Right Ventricle  The right ventricle is normal size. The right ventricular systolic function is  normal.     Atria  Normal left atrial size. Right atrial size is normal.     Mitral Valve  The mitral valve leaflets are mildly thickened. There is mild (1+) mitral  regurgitation.     Tricuspid Valve  There is mild (1+) tricuspid regurgitation. The right ventricular systolic  pressure is approximated at 28.6 mmHg plus the right atrial pressure. IVC  diameter and respiratory changes fall into an intermediate range suggesting an  RA pressure of 8 mmHg.     Aortic Valve  There is mild trileaflet aortic sclerosis. No aortic stenosis is present.     Pulmonic Valve  The pulmonic valve is not well seen, but is grossly normal.     Vessels  The aortic root is normal size.     Pericardium  Trivial pericardial effusion.     Rhythm  Sinus rhythm was noted. The patient exhibited occasional PACs.  ______________________________________________________________________________  MMode/2D Measurements & Calculations  IVSd: 0.87 cm     LVIDd: 3.7 cm  LVIDs: 1.9 cm  LVPWd: 0.85 cm  FS: 49.4 %  LV mass(C)d: 91.9 grams  LV mass(C)dI: 62.5 grams/m2  Ao root diam: 2.7 cm  LA dimension: 3.4 cm  asc Aorta Diam: 2.5 cm  LA/Ao: 1.2  LVOT diam: 1.8 cm  LVOT area: 2.5 cm2  LA Volume (BP): 45.1 ml  LA Volume Index (BP): 30.7 ml/m2  RWT: 0.46     Doppler Measurements & Calculations  MV E max babar: 90.4 cm/sec  MV A max babar: 57.8 cm/sec  MV E/A: 1.6  MV dec slope: 420.9 cm/sec2  MV dec time:  0.21 sec  PA acc time: 0.18 sec  TR max pepe: 267.4 cm/sec  TR max P.6 mmHg  E/E' av.7  Lateral E/e': 14.6  Medial E/e': 14.8  RV S Pepe: 14.0 cm/sec     ______________________________________________________________________________  Report approved by: Tomy Licea 2023 01:56 PM

## 2023-05-01 NOTE — PROGRESS NOTES
Hospitalist Progress Note    Assessment & Plan   Date of Admission:  4/29/2023        Assessment & Plan  Carmen Zhang is a 82 year old female with past medical history including Ménière's disease mild cognitive impairment, hypertension, history of benign thoracic mass status post resection who presents with hemoptysis.  Admitted on 4/29/2023.      Bronchiolitis   Hemoptysis   *Presents with hemoptysis in setting of recent chest and sinus infection. May or may not have been on antibiotics recently (patient unsure, has [facility] as below)   -States pt had abx prescribed 7 days ago for URI tx at [facility] ED. See ED note, presented with 5 days productive cough. Some nasal congestion, completed 3 day amoxillin course given in [place] prior to ed visit.  Discharged on augmentin 7 days for possiblle maxillary sinusitis and cough. cxr without pneumonia  Felt better. Completed abx on Friday  Yesterday had worsening cough of grey white sputum. Slightly streaking of blood in afternoon but later in day coughed up phlegm with bright red blood ~3 TBSP. Again coughed up some blood but darker in ED, ~1 tbsp    *CT chest PE protocol with findings suggesting bronchiolitis including bronchial wall thickening and pulmonary air-trapping, negative for PE  *COVID19/Influenza/RSV PCR negative  *Procalcitonin 0.05, WBC normal   *Case discussed with pulmonology in ED, recommended steroids and antibiotics  *Currently on room air  -today. Coughing a lot less today. No hemoptysis  No hematemesis  Feeling better today. No new issues.    at bedside  No current sinus sxs.   Doing well. No further hemopytisis  Pulmonary consulted, rec course azithromcyin, pred taper, repeat ct chest 2 weeks, pulmonary follow up [place] [facility] [place] [facility] in 3 weeks  - albuterol inhaler prn, scheduled tid for 7 days then prn.   -ok for discharge.      Troponin elevation  Pericardial recess fluid collection  Hx of thoracic mass s/p  resection   *Troponin checked in ED for unclear reasons, elevated at 412->324  *Has been having chest pain associated with coughing, no recent anginal-type symptoms, notes she is active walking up stairs frequently without chest pain or shortness of breath   *EKG non-ischemic  *Also incidentally noted to have pericardial recess fluid collection as above.  Patient reports history of possible lung cancer in her 30s, on thoracotomy was found to have a fist-sized mass attached to her heart which was resected and turned out benign.  Suspect fluid collection due to potential space left from this.    -no cp or sob. Nl stress echo per epic 6/2021. No recent cardiac sxs per pt and .   - no clear angingal sxs. Doubt ACS,   holdon asa given hemoptysis- unless cardiology feels strongly about continuing  - no anginal sxs. No RN concerns  -nl tele  -Echo normal  -troponin downtrending  - add on level for today  -CT shows: Prominent pericardial recess fluid extending around the ascending thoracic aorta. No thoracic adenopathy.    ? Fluid incidental finding vs possible serositis or myocarditis.     Unclear if related to troponin elevation.No clear pericarditis sxs. ekg nl.  ? Need Cardiac MRI. Maybe can be done outpatient. Cardiology consult.      Hopefully can still discharge today  Holding asa for now if ok with cardiology  Hyperlipidemia  - Hold prior to admission statin while observation status     Hypertension  - Resume prior to admission hydrochlorothiazide when dose confirmed by pharmacy      Mild cognitive impairment  Follows with Dr. Fish. Notes indicate issues with irritability, aggression towards family, social inappropriateness in addition to memory loss.   - Re-orient as needed  - Maintain normal day/night, sleep wake cycles  - Minimize sedating/altering medications as able  - Treat separate conditions as detailed above/below            Diet:   Regular diet  DVT Prophylaxis: Observation patient, short stay  anticipated   Hoff Catheter: Not present  Code Status:   Full code        Disposition Plan  [place] to observation status. Anticipate discharge today following cardiology eval         Christophe Lucia MD, MD  Text Page  (7am to 6pm)  Interval History   Not much cough. No further hemoptysis  No complaints  No cp or sob    -Data reviewed today: I reviewed all new labs and imaging results over the last 24 hours. I personally reviewed imaging and labs since admission.     Physical Exam   Temp: 97.8  F (36.6  C) Temp src: (P) Oral BP: 136/67 Pulse: 93   Resp: 18 SpO2: 94 % O2 Device: (P) None (Room air)    There were no vitals filed for this visit.  Vital Signs with Ranges  Temp:  [97.8  F (36.6  C)-98.2  F (36.8  C)] 97.8  F (36.6  C)  Pulse:  [60-93] 93  Resp:  [17-18] 18  BP: (105-136)/(52-67) 136/67  SpO2:  [93 %-95 %] 94 %  I/O last 3 completed shifts:  In: 950 [P.O.:950]  Out: -     Constitutional: Up in chair, eating, nad  Respiratory: CTAB  Slight exp wheeze  occ dry cough during exam  Cardiovascular: RRR no r/g/m  GI: soft, nt, nd  Skin/Integumen: no rash or edema  Neuro: alert, nl speech, grossly nonfocal, forgetful,  Psych: calm, cooperative      Medications       albuterol  2 puff Inhalation 4x Daily     azithromycin  250 mg Oral Daily     cefTRIAXone  1 g Intravenous Q24H     predniSONE  40 mg Oral Daily       Data   Recent Labs   Lab 05/01/23  0727 04/30/23  0726 04/29/23  1904   WBC  --  8.2 6.8   HGB  --  13.7 14.9   MCV  --  94 95   PLT  --  175 169   INR 1.12  --   --    NA  --  140 138   POTASSIUM  --  3.4 4.2   CHLORIDE  --  103 99   CO2  --  26 27   BUN  --  18.9 23.8*   CR  --  0.51 0.55   ANIONGAP  --  11 12   FLAKO  --  9.4 10.1   GLC  --  142* 116*   ALBUMIN 3.8  --  4.4   PROTTOTAL 6.1*  --  7.2   BILITOTAL 0.5  --  0.6   ALKPHOS 41  --  49   ALT 31  --  42*   AST 31  --  61*       Imaging:   Recent Results (from the past 24 hour(s))   Echocardiogram Complete   Result Value    LVEF  60-65%     [facility]    [id number]  [id number]  [id number]  [id number]^MILI^CHRISTIAN^NEDA     [facility]  Echocardiography Laboratory  [address] [place] [address]     Name: MAGGY UP  MRN: [id number]  : 1940  Study Date: 2023 01:07 PM  Age: 82 yrs  Gender: Female  Patient Location: [facility]  Reason For Study: Other, Please Specify in Comments  Ordering Physician: CHRISTIAN GARCES  Referring Physician: Alison Hernandez  Performed By: Pk Gibson     BSA: 1.5 m2  Height: 62 in  Weight: 108 lb  HR: 61  BP: 97/44 mmHg  ______________________________________________________________________________  Procedure  Complete Portable Echo Adult.  ______________________________________________________________________________  Interpretation Summary     Left ventricular systolic function is normal.  The visual ejection fraction is 60-65%.  No regional wall motion abnormalities noted.  The study was technically difficult. Compared to the prior study dated ,  there have been no changes.  ______________________________________________________________________________  Left Ventricle  The left ventricle is normal in size. There is normal left ventricular wall  thickness. Left ventricular systolic function is normal. The visual ejection  fraction is 60-65%. Grade II or moderate diastolic dysfunction. No regional  wall motion abnormalities noted.     Right Ventricle  The right ventricle is normal size. The right ventricular systolic function is  normal.     Atria  Normal left atrial size. Right atrial size is normal.     Mitral Valve  The mitral valve leaflets are mildly thickened. There is mild (1+) mitral  regurgitation.     Tricuspid Valve  There is mild (1+) tricuspid regurgitation. The right ventricular systolic  pressure is approximated at 28.6 mmHg plus the right atrial pressure. IVC  diameter and respiratory changes fall into an intermediate range suggesting an  RA pressure of 8  mmHg.     Aortic Valve  There is mild trileaflet aortic sclerosis. No aortic stenosis is present.     Pulmonic Valve  The pulmonic valve is not well seen, but is grossly normal.     Vessels  The aortic root is normal size.     Pericardium  Trivial pericardial effusion.     Rhythm  Sinus rhythm was noted. The patient exhibited occasional PACs.  ______________________________________________________________________________  MMode/2D Measurements & Calculations  IVSd: 0.87 cm     LVIDd: 3.7 cm  LVIDs: 1.9 cm  LVPWd: 0.85 cm  FS: 49.4 %  LV mass(C)d: 91.9 grams  LV mass(C)dI: 62.5 grams/m2  Ao root diam: 2.7 cm  LA dimension: 3.4 cm  asc Aorta Diam: 2.5 cm  LA/Ao: 1.2  LVOT diam: 1.8 cm  LVOT area: 2.5 cm2  LA Volume (BP): 45.1 ml  LA Volume Index (BP): 30.7 ml/m2  RWT: 0.46     Doppler Measurements & Calculations  MV E max pepe: 90.4 cm/sec  MV A max pepe: 57.8 cm/sec  MV E/A: 1.6  MV dec slope: 420.9 cm/sec2  MV dec time: 0.21 sec  PA acc time: 0.18 sec  TR max pepe: 267.4 cm/sec  TR max P.6 mmHg  E/E' av.7  Lateral E/e': 14.6  Medial E/e': 14.8  RV S Pepe: 14.0 cm/sec     ______________________________________________________________________________  Report approved by: Tomy Licea 2023 01:56 PM

## 2023-05-01 NOTE — CONSULTS
[facility]    Cardiology Consultation     Date of Admission:  4/29/2023    Assessment & Plan   Carmen Zhang is a 82 year old female who was admitted on 4/29/2023.    1. Hemoptysis, resolved  2. Bronchiolitis, likely cause of #1, improving  3. Downtrending hs-troponin, unknown peak, asymptomatic: Likely demand ischemia given recent illness  4. Prominent pericardial recess fluid, with only trivial pericardial effusion  5. Remote history of resection of benign pericardiac mass  6. Remote tobacco abuse  7. Family history of CAD      It was a pleasure to speak with Carmen Davila and her  at the bedside today.  I am glad to hear that she is feeling better at present.  In regards to her elevated high-sensitivity troponin, we discussed that I am not sure how high this may have peaked, and we cannot say for certain that this was not related to an acute coronary syndrome.  That said, I think it is much more likely that this is related to demand ischemia in the setting of her bronchiolitis/hemoptysis.  She denies any cardiac symptoms at present, and as a result I would recommend conservative management for now.  Given her minimal coronary calcification on CT scan, her normal LVEF and wall motion on echo, and lack of clear symptoms, I would recommend that she work on gradually resuming her prior exercise as an outpatient, and if she is unable to do this, that would give us a strong reason for pursuing an ischemic evaluation.  However, if she is able to return to her prior exertion, with no anginal symptoms, I would probably favor holding off on further ischemic evaluation for now, though this is something that can continue to be discussed as an outpatient.    In regards to the prominent pericardial recess fluid, this is likely an incidental discovery and I suspect relates back to her prior pericardiac mass resection.  She has no significant pericardial effusion, with no evidence of tamponade or  other major issue, and no clinical signs/symptoms of pericarditis/myocarditis, so I would not recommend further work-up of this with cardiac MRI, etc., unless new symptoms develop in the future.        -Okay for discharge today from a cardiology standpoint  -Conservative management of elevated troponin, likely due to demand ischemia  -Can discuss whether to pursue ischemic evaluation as outpatient pending development of anginal symptoms with return to exercise  -No further evaluation of prominent pericardial recess fluid for now unless new symptoms develop  -Can resume prior baby aspirin once okay per primary team from bleeding standpoint  -Continue home cardiac medications        Thank you for the interesting consult.  Cardiology will sign off at this time.  Please feel free to call back anytime with future questions or concerns.        High complexity     Sushant Mcneill MD, MD    Primary Care Physician   Alison Hernandez    Reason for Consult   Reason for consult: I was asked by Dr. Lucia to evaluate this patient for elevated troponin, abnormal CT.    History of Present Illness   Carmen Zhang is a 82 year old female who was initially admitted on 4/29/2023 with bronchiolitis and hemoptysis, following a prior URI/sinusitis for which she had 2 separate courses of antibiotics previously.  She was started on steroids and additional antibiotics and has clinically improved with no recurrent hemoptysis.  However, she did have an elevated high-sensitivity troponin on admission which was downtrending throughout her stay, as well as a note of prominent fluid in the pericardial recess on CT, so cardiology was consulted.    She has no personal history of coronary issues, but did see Dr. Carson in our clinic many years ago given her strong family history of CAD.  She also has a history of resection of an apparent pericardiac mass in her 30s, which was initially thought to be lung cancer but apparently turned out  "to be benign.  Symptomatically, she has been extremely active until just recently given her illness, and has been able to climb stairs (almost running up the stairs) without any chest discomfort, shortness of breath, or other anginal symptoms.    Work-up to this point was notable for an ECG which was normal with no ischemic changes, downtrending high-sensitivity troponin from a peak of 412 on admission, down to 96 this morning, normal renal function, and normal CBC.  She also had a CT PE protocol on 4/29/2023 which was notable from a cardiac standpoint for minimal coronary calcifications and no pulmonary embolism, but it was noted that she had \"prominent pericardial recess fluid extending around the ascending thoracic aorta\".  Her echocardiogram from yesterday I personally reviewed, and this was unremarkable with only trivial pericardial fluid.      Past Medical History   Past Medical History:   Diagnosis Date     Benign essential hypertension      Female stress incontinence      Herpes simplex without mention of complication      Hyperlipidemia      Insomnia      Lumbago 08/17/2006     MCI (mild cognitive impairment)      Meniere's disease      Osteoporosis      Other ovarian failure      Personal history of other disorders of nervous system and sense organs      Sensorineural hearing loss, unilateral          Past Surgical History   Past Surgical History:   Procedure Laterality Date     BREAST BIOPSY, RT/LT       HYSTERECTOMY, VAGINAL      ovaries left     ZZC APPENDECTOMY       ZZC NONSPECIFIC PROCEDURE  33 yrs old    removal of growth on heart      Z NONSPECIFIC PROCEDURE      bladder suspension     Z NONSPECIFIC PROCEDURE      vein stripping         Prior to Admission Medications   Prior to Admission Medications   Prescriptions Last Dose Informant Patient Reported? Taking?   ASPIRIN 81 MG OR TABS 4/29/2023 at AM Self Yes No   Sig: Take 81 mg by mouth daily    Ascorbic Acid (VITAMIN C PO) 4/29/2023 at AM  " Yes Yes   Sig: Take 1 tablet by mouth daily   Betahistine HCl (BETAHISTINE DIHYDROCHLORIDE) POWD 4/29/2023 at AM  Yes No   Sig: Take 8 mg by mouth 2 times daily   Coenzyme Q10 (COQ10 PO) 4/29/2023 at AM Self Yes Yes   Sig: TAKE 2 TEASPOONS OF QUNOL-LIQUID DAILY   Lutein-Zeaxanthin 25-5 MG CAPS 4/29/2023 at AM  Yes Yes   Sig: Take 1 capsule by mouth daily   Melatonin 10 MG TABS  at PRN Self Yes Yes   Sig: Take 10 mg by mouth nightly as needed    Omega-3 Fatty Acids (FISH OIL) 1200 MG capsule 4/29/2023 at AM Self Yes Yes   Sig: Take 1,200 mg by mouth daily   Vitamin D3 (CHOLECALCIFEROL) 125 MCG (5000 UT) tablet 4/29/2023 at AM Self Yes Yes   Sig: Take 125 mcg by mouth daily   atorvastatin (LIPITOR) 10 MG tablet 4/29/2023 at AM Self No Yes   Sig: Take 1 tablet (10 mg) by mouth daily Pt is requesting that she only receive [employer].   calcium-vitamin D (CALCIUM 600 + D) 600-400 MG-UNIT per tablet 4/29/2023 at AM Self No Yes   Sig: Take 1 tablet by mouth 2 times daily FOR BONE HEALTH AND FOR VITAMIN D DEFICIENCY (LOW VITAMIN D)   carboxymethylcellulose PF (REFRESH PLUS) 0.5 % ophthalmic solution  at PRN Self Yes Yes   Sig: Place 1 drop into both eyes 4 times daily as needed for dry eyes Uncertain of otc product   cyabnocobalamin (VITAMIN B-12) 2500 MCG sublingual tablet 4/28/2023 at AM Self No Yes   Sig: Take 2,500 mcg by mouth daily INDICATION: FOR VITAMIN B12 SUPPLEMENTATION - TO IMPROVE MEMORY, BALANCE, SLEEP AND MOOD, DIRECTIONS: PLEASE PLACE UNDER THE TONGUE TO DISSOLVE   donepezil (ARICEPT) 5 MG tablet 4/28/2023 at PM  Yes Yes   Sig: Take 5 mg by mouth At Bedtime   hydrochlorothiazide (HYDRODIURIL) 25 MG tablet 4/29/2023 at AM Self Yes Yes   Sig: Take 25 mg by mouth daily   multivitamin w/minerals (THERA-VIT-M) tablet 4/29/2023 at AM Self Yes Yes   Sig: Take 1 tablet by mouth daily   mupirocin (BACTROBAN) 2 % external ointment  at PRN  No Yes   Sig: Apply topically 3 times daily   polyethylene glycol (MIRALAX)  powder 4/29/2023 at AM Self No Yes   Sig: Take 17 g (1 capful) by mouth daily INDICATION: CONSTIPATION, TO ACHIEVE 1-2 SOFT BMs PER DAY   potassium citrate (UROCIT-K) 10 MEQ (1080 MG) CR tablet 4/29/2023 at PM  Yes Yes   Sig: Take 10 mEq by mouth 3 times daily (with meals)   vitamin E (TOCOPHEROL) 1000 units (450 mg) capsule 4/29/2023 at AM Self Yes Yes   Sig: Take 1,000 Units by mouth daily   zinc gluconate 50 MG tablet 4/29/2023 at AM Self Yes Yes   Sig: Take 50 mg by mouth daily Unsure of which zinc formulation      Facility-Administered Medications: None     Current Facility-Administered Medications   Medication Dose Route Frequency     albuterol  2 puff Inhalation 4x Daily     azithromycin  250 mg Oral Daily     cefTRIAXone  1 g Intravenous Q24H     predniSONE  40 mg Oral Daily     Current Facility-Administered Medications   Medication Last Rate     Allergies   Allergies   Allergen Reactions     Diatrizoate Hives and Itching     Dye [Contrast Dye] Hives and Itching     Iodine Hives     Seasonal Allergies        Social History    reports that she quit smoking about 62 years ago. Her smoking use included cigarettes. She has a 39.00 pack-year smoking history. She has never used smokeless tobacco. She reports that she does not drink alcohol and does not use drugs.      Family History   I have reviewed this patient's family history and updated it with pertinent information if needed.  Family History   Problem Relation Age of Onset     Cancer Mother         ESOPHAGEAL, SMOKER     RIZWANA. Mother         MI IN HER 70s     MINH Father         MI IN HIS 70s     Prostate Cancer Father      Neurologic Disorder Father         PARKINSON'S     Cerebrovascular Disease Maternal Grandmother      Blood Disease Maternal Grandfather      Diabetes Maternal Aunt      MINH Brother         11 STENTS AND BYPASS, IN HIS FIFTIES     Obesity Brother      Connective Tissue Disorder Daughter         HIP PRESENTATION     Diabetes Maternal  "Aunt      Connective Tissue Disorder Daughter         ARTHRITIS          Review of Systems   A comprehensive review of system was performed and is negative other than that noted in the HPI or here.     Physical Exam   Vital Signs with Ranges  Temp:  [97  F (36.1  C)-98.2  F (36.8  C)] 97  F (36.1  C)  Pulse:  [60-93] 87  Resp:  [17-18] 18  BP: (105-136)/(52-67) 113/56  SpO2:  [93 %-95 %] 94 %  Wt Readings from Last 4 Encounters:   05/01/23 49.4 kg (109 lb)   10/04/22 49 kg (108 lb)   11/18/21 47.6 kg (105 lb)   09/03/21 46.7 kg (103 lb)     I/O last 3 completed shifts:  In: 950 [P.O.:950]  Out: -       Vitals: /56 (BP Location: Left arm)   Pulse 87   Temp 97  F (36.1  C) (Oral)   Resp 18   Ht 1.575 m (5' 2\")   Wt 49.4 kg (109 lb)   SpO2 94%   BMI 19.94 kg/m      Physical Exam:   General - Alert and oriented to time place and person in no acute distress  Eyes - No scleral icterus  HEENT - Neck supple, moist mucous membranes  Cardiovascular -RRR, no M/R/G  Extremities - There is trace bilateral lower extremity edema  Respiratory -clear to auscultation bilaterally  Skin - No pallor or cyanosis  Gastrointestinal - Non tender and non distended without rebound or guarding  Psych - Appropriate affect   Neurological - No gross motor neurological focal deficits    No lab results found in last 7 days.    Invalid input(s): TROPONINIES    Recent Labs   Lab 05/01/23  0727 04/30/23  0726 04/29/23  1904   WBC  --  8.2 6.8   HGB  --  13.7 14.9   MCV  --  94 95   PLT  --  175 169   INR 1.12  --   --    NA  --  140 138   POTASSIUM  --  3.4 4.2   CHLORIDE  --  103 99   CO2  --  26 27   BUN  --  18.9 23.8*   CR  --  0.51 0.55   GFRESTIMATED  --  >90 90   ANIONGAP  --  11 12   FLAKO  --  9.4 10.1   GLC  --  142* 116*   ALBUMIN 3.8  --  4.4   PROTTOTAL 6.1*  --  7.2   BILITOTAL 0.5  --  0.6   ALKPHOS 41  --  49   ALT 31  --  42*   AST 31  --  61*     Recent Labs   Lab Test 08/08/17  0857 05/12/16  0844   CHOL 158 155   HDL 85 " 76   LDL 55 63   TRIG 92 78     Recent Labs   Lab 04/30/23  0726 04/29/23 1904   WBC 8.2 6.8   HGB 13.7 14.9   HCT 40.5 45.1   MCV 94 95    169     No results for input(s): PH, PHV, PO2, PO2V, SAT, PCO2, PCO2V, HCO3, HCO3V in the last 168 hours.  No results for input(s): NTBNPI, NTBNP in the last 168 hours.  Recent Labs   Lab 04/29/23  1904   DD 1.18*     No results for input(s): SED, CRP in the last 168 hours.  Recent Labs   Lab 04/30/23  0726 04/29/23  1904    169     No results for input(s): TSH in the last 168 hours.  No results for input(s): COLOR, APPEARANCE, URINEGLC, URINEBILI, URINEKETONE, SG, UBLD, URINEPH, PROTEIN, UROBILINOGEN, NITRITE, LEUKEST, RBCU, WBCU in the last 168 hours.    Imaging:  Recent Results (from the past 48 hour(s))   CT Chest Pulmonary Embolism w Contrast    Narrative    EXAM: CT CHEST PULMONARY EMBOLISM W CONTRAST  LOCATION: [facility]  DATE/TIME: 4/29/2023 8:51 PM CDT    INDICATION: hemoptysis  COMPARISON: CT chest 04/07/2019  TECHNIQUE: CT chest pulmonary angiogram during arterial phase injection of IV contrast. Multiplanar reformats and MIP reconstructions were performed. Dose reduction techniques were used.   CONTRAST: 54mL Isovue 370    FINDINGS:  ANGIOGRAM CHEST: Pulmonary arteries are normal caliber and negative for pulmonary emboli. Thoracic aorta is negative for dissection. No CT evidence of right heart strain.    LUNGS AND PLEURA: Mild layering low-density fluid/secretions within the right mainstem bronchus. Mild bronchial wall thickening. Multifocal parenchymal scarring. No focal consolidation or pleural effusion. Mosaic lung attenuation suggesting air trapping.   Stable benign 14 mm lingular subpleural nodule, unchanged since 04/07/2019.    MEDIASTINUM/AXILLAE: Prominent pericardial recess fluid extending around the ascending thoracic aorta. No thoracic adenopathy. Borderline cardiomegaly. No pericardial effusion.    CORONARY ARTERY  CALCIFICATION: Minimal    UPPER ABDOMEN: Partially visualized left renal cysts. No follow-up is indicated.    MUSCULOSKELETAL: Normal.      Impression    IMPRESSION:  1.  No pulmonary artery embolism.  2.  Findings suggesting bronchiolitis including bronchial wall thickening and pulmonary air trapping. No pneumonic infiltrate or pleural effusion.  3.  Stable benign 14 mm lingular nodule, unchanged since 2019.   Echocardiogram Complete   Result Value    LVEF  60-65%    [facility]    [id number]  [id number]  [id number]  [id number]^MILI^CHRISTIAN^NEDA     [facility]  Echocardiography Laboratory  [address] [place] [id number]     Name: MAGGY UP  MRN: [id number]  : 1940  Study Date: 2023 01:07 PM  Age: 82 yrs  Gender: Female  Patient Location: [facility]  Reason For Study: Other, Please Specify in Comments  Ordering Physician: CHRISTIAN GARCES  Referring Physician: Alison Hernandez  Performed By: kP Gibson     BSA: 1.5 m2  Height: 62 in  Weight: 108 lb  HR: 61  BP: 97/44 mmHg  ______________________________________________________________________________  Procedure  Complete Portable Echo Adult.  ______________________________________________________________________________  Interpretation Summary     Left ventricular systolic function is normal.  The visual ejection fraction is 60-65%.  No regional wall motion abnormalities noted.  The study was technically difficult. Compared to the prior study dated ,  there have been no changes.  ______________________________________________________________________________  Left Ventricle  The left ventricle is normal in size. There is normal left ventricular wall  thickness. Left ventricular systolic function is normal. The visual ejection  fraction is 60-65%. Grade II or moderate diastolic dysfunction. No regional  wall motion abnormalities noted.     Right Ventricle  The right ventricle is normal size. The right  ventricular systolic function is  normal.     Atria  Normal left atrial size. Right atrial size is normal.     Mitral Valve  The mitral valve leaflets are mildly thickened. There is mild (1+) mitral  regurgitation.     Tricuspid Valve  There is mild (1+) tricuspid regurgitation. The right ventricular systolic  pressure is approximated at 28.6 mmHg plus the right atrial pressure. IVC  diameter and respiratory changes fall into an intermediate range suggesting an  RA pressure of 8 mmHg.     Aortic Valve  There is mild trileaflet aortic sclerosis. No aortic stenosis is present.     Pulmonic Valve  The pulmonic valve is not well seen, but is grossly normal.     Vessels  The aortic root is normal size.     Pericardium  Trivial pericardial effusion.     Rhythm  Sinus rhythm was noted. The patient exhibited occasional PACs.  ______________________________________________________________________________  MMode/2D Measurements & Calculations  IVSd: 0.87 cm     LVIDd: 3.7 cm  LVIDs: 1.9 cm  LVPWd: 0.85 cm  FS: 49.4 %  LV mass(C)d: 91.9 grams  LV mass(C)dI: 62.5 grams/m2  Ao root diam: 2.7 cm  LA dimension: 3.4 cm  asc Aorta Diam: 2.5 cm  LA/Ao: 1.2  LVOT diam: 1.8 cm  LVOT area: 2.5 cm2  LA Volume (BP): 45.1 ml  LA Volume Index (BP): 30.7 ml/m2  RWT: 0.46     Doppler Measurements & Calculations  MV E max pepe: 90.4 cm/sec  MV A max pepe: 57.8 cm/sec  MV E/A: 1.6  MV dec slope: 420.9 cm/sec2  MV dec time: 0.21 sec  PA acc time: 0.18 sec  TR max pepe: 267.4 cm/sec  TR max P.6 mmHg  E/E' av.7  Lateral E/e': 14.6  Medial E/e': 14.8  RV S Pepe: 14.0 cm/sec     ______________________________________________________________________________  Report approved by: Tomy Licea 2023 01:56 PM             Echo:  No results found for this or any previous visit (from the past 4320 hour(s)).    Clinically Significant Risk Factors Present on Admission                        Limitations of activities/Fatigue (optional): Chronic  Fatigue and Other Debilities: Age-related physical debility

## 2023-05-01 NOTE — PROGRESS NOTES
Observation goals  PRIOR TO DISCHARGE       Comments:   -diagnostic tests and consults completed and resulted- Partially Met    -vital signs normal or at patient baseline- Met    -safe disposition plan has been identified- Not met

## 2023-05-01 NOTE — PLAN OF CARE
Goal Outcome Evaluation:  Orientation/Cognitive: A&Ox4, Forgetful   Observation Goals (Met/ Not Met): Partially met   Mobility Level/Assist Equipment: SBA with walker   Fall Risk (Y/N): Yes   Behavior Concerns: None   Pain Management: Denies pain  Tele/VS/O2: Tele- SR, VSS on RA   ABNL Lab/BG: Troponin- 124 (trending down)   Diet: Regular  Bowel/Bladder: Continent   Skin Concerns: BUE bruising forearms  Drains/Devices: PIV SL  Tests/Procedures for next shift: None   Anticipated DC date & active delays: Possibly today if no fresh active bleeding (see pulmonology note)       Observation goals  PRIOR TO DISCHARGE       Comments:   -diagnostic tests and consults completed and resulted- Partially Met    -vital signs normal or at patient baseline- Met    -safe disposition plan has been identified- Not met

## 2023-05-01 NOTE — PLAN OF CARE
Orientation/Cognitive: A&O  Observation Goals (Met/ Not Met): Met  Mobility Level/Assist Equipment: SBA/indep w  Fall Risk (Y/N): Y  Behavior Concerns: N  Pain Management: denies  Tele/VS/O2: Tele NS VSS on RA  ABNL Lab/BG: trop trending down  Diet: reg  Bowel/Bladder: cont  Skin Concerns: no  Drains/Devices: no  Tests/Procedures for next shift: no  Anticipated DC date & active delays: 5-1   Patient Stated Goal for Today: rest  AVS reviewed. Pt in agreeement. Meds given. Discharged home w .

## 2023-05-05 LAB
BACTERIA BLD CULT: NO GROWTH
BACTERIA BLD CULT: NO GROWTH

## 2023-05-22 ENCOUNTER — HOSPITAL ENCOUNTER (OUTPATIENT)
Dept: CT IMAGING | Facility: CLINIC | Age: 83
Discharge: HOME | End: 2023-05-22
Attending: FAMILY MEDICINE | Admitting: FAMILY MEDICINE
Payer: MEDICARE

## 2023-05-22 DIAGNOSIS — R04.2 HEMOPTYSIS: ICD-10-CM

## 2023-05-22 PROCEDURE — 250N000011 HC RX IP 250 OP 636: Performed by: RADIOLOGY

## 2023-05-22 PROCEDURE — 71260 CT THORAX DX C+: CPT

## 2023-05-22 PROCEDURE — 250N000009 HC RX 250: Performed by: RADIOLOGY

## 2023-05-22 RX ORDER — IOPAMIDOL 755 MG/ML
53 INJECTION, SOLUTION INTRAVASCULAR ONCE
Status: COMPLETED | OUTPATIENT
Start: 2023-05-22 | End: 2023-05-22

## 2023-05-22 RX ADMIN — IOPAMIDOL 53 ML: 755 INJECTION, SOLUTION INTRAVENOUS at 14:45

## 2023-05-22 RX ADMIN — SODIUM CHLORIDE 55 ML: 9 INJECTION, SOLUTION INTRAVENOUS at 14:44

## 2024-11-03 ENCOUNTER — HOSPITAL ENCOUNTER (EMERGENCY)
Facility: CLINIC | Age: 84
Discharge: HOME | End: 2024-11-03
Attending: STUDENT IN AN ORGANIZED HEALTH CARE EDUCATION/TRAINING PROGRAM | Admitting: STUDENT IN AN ORGANIZED HEALTH CARE EDUCATION/TRAINING PROGRAM
Payer: MEDICARE

## 2024-11-03 VITALS
WEIGHT: 103 LBS | HEART RATE: 66 BPM | RESPIRATION RATE: 18 BRPM | HEIGHT: 62 IN | TEMPERATURE: 97.3 F | SYSTOLIC BLOOD PRESSURE: 162 MMHG | BODY MASS INDEX: 18.95 KG/M2 | DIASTOLIC BLOOD PRESSURE: 86 MMHG | OXYGEN SATURATION: 97 %

## 2024-11-03 DIAGNOSIS — L03.116 CELLULITIS OF LEFT LOWER EXTREMITY: Primary | ICD-10-CM

## 2024-11-03 DIAGNOSIS — Z51.89 VISIT FOR WOUND CHECK: ICD-10-CM

## 2024-11-03 PROBLEM — M77.8 RIGHT SHOULDER TENDONITIS: Status: ACTIVE | Noted: 2020-01-20

## 2024-11-03 PROBLEM — F03.918 DEMENTIA WITH BEHAVIORAL DISTURBANCE (H): Status: ACTIVE | Noted: 2023-01-16

## 2024-11-03 PROBLEM — G30.9 ALZHEIMERS DISEASE (H): Status: ACTIVE | Noted: 2024-04-23

## 2024-11-03 PROBLEM — F02.80 FRONTOTEMPORAL DEMENTIA (H): Status: ACTIVE | Noted: 2023-01-16

## 2024-11-03 PROBLEM — G31.09 FRONTOTEMPORAL DEMENTIA (H): Status: ACTIVE | Noted: 2023-01-16

## 2024-11-03 PROBLEM — F02.80 ALZHEIMERS DISEASE (H): Status: ACTIVE | Noted: 2024-04-23

## 2024-11-03 PROBLEM — R63.4 ABNORMAL WEIGHT LOSS: Status: ACTIVE | Noted: 2021-06-29

## 2024-11-03 PROBLEM — I83.893 VARICOSE VEINS OF BILATERAL LOWER EXTREMITIES WITH OTHER COMPLICATIONS: Status: ACTIVE | Noted: 2021-06-29

## 2024-11-03 PROBLEM — M75.21 BICEPS TENDINITIS OF RIGHT UPPER EXTREMITY: Status: ACTIVE | Noted: 2020-01-20

## 2024-11-03 PROBLEM — H90.3 ASYMMETRIC SNHL (SENSORINEURAL HEARING LOSS): Status: ACTIVE | Noted: 2022-01-27

## 2024-11-03 PROCEDURE — 99283 EMERGENCY DEPT VISIT LOW MDM: CPT

## 2024-11-03 PROCEDURE — 76882 US LMTD JT/FCL EVL NVASC XTR: CPT

## 2024-11-03 RX ORDER — CEPHALEXIN 500 MG/1
500 CAPSULE ORAL 2 TIMES DAILY
Qty: 14 CAPSULE | Refills: 0 | Status: SHIPPED | OUTPATIENT
Start: 2024-11-03 | End: 2024-11-10

## 2024-11-03 ASSESSMENT — ACTIVITIES OF DAILY LIVING (ADL)
ADLS_ACUITY_SCORE: 0
ADLS_ACUITY_SCORE: 0

## 2024-11-03 ASSESSMENT — COLUMBIA-SUICIDE SEVERITY RATING SCALE - C-SSRS
2. HAVE YOU ACTUALLY HAD ANY THOUGHTS OF KILLING YOURSELF IN THE PAST MONTH?: NO
1. IN THE PAST MONTH, HAVE YOU WISHED YOU WERE DEAD OR WISHED YOU COULD GO TO SLEEP AND NOT WAKE UP?: NO
6. HAVE YOU EVER DONE ANYTHING, STARTED TO DO ANYTHING, OR PREPARED TO DO ANYTHING TO END YOUR LIFE?: NO

## 2024-11-03 NOTE — ED TRIAGE NOTES
Left lateral shin area with wound from bumping into something a week ago. Since been self treating with antibiotic ointment and had liquid bandaid over, but not getting better. Now redness expanding around the site, teresa wound skin  is warm to touch. Worried it is infected.      Triage Assessment (Adult)       Row Name 11/03/24 1059          Triage Assessment    Airway WDL WDL        Respiratory WDL    Respiratory WDL WDL        Skin Circulation/Temperature WDL    Skin Circulation/Temperature WDL X        Cardiac WDL    Cardiac WDL WDL        Peripheral/Neurovascular WDL    Peripheral Neurovascular WDL WDL        Cognitive/Neuro/Behavioral WDL    Cognitive/Neuro/Behavioral WDL WDL

## 2024-11-03 NOTE — ED PROVIDER NOTES
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  The patient bumped into a piece of furniture approximately a week ago and sustained an abrasion to the anterior left shin.  She has been managing it with antibiotic ointment and a liquid Band-Aid.  Today, she notices more redness around the wound and the skin feels warm to touch.  She is concerned about infection.  No fever or chills.  No drainage.  She is otherwise feeling well.  Patient takes aspirin but no anticoagulation.    Independent Historian   None    Review of External Notes   I personally reviewed notes from the patient's clinic visit dated  8/19/2024 . This provided me with information regarding patient's baseline medical problems.     I personally reviewed the patient's chart, including available medication list and available past medical history, past surgical history, family history, and social history.    Physical Exam     Patient Vitals for the past 24 hrs:   BP Temp Temp src Pulse Resp SpO2 Height Weight   11/03/24 1058 (!) 162/86 97.3  F (36.3  C) Temporal 66 18 97 % 1.575 m (5' 2\") 46.7 kg (103 lb)     Physical Exam  Vitals and nursing note reviewed.   Constitutional:       Appearance: Normal appearance. She is not ill-appearing or diaphoretic.   Cardiovascular:      Rate and Rhythm: Normal rate.   Skin:     General: Skin is warm and dry.      Findings: Erythema (There is erythema surrounding skin wound, with warmth and mild tenderness to palpation) and lesion (There is a wound on the central left shin, approximately 3 mm in diameter,) present.   Neurological:      Mental Status: She is alert and oriented to person, place, and time.      Sensory: No sensory deficit.      Motor: No weakness.            Diagnostics "     Lab Results   Labs Ordered and Resulted from Time of ED Arrival to Time of ED Departure - No data to display    Imaging   POC US SOFT TISSUE   Final Result     Soft Tissue Ultrasound      Procedure Name: POC Ultrasound Soft Tissue Exam      Indication: Erythema and Pain      Views: Skin and Subcutaneous tissue location: anterior left shin      Findings: No fluid collection and No cobblestoning      Impression: No evidence of abscess      Study performed by: EULALIO RIVERA MD       Images archived: Yes             EKG   No ECG performed.     Independent Interpretation - See ED Course Below    ED Course      Medications Administered   Medications - No data to display    Procedures   Procedures   None performed    Discussion of Management - See ED Course Below    ED Course   Independent Interpretation / Discussion of Management / Repeat Assessments       Additional Documentation  None    Medical Decision Making / Diagnosis     CMS Diagnoses: None    MIPS       None    MDM   Patient presenting with wound check.  Vital signs are reassuring.  From the patient's left shin wound that does appear to be mild erythema and warmth with mild tenderness to palpation.  Clinically this does appear consistent with cellulitis.  There is no purulent drainage.  I did form point care ultrasound to evaluate for abscess and there was no fluid collection.  Patient is well-appearing, normal vital signs, no fever or chills, so I doubt systemic illness or abscess physiology.  Will plan for treatment at home with cephalexin, primary care follow-up in 1 week for reevaluation.  Return precautions provided.    Disposition   The patient was discharged.     Diagnosis     ICD-10-CM    1. Cellulitis of left lower extremity  L03.116       2. Visit for wound check  Z51.89            Discharge Medications   Discharge Medication List as of 11/3/2024 12:03 PM        START taking these medications    Details   cephALEXin (KEFLEX) 500 MG capsule Take  1 capsule (500 mg) by mouth 2 times daily for 7 days., Disp-14 capsule, R-0, E-Prescribe              Vazquez Streeter MD  11/03/24 5843

## 2024-11-14 ENCOUNTER — OFFICE VISIT (OUTPATIENT)
Dept: URGENT CARE | Facility: URGENT CARE | Age: 84
End: 2024-11-14
Payer: MEDICARE

## 2024-11-14 VITALS
WEIGHT: 105 LBS | HEART RATE: 78 BPM | TEMPERATURE: 98.2 F | BODY MASS INDEX: 19.2 KG/M2 | RESPIRATION RATE: 19 BRPM | OXYGEN SATURATION: 96 %

## 2024-11-14 DIAGNOSIS — T16.2XXA FOREIGN BODY OF LEFT EAR, INITIAL ENCOUNTER: Primary | ICD-10-CM

## 2024-11-14 PROCEDURE — 69200 CLEAR OUTER EAR CANAL: CPT | Mod: LT | Performed by: PHYSICIAN ASSISTANT

## 2024-11-14 PROCEDURE — 99207 PR NO CHARGE LOS: CPT | Performed by: PHYSICIAN ASSISTANT

## 2024-11-14 NOTE — PROGRESS NOTES
Assessment & Plan     Foreign body of left ear, initial encounter    Foreign body left outer ear canal    PROCEDURE  Left ear foreign body was removed with irrigation   Foreign body removed and pt tolerated procedure  - REMOVE FB, EXT AUDITORY CANAL       No follow-ups on file.    Jacob Vergara, [facility], PA-C  [facility]ENT [facility]     Carmen Davila is a 84 year old female who presents to clinic today for the following health issues:  Chief Complaint   Patient presents with    Ear Problem     Hearing aid stuck in the left ear        HPI  Review of Systems  Constitutional, HEENT, cardiovascular, pulmonary, gi and gu systems are negative, except as otherwise noted.      Objective    Pulse 78   Temp 98.2  F (36.8  C) (Oral)   Resp 19   Wt 47.6 kg (105 lb)   SpO2 96%   BMI 19.20 kg/m    Physical Exam   GENERAL: alert and no distress  EYES: Eyes grossly normal to inspection, PERRL and conjunctivae and sclerae normal  HENT: normal cephalic/atraumatic, right ear: normal: no effusions, no erythema, normal landmarks, left ear: small foreign body left outer ear canal, nose and mouth without ulcers or lesions, oropharynx clear, and oral mucous membranes moist  NECK: no adenopathy, no asymmetry, masses, or scars  MS: no gross musculoskeletal defects noted, no edema  SKIN: no suspicious lesions or rashes  NEURO: Normal strength and tone, mentation intact and speech normal  PSYCH: mentation appears normal, affect normal/bright

## 2024-12-11 ENCOUNTER — HOSPITAL ENCOUNTER (EMERGENCY)
Facility: CLINIC | Age: 84
Discharge: HOME | End: 2024-12-11
Attending: STUDENT IN AN ORGANIZED HEALTH CARE EDUCATION/TRAINING PROGRAM
Payer: MEDICARE

## 2024-12-11 ENCOUNTER — APPOINTMENT (OUTPATIENT)
Dept: CT IMAGING | Facility: CLINIC | Age: 84
End: 2024-12-11
Attending: STUDENT IN AN ORGANIZED HEALTH CARE EDUCATION/TRAINING PROGRAM
Payer: MEDICARE

## 2024-12-11 VITALS
TEMPERATURE: 97.7 F | WEIGHT: 103 LBS | HEIGHT: 62 IN | HEART RATE: 90 BPM | SYSTOLIC BLOOD PRESSURE: 154 MMHG | DIASTOLIC BLOOD PRESSURE: 85 MMHG | RESPIRATION RATE: 16 BRPM | OXYGEN SATURATION: 97 % | BODY MASS INDEX: 18.95 KG/M2

## 2024-12-11 DIAGNOSIS — S01.01XA SCALP LACERATION, INITIAL ENCOUNTER: ICD-10-CM

## 2024-12-11 DIAGNOSIS — S00.03XA SCALP HEMATOMA, INITIAL ENCOUNTER: ICD-10-CM

## 2024-12-11 DIAGNOSIS — S09.90XA INJURY OF HEAD, INITIAL ENCOUNTER: ICD-10-CM

## 2024-12-11 LAB
ALBUMIN SERPL BCG-MCNC: 4.3 G/DL (ref 3.5–5.2)
ALP SERPL-CCNC: 65 U/L (ref 40–150)
ALT SERPL W P-5'-P-CCNC: 29 U/L (ref 0–50)
ANION GAP SERPL CALCULATED.3IONS-SCNC: 14 MMOL/L (ref 7–15)
AST SERPL W P-5'-P-CCNC: 33 U/L (ref 0–45)
BASOPHILS # BLD AUTO: 0 10E3/UL (ref 0–0.2)
BASOPHILS NFR BLD AUTO: 0 %
BILIRUB SERPL-MCNC: 0.7 MG/DL
BUN SERPL-MCNC: 16.2 MG/DL (ref 8–23)
CALCIUM SERPL-MCNC: 10.3 MG/DL (ref 8.8–10.4)
CHLORIDE SERPL-SCNC: 95 MMOL/L (ref 98–107)
CREAT SERPL-MCNC: 0.66 MG/DL (ref 0.51–0.95)
EGFRCR SERPLBLD CKD-EPI 2021: 85 ML/MIN/1.73M2
EOSINOPHIL # BLD AUTO: 0 10E3/UL (ref 0–0.7)
EOSINOPHIL NFR BLD AUTO: 0 %
ERYTHROCYTE [DISTWIDTH] IN BLOOD BY AUTOMATED COUNT: 13 % (ref 10–15)
GLUCOSE SERPL-MCNC: 138 MG/DL (ref 70–99)
HCO3 SERPL-SCNC: 28 MMOL/L (ref 22–29)
HCT VFR BLD AUTO: 44.5 % (ref 35–47)
HGB BLD-MCNC: 14.6 G/DL (ref 11.7–15.7)
IMM GRANULOCYTES # BLD: 0 10E3/UL
IMM GRANULOCYTES NFR BLD: 0 %
LYMPHOCYTES # BLD AUTO: 0.9 10E3/UL (ref 0.8–5.3)
LYMPHOCYTES NFR BLD AUTO: 12 %
MCH RBC QN AUTO: 30.9 PG (ref 26.5–33)
MCHC RBC AUTO-ENTMCNC: 32.8 G/DL (ref 31.5–36.5)
MCV RBC AUTO: 94 FL (ref 78–100)
MONOCYTES # BLD AUTO: 0.7 10E3/UL (ref 0–1.3)
MONOCYTES NFR BLD AUTO: 10 %
NEUTROPHILS # BLD AUTO: 5.9 10E3/UL (ref 1.6–8.3)
NEUTROPHILS NFR BLD AUTO: 78 %
NRBC # BLD AUTO: 0 10E3/UL
NRBC BLD AUTO-RTO: 0 /100
PLATELET # BLD AUTO: 154 10E3/UL (ref 150–450)
POTASSIUM SERPL-SCNC: 4 MMOL/L (ref 3.4–5.3)
PROT SERPL-MCNC: 7.2 G/DL (ref 6.4–8.3)
RBC # BLD AUTO: 4.73 10E6/UL (ref 3.8–5.2)
SODIUM SERPL-SCNC: 137 MMOL/L (ref 135–145)
WBC # BLD AUTO: 7.6 10E3/UL (ref 4–11)

## 2024-12-11 PROCEDURE — 70450 CT HEAD/BRAIN W/O DYE: CPT | Mod: ME

## 2024-12-11 PROCEDURE — 12001 RPR S/N/AX/GEN/TRNK 2.5CM/<: CPT

## 2024-12-11 PROCEDURE — 250N000013 HC RX MED GY IP 250 OP 250 PS 637: Performed by: STUDENT IN AN ORGANIZED HEALTH CARE EDUCATION/TRAINING PROGRAM

## 2024-12-11 PROCEDURE — 82565 ASSAY OF CREATININE: CPT | Performed by: STUDENT IN AN ORGANIZED HEALTH CARE EDUCATION/TRAINING PROGRAM

## 2024-12-11 PROCEDURE — 99284 EMERGENCY DEPT VISIT MOD MDM: CPT | Mod: 25

## 2024-12-11 PROCEDURE — 85004 AUTOMATED DIFF WBC COUNT: CPT | Performed by: STUDENT IN AN ORGANIZED HEALTH CARE EDUCATION/TRAINING PROGRAM

## 2024-12-11 PROCEDURE — G1010 CDSM STANSON: HCPCS

## 2024-12-11 PROCEDURE — 250N000009 HC RX 250: Performed by: STUDENT IN AN ORGANIZED HEALTH CARE EDUCATION/TRAINING PROGRAM

## 2024-12-11 PROCEDURE — 82947 ASSAY GLUCOSE BLOOD QUANT: CPT | Performed by: STUDENT IN AN ORGANIZED HEALTH CARE EDUCATION/TRAINING PROGRAM

## 2024-12-11 PROCEDURE — 36415 COLL VENOUS BLD VENIPUNCTURE: CPT | Performed by: STUDENT IN AN ORGANIZED HEALTH CARE EDUCATION/TRAINING PROGRAM

## 2024-12-11 RX ORDER — TRANEXAMIC ACID 100 MG/ML
INJECTION, SOLUTION INTRAVENOUS ONCE
Status: COMPLETED | OUTPATIENT
Start: 2024-12-11 | End: 2024-12-11

## 2024-12-11 RX ORDER — IBUPROFEN 600 MG/1
600 TABLET, FILM COATED ORAL ONCE
Status: COMPLETED | OUTPATIENT
Start: 2024-12-11 | End: 2024-12-11

## 2024-12-11 RX ADMIN — IBUPROFEN 600 MG: 600 TABLET ORAL at 21:44

## 2024-12-11 RX ADMIN — TRANEXAMIC ACID: 1 INJECTION, SOLUTION INTRAVENOUS at 23:00

## 2024-12-11 ASSESSMENT — ACTIVITIES OF DAILY LIVING (ADL)
ADLS_ACUITY_SCORE: 55
ADLS_ACUITY_SCORE: 55

## 2024-12-12 NOTE — DISCHARGE INSTRUCTIONS
I would recommend keeping the dressing in place for the next 2 days.  After this, exchanged dressing by applying new gauze and wrapping head and wrapping to ensure this stays on.  Do this for the following 2 to 3 days.  Follow-up with primary care doctor in 10 days for staple removal.    If she develops any red flag symptoms in the setting of head injury such as confusion, debilitating headaches, severe nausea and vomiting, or develops worsening bleeding that cannot be controlled at home from the wound, please return to ER.

## 2024-12-12 NOTE — ED TRIAGE NOTES
Patient fell on ice, hit back of head on ground. Large laceration and hematoma to back of head. Denies LOC, takes baby aspirin daily.      Triage Assessment (Adult)       Row Name 12/11/24 9774          Triage Assessment    Airway WDL WDL        Respiratory WDL    Respiratory WDL WDL        Cardiac WDL    Cardiac WDL X  hypertension        Peripheral/Neurovascular WDL    Peripheral Neurovascular WDL WDL        Cognitive/Neuro/Behavioral WDL    Cognitive/Neuro/Behavioral WDL WDL

## 2024-12-12 NOTE — ED PROVIDER NOTES
Emergency Department Note      History of Present Illness     Chief Complaint   Fall and Head Laceration      HPI   Carmen Zhang is a 84 year old female with a history of hypertension, Ménière's disease Alzheimer's who presents with scalp hematoma after mechanical fall at home.  Patient reports that she was getting out of her car when she slipped on the ice and fell backwards, hitting the back of her head.  She denies loss of consciousness.  Has been assisted her upright.  She denies any pain to bilateral upper and lower extremities, neck, or back.   thinks that bleeding site is in the direct center of hematoma.  Patient has not had anything for pain prior to arrival and endorses a headache of 7 out of 10 in severity.  Denies any nausea or vomiting.   states mentation has been normal.  Patient takes daily aspirin.    Independent Historian    as detailed above.    Review of External Notes   None    Past Medical History     Medical History and Problem List   Past Medical History:   Diagnosis Date    Benign essential hypertension     Female stress incontinence     Herpes simplex without mention of complication     Hyperlipidemia     Insomnia     Lumbago 08/17/2006    MCI (mild cognitive impairment)     Meniere's disease     Osteoporosis     Other ovarian failure     Personal history of other disorders of nervous system and sense organs     Sensorineural hearing loss, unilateral        Medications   albuterol (PROAIR HFA/PROVENTIL HFA/VENTOLIN HFA) 108 (90 Base) MCG/ACT inhaler  Ascorbic Acid (VITAMIN C PO)  atorvastatin (LIPITOR) 10 MG tablet  calcium-vitamin D (CALCIUM 600 + D) 600-400 MG-UNIT per tablet  carboxymethylcellulose PF (REFRESH PLUS) 0.5 % ophthalmic solution  Coenzyme Q10 (COQ10 PO)  cyabnocobalamin (VITAMIN B-12) 2500 MCG sublingual tablet  donepezil (ARICEPT) 5 MG tablet  hydrochlorothiazide (HYDRODIURIL) 25 MG tablet  Lutein-Zeaxanthin 25-5 MG CAPS  Melatonin 10 MG  "TABS  multivitamin w/minerals (THERA-VIT-M) tablet  mupirocin (BACTROBAN) 2 % external ointment  Omega-3 Fatty Acids (FISH OIL) 1200 MG capsule  polyethylene glycol (MIRALAX) powder  potassium citrate (UROCIT-K) 10 MEQ (1080 MG) CR tablet  predniSONE (DELTASONE) 10 MG tablet  Vitamin D3 (CHOLECALCIFEROL) 125 MCG (5000 UT) tablet  zinc gluconate 50 MG tablet        Surgical History   Past Surgical History:   Procedure Laterality Date    BREAST BIOPSY, RT/LT      HYSTERECTOMY, VAGINAL      ovaries left    Z APPENDECTOMY      [facility] NONSPECIFIC PROCEDURE  33 yrs old    removal of growth on heart     Z NONSPECIFIC PROCEDURE      bladder suspension    Z NONSPECIFIC PROCEDURE      vein stripping       Physical Exam     Patient Vitals for the past 24 hrs:   BP Temp Pulse Resp SpO2 Height Weight   12/11/24 2105 -- 97.7  F (36.5  C) -- -- -- -- --   12/11/24 2103 (!) 154/85 -- 90 16 97 % 1.575 m (5' 2\") 46.7 kg (103 lb)     Physical Exam  General: Alert and cooperative with exam. Patient in no apparent distress. Normal mentation.  Head:  Large hematoma to posterior scalp with significant dried blood present.  After thorough irrigation, small laceration noted to left aspect of scalp hematoma.   Eyes:  No scleral icterus, PERRL  ENT:  The external nose and ears are normal.   Neck:  Normal range of motion without rigidity.  No C-spine tenderness to palpation  CV:  Regular rate and rhythm    No pathologic murmur   Resp:  Breath sounds are clear bilaterally    Non-labored, no retractions or accessory muscle use  GI:  Abdomen is soft, no distension, no tenderness. No peritoneal signs  MS:  Normal range of motion of bilateral upper and lower extremities.  No thoracic or lumbar spinal tenderness to palpation  Skin:  Warm and dry, No rash or lesions noted.  Neuro:  Oriented x 3. No gross motor deficits.      Diagnostics     Lab Results   Labs Ordered and Resulted from Time of ED Arrival to Time of ED Departure   COMPREHENSIVE " METABOLIC PANEL - Abnormal       Result Value    Sodium 137      Potassium 4.0      Carbon Dioxide (CO2) 28      Anion Gap 14      Urea Nitrogen 16.2      Creatinine 0.66      GFR Estimate 85      Calcium 10.3      Chloride 95 (*)     Glucose 138 (*)     Alkaline Phosphatase 65      AST 33      ALT 29      Protein Total 7.2      Albumin 4.3      Bilirubin Total 0.7     CBC WITH PLATELETS AND DIFFERENTIAL    WBC Count 7.6      RBC Count 4.73      Hemoglobin 14.6      Hematocrit 44.5      MCV 94      MCH 30.9      MCHC 32.8      RDW 13.0      Platelet Count 154      % Neutrophils 78      % Lymphocytes 12      % Monocytes 10      % Eosinophils 0      % Basophils 0      % Immature Granulocytes 0      NRBCs per 100 WBC 0      Absolute Neutrophils 5.9      Absolute Lymphocytes 0.9      Absolute Monocytes 0.7      Absolute Eosinophils 0.0      Absolute Basophils 0.0      Absolute Immature Granulocytes 0.0      Absolute NRBCs 0.0         Imaging   Head CT w/o contrast   Final Result   IMPRESSION:   1.  No acute intracranial process.   2.  Left parietal scalp hematoma without underlying fracture.        Independent Interpretation   None    ED Course      Medications Administered   Medications   ibuprofen (ADVIL/MOTRIN) tablet 600 mg (600 mg Oral $Given 12/11/24 6637)   tranexamic acid (CYKLOKAPRON) TOPICAL (injection used topically) ( Topical $Given by Other 12/11/24 0752)       Procedures     Laceration Repair      Procedure: Laceration Repair    Indication: Laceration    Consent: Verbal    Tetanus status reviewed and up to date    Location: Posterior scalp    Length: 0.5 cm    Preparation: Irrigation with Sterile Saline and Pressure device utilized.    Anesthesia/Sedation: None      Treatment/Exploration: Wound explored, no foreign bodies found     Closure: The wound was closed with  3 staples.    Patient Status: The patient tolerated the procedure well: Yes. There were no complications.      Discussion of Management    None    ED Course        Additional Documentation  None    Medical Decision Making / Diagnosis     CMS Diagnoses: None    MIPS       None    [facility]   Carmen Zhang is a 84 year old female who presented after mechanical fall when she slipped on the ice and hit the back of her head.  No loss of consciousness and patient was able to get herself up with the assist of her .  On exam, large hematoma noted to the posterior scalp with significant dried blood present.  After copious cleaning, small laceration noted to the left aspect of hematoma.  I was able to manually extract large blood clots within hematoma which patient tolerated quite well however did have significant bleeding in the process of this.  Pressure dressing was applied and patient sent to head CT.  Thankfully no concerns for intracranial bleed or skull fracture.  She does not have any neck pain and C-spine was cleared at the bedside.  No other injuries on head to toe exam.  Pressure dressing removed and several staples applied.  Bleeding continued, TXA applied along with repeat pressure dressing.  On reassessment, bleeding has resolved.  Clean dressing was applied instructed patient to keep this on for the next 24 to 48 hours to ensure wound clots while.  Wound dressing supplies were provided for home.  Her tetanus is up-to-date.  Discussed with  and patient red flag symptoms to monitor for in the setting of head injury including increased confusion, debilitating headaches, severe nausea and vomiting, or other acute concerns.  If these were to develop, immediately return to ER.  If she develops rebleeding that cannot be controlled at home, return to ER as well.  All questions answered prior to discharge.  I recommend close follow-up with primary care provider within the week for reassessment and for staple removal.    Disposition   The patient was discharged.     Diagnosis     ICD-10-CM    1. Injury of head, initial encounter  S09.90XA        2. Scalp laceration, initial encounter  S01.01XA       3. Scalp hematoma, initial encounter  S00.03XA            Discharge Medications   Discharge Medication List as of 12/11/2024 11:15 PM            ALEJANDRO Salinas Lauren R, PA-C  12/12/24 1524

## 2024-12-13 ENCOUNTER — HOSPITAL ENCOUNTER (OUTPATIENT)
Facility: CLINIC | Age: 84
Setting detail: OBSERVATION
Discharge: NURSING, REHAB OR LONG-TERM CARE | End: 2024-12-18
Attending: EMERGENCY MEDICINE | Admitting: HOSPITALIST
Payer: MEDICARE

## 2024-12-13 ENCOUNTER — APPOINTMENT (OUTPATIENT)
Dept: GENERAL RADIOLOGY | Facility: CLINIC | Age: 84
End: 2024-12-13
Attending: PHYSICIAN ASSISTANT
Payer: MEDICARE

## 2024-12-13 ENCOUNTER — APPOINTMENT (OUTPATIENT)
Dept: CT IMAGING | Facility: CLINIC | Age: 84
End: 2024-12-13
Attending: EMERGENCY MEDICINE
Payer: MEDICARE

## 2024-12-13 DIAGNOSIS — I48.4 ATYPICAL ATRIAL FLUTTER (H): Primary | ICD-10-CM

## 2024-12-13 DIAGNOSIS — R52 PAIN: ICD-10-CM

## 2024-12-13 DIAGNOSIS — E55.9 VITAMIN D DEFICIENCY: ICD-10-CM

## 2024-12-13 DIAGNOSIS — R53.1 WEAKNESS GENERALIZED: ICD-10-CM

## 2024-12-13 DIAGNOSIS — I48.19 PERSISTENT ATRIAL FIBRILLATION (H): ICD-10-CM

## 2024-12-13 DIAGNOSIS — I48.3 TYPICAL ATRIAL FLUTTER (H): ICD-10-CM

## 2024-12-13 DIAGNOSIS — U07.1 COVID-19: ICD-10-CM

## 2024-12-13 DIAGNOSIS — R55 SYNCOPE, UNSPECIFIED SYNCOPE TYPE: ICD-10-CM

## 2024-12-13 DIAGNOSIS — R55 SYNCOPE AND COLLAPSE: ICD-10-CM

## 2024-12-13 LAB
ALBUMIN SERPL BCG-MCNC: 3.4 G/DL (ref 3.5–5.2)
ALP SERPL-CCNC: 46 U/L (ref 40–150)
ALT SERPL W P-5'-P-CCNC: 20 U/L (ref 0–50)
ANION GAP SERPL CALCULATED.3IONS-SCNC: 10 MMOL/L (ref 7–15)
AST SERPL W P-5'-P-CCNC: 28 U/L (ref 0–45)
ATRIAL RATE - MUSE: NORMAL BPM
BASOPHILS # BLD AUTO: 0 10E3/UL (ref 0–0.2)
BASOPHILS NFR BLD AUTO: 0 %
BILIRUB SERPL-MCNC: 0.4 MG/DL
BUN SERPL-MCNC: 26 MG/DL (ref 8–23)
CALCIUM SERPL-MCNC: 8.7 MG/DL (ref 8.8–10.4)
CHLORIDE SERPL-SCNC: 105 MMOL/L (ref 98–107)
CREAT SERPL-MCNC: 0.73 MG/DL (ref 0.51–0.95)
DIASTOLIC BLOOD PRESSURE - MUSE: NORMAL MMHG
EGFRCR SERPLBLD CKD-EPI 2021: 80 ML/MIN/1.73M2
EOSINOPHIL # BLD AUTO: 0 10E3/UL (ref 0–0.7)
EOSINOPHIL NFR BLD AUTO: 0 %
ERYTHROCYTE [DISTWIDTH] IN BLOOD BY AUTOMATED COUNT: 13.2 % (ref 10–15)
FLUAV RNA SPEC QL NAA+PROBE: NEGATIVE
FLUBV RNA RESP QL NAA+PROBE: NEGATIVE
GLUCOSE SERPL-MCNC: 105 MG/DL (ref 70–99)
HCO3 SERPL-SCNC: 30 MMOL/L (ref 22–29)
HCT VFR BLD AUTO: 35.5 % (ref 35–47)
HGB BLD-MCNC: 12 G/DL (ref 11.7–15.7)
HOLD SPECIMEN: NORMAL
IMM GRANULOCYTES # BLD: 0 10E3/UL
IMM GRANULOCYTES NFR BLD: 0 %
INTERPRETATION ECG - MUSE: NORMAL
LYMPHOCYTES # BLD AUTO: 1 10E3/UL (ref 0.8–5.3)
LYMPHOCYTES NFR BLD AUTO: 17 %
MAGNESIUM SERPL-MCNC: 1.7 MG/DL (ref 1.7–2.3)
MCH RBC QN AUTO: 31.8 PG (ref 26.5–33)
MCHC RBC AUTO-ENTMCNC: 33.8 G/DL (ref 31.5–36.5)
MCV RBC AUTO: 94 FL (ref 78–100)
MONOCYTES # BLD AUTO: 0.8 10E3/UL (ref 0–1.3)
MONOCYTES NFR BLD AUTO: 14 %
NEUTROPHILS # BLD AUTO: 3.8 10E3/UL (ref 1.6–8.3)
NEUTROPHILS NFR BLD AUTO: 68 %
NRBC # BLD AUTO: 0 10E3/UL
NRBC BLD AUTO-RTO: 0 /100
P AXIS - MUSE: NORMAL DEGREES
PLATELET # BLD AUTO: 123 10E3/UL (ref 150–450)
POTASSIUM SERPL-SCNC: 3.3 MMOL/L (ref 3.4–5.3)
PR INTERVAL - MUSE: NORMAL MS
PROT SERPL-MCNC: 5.8 G/DL (ref 6.4–8.3)
QRS DURATION - MUSE: 72 MS
QT - MUSE: 374 MS
QTC - MUSE: 406 MS
R AXIS - MUSE: 77 DEGREES
RBC # BLD AUTO: 3.77 10E6/UL (ref 3.8–5.2)
RSV RNA SPEC NAA+PROBE: NEGATIVE
SARS-COV-2 RNA RESP QL NAA+PROBE: POSITIVE
SODIUM SERPL-SCNC: 145 MMOL/L (ref 135–145)
SYSTOLIC BLOOD PRESSURE - MUSE: NORMAL MMHG
T AXIS - MUSE: 53 DEGREES
TSH SERPL DL<=0.005 MIU/L-ACNC: 0.84 UIU/ML (ref 0.3–4.2)
VENTRICULAR RATE- MUSE: 71 BPM
WBC # BLD AUTO: 5.6 10E3/UL (ref 4–11)

## 2024-12-13 PROCEDURE — 71046 X-RAY EXAM CHEST 2 VIEWS: CPT

## 2024-12-13 PROCEDURE — 36415 COLL VENOUS BLD VENIPUNCTURE: CPT | Performed by: EMERGENCY MEDICINE

## 2024-12-13 PROCEDURE — G1010 CDSM STANSON: HCPCS

## 2024-12-13 PROCEDURE — 85004 AUTOMATED DIFF WBC COUNT: CPT | Performed by: EMERGENCY MEDICINE

## 2024-12-13 PROCEDURE — G0378 HOSPITAL OBSERVATION PER HR: HCPCS

## 2024-12-13 PROCEDURE — 84155 ASSAY OF PROTEIN SERUM: CPT | Performed by: EMERGENCY MEDICINE

## 2024-12-13 PROCEDURE — 84443 ASSAY THYROID STIM HORMONE: CPT | Performed by: PHYSICIAN ASSISTANT

## 2024-12-13 PROCEDURE — 250N000013 HC RX MED GY IP 250 OP 250 PS 637: Performed by: PHYSICIAN ASSISTANT

## 2024-12-13 PROCEDURE — 93005 ELECTROCARDIOGRAM TRACING: CPT

## 2024-12-13 PROCEDURE — 93005 ELECTROCARDIOGRAM TRACING: CPT | Mod: 76

## 2024-12-13 PROCEDURE — 80053 COMPREHEN METABOLIC PANEL: CPT | Performed by: EMERGENCY MEDICINE

## 2024-12-13 PROCEDURE — 258N000003 HC RX IP 258 OP 636: Performed by: EMERGENCY MEDICINE

## 2024-12-13 PROCEDURE — 99285 EMERGENCY DEPT VISIT HI MDM: CPT | Mod: 25

## 2024-12-13 PROCEDURE — 99223 1ST HOSP IP/OBS HIGH 75: CPT | Mod: A1 | Performed by: PHYSICIAN ASSISTANT

## 2024-12-13 PROCEDURE — 250N000013 HC RX MED GY IP 250 OP 250 PS 637: Performed by: HOSPITALIST

## 2024-12-13 PROCEDURE — 83735 ASSAY OF MAGNESIUM: CPT | Performed by: EMERGENCY MEDICINE

## 2024-12-13 PROCEDURE — 258N000003 HC RX IP 258 OP 636: Performed by: PHYSICIAN ASSISTANT

## 2024-12-13 PROCEDURE — 250N000013 HC RX MED GY IP 250 OP 250 PS 637: Performed by: INTERNAL MEDICINE

## 2024-12-13 PROCEDURE — 87637 SARSCOV2&INF A&B&RSV AMP PRB: CPT | Performed by: EMERGENCY MEDICINE

## 2024-12-13 RX ORDER — DONEPEZIL HYDROCHLORIDE 5 MG/1
5 TABLET, FILM COATED ORAL AT BEDTIME
Status: DISCONTINUED | OUTPATIENT
Start: 2024-12-13 | End: 2024-12-18 | Stop reason: HOSPADM

## 2024-12-13 RX ORDER — MAGNESIUM OXIDE 400 MG/1
400 TABLET ORAL EVERY 4 HOURS
Status: COMPLETED | OUTPATIENT
Start: 2024-12-13 | End: 2024-12-13

## 2024-12-13 RX ORDER — ACETAMINOPHEN 650 MG/1
650 SUPPOSITORY RECTAL EVERY 4 HOURS PRN
Status: DISCONTINUED | OUTPATIENT
Start: 2024-12-13 | End: 2024-12-18 | Stop reason: HOSPADM

## 2024-12-13 RX ORDER — ACYCLOVIR 400 MG/1
400 TABLET ORAL PRN
COMMUNITY

## 2024-12-13 RX ORDER — ACETAMINOPHEN 325 MG/1
650 TABLET ORAL EVERY 4 HOURS PRN
Status: DISCONTINUED | OUTPATIENT
Start: 2024-12-13 | End: 2024-12-18 | Stop reason: HOSPADM

## 2024-12-13 RX ORDER — CALCIUM CARBONATE 500(1250)
1 TABLET ORAL DAILY
COMMUNITY
End: 2024-12-19

## 2024-12-13 RX ORDER — MULTIVIT WITH MINERALS/LUTEIN
1000 TABLET ORAL DAILY
COMMUNITY

## 2024-12-13 RX ORDER — LIDOCAINE 40 MG/G
CREAM TOPICAL
Status: DISCONTINUED | OUTPATIENT
Start: 2024-12-13 | End: 2024-12-18 | Stop reason: HOSPADM

## 2024-12-13 RX ORDER — LIDOCAINE 40 MG/G
CREAM TOPICAL
Status: DISCONTINUED | OUTPATIENT
Start: 2024-12-13 | End: 2024-12-13

## 2024-12-13 RX ORDER — ONDANSETRON 2 MG/ML
4 INJECTION INTRAMUSCULAR; INTRAVENOUS EVERY 6 HOURS PRN
Status: DISCONTINUED | OUTPATIENT
Start: 2024-12-13 | End: 2024-12-18 | Stop reason: HOSPADM

## 2024-12-13 RX ORDER — ONDANSETRON 4 MG/1
4 TABLET, ORALLY DISINTEGRATING ORAL EVERY 6 HOURS PRN
Status: DISCONTINUED | OUTPATIENT
Start: 2024-12-13 | End: 2024-12-18 | Stop reason: HOSPADM

## 2024-12-13 RX ORDER — MEMANTINE HYDROCHLORIDE 10 MG/1
10 TABLET ORAL 2 TIMES DAILY
COMMUNITY

## 2024-12-13 RX ORDER — CALCIUM CARBONATE/VITAMIN D3 600 MG-10
2 TABLET ORAL DAILY
COMMUNITY

## 2024-12-13 RX ORDER — POTASSIUM CHLORIDE 1500 MG/1
20 TABLET, EXTENDED RELEASE ORAL ONCE
Status: COMPLETED | OUTPATIENT
Start: 2024-12-13 | End: 2024-12-13

## 2024-12-13 RX ORDER — SODIUM CHLORIDE 9 MG/ML
INJECTION, SOLUTION INTRAVENOUS CONTINUOUS
Status: ACTIVE | OUTPATIENT
Start: 2024-12-13 | End: 2024-12-14

## 2024-12-13 RX ORDER — MEMANTINE HYDROCHLORIDE 10 MG/1
10 TABLET ORAL 2 TIMES DAILY
Status: DISCONTINUED | OUTPATIENT
Start: 2024-12-13 | End: 2024-12-18 | Stop reason: HOSPADM

## 2024-12-13 RX ORDER — ASPIRIN 81 MG/1
81 TABLET ORAL AT BEDTIME
COMMUNITY

## 2024-12-13 RX ORDER — PROCHLORPERAZINE MALEATE 5 MG/1
5 TABLET ORAL EVERY 6 HOURS PRN
Status: DISCONTINUED | OUTPATIENT
Start: 2024-12-13 | End: 2024-12-18 | Stop reason: HOSPADM

## 2024-12-13 RX ADMIN — SODIUM CHLORIDE 1000 ML: 9 INJECTION, SOLUTION INTRAVENOUS at 12:25

## 2024-12-13 RX ADMIN — SODIUM CHLORIDE: 9 INJECTION, SOLUTION INTRAVENOUS at 19:36

## 2024-12-13 RX ADMIN — Medication 400 MG: at 19:34

## 2024-12-13 RX ADMIN — Medication 5 MG: at 22:18

## 2024-12-13 RX ADMIN — POTASSIUM CHLORIDE 20 MEQ: 1500 TABLET, EXTENDED RELEASE ORAL at 19:34

## 2024-12-13 RX ADMIN — MEMANTINE 10 MG: 10 TABLET ORAL at 19:34

## 2024-12-13 RX ADMIN — Medication 400 MG: at 22:18

## 2024-12-13 RX ADMIN — DONEPEZIL HYDROCHLORIDE 5 MG: 5 TABLET, FILM COATED ORAL at 22:18

## 2024-12-13 ASSESSMENT — ACTIVITIES OF DAILY LIVING (ADL)
ADLS_ACUITY_SCORE: 62
ADLS_ACUITY_SCORE: 62
ADLS_ACUITY_SCORE: 57
ADLS_ACUITY_SCORE: 57
ADLS_ACUITY_SCORE: 62
ADLS_ACUITY_SCORE: 55
ADLS_ACUITY_SCORE: 57

## 2024-12-13 ASSESSMENT — COLUMBIA-SUICIDE SEVERITY RATING SCALE - C-SSRS
1. IN THE PAST MONTH, HAVE YOU WISHED YOU WERE DEAD OR WISHED YOU COULD GO TO SLEEP AND NOT WAKE UP?: NO
2. HAVE YOU ACTUALLY HAD ANY THOUGHTS OF KILLING YOURSELF IN THE PAST MONTH?: NO
6. HAVE YOU EVER DONE ANYTHING, STARTED TO DO ANYTHING, OR PREPARED TO DO ANYTHING TO END YOUR LIFE?: NO

## 2024-12-13 NOTE — PROGRESS NOTES
RECEIVING UNIT ED HANDOFF REVIEW    ED Nurse Handoff Report was reviewed by: Vonda Elder RN on December 13, 2024 at 5:58 PM

## 2024-12-13 NOTE — PHARMACY-ADMISSION MEDICATION HISTORY
Pharmacist Admission Medication History    Admission medication history is complete. The information provided in this note is only as accurate as the sources available at the time of the update.    Information Source(s): Family member, CareEverywhere/SureScripts, and Pt med list  via N/A    Pertinent Information:   - Updated per patient home medication list    Changes made to PTA medication list:  Added:   Memantine, ASA, Calcium D, Vit D, Vit E, Acyclovie  Deleted:   Albuterol, Mupirocin, Prednisone, Vit D  Changed:   Melatonin 10mg at bedtime --> 5 mg qhs    Allergies reviewed with patient and updates made in EHR: unable to assess    Medication History Completed By: Delia Joy [facility] 12/13/2024 3:10 PM    PTA Med List   Medication Sig Last Dose/Taking    acyclovir (ZOVIRAX) 400 MG tablet Take 400 mg by mouth as needed (cold sores). Taking As Needed    Ascorbic Acid (VITAMIN C PO) Take 1 tablet by mouth daily as needed (when feeling under the weather). Taking As Needed    aspirin 81 MG EC tablet Take 81 mg by mouth at bedtime. Taking    atorvastatin (LIPITOR) 10 MG tablet Take 1 tablet (10 mg) by mouth daily Pt is requesting that she only receive [employer]. Taking    calcium carbonate 500 mg, elemental, (OSCAL) 500 MG tablet Take 1 tablet by mouth daily. Taking    calcium carbonate-vitamin D (CALTRATE) 600-10 MG-MCG per tablet Take 2 tablets by mouth daily. Taking    carboxymethylcellulose PF (REFRESH PLUS) 0.5 % ophthalmic solution Place 1 drop into both eyes 4 times daily as needed for dry eyes Uncertain of otc product Taking As Needed    cholecalciferol (VITAMIN D3) 25 mcg (1000 units) capsule Take 2 capsules by mouth daily. Taking    Coenzyme Q10 (COQ10 PO) TAKE 2 TEASPOONS OF QUNOL-LIQUID DAILY Taking    cyanocobalamin (VITAMIN B-12) 500 MCG SUBL sublingual tablet Place 500 mcg under the tongue once a week. On Mondays Taking    donepezil (ARICEPT) 5 MG tablet Take 5 mg by mouth At Bedtime Taking     hydrochlorothiazide (HYDRODIURIL) 25 MG tablet Take 25 mg by mouth daily Taking    Lutein-Zeaxanthin 25-5 MG CAPS Take 1 capsule by mouth daily Taking    melatonin 5 MG tablet Take 5 mg by mouth nightly as needed for sleep. Taking As Needed    memantine (NAMENDA) 10 MG tablet Take 10 mg by mouth 2 times daily. Taking    multivitamin w/minerals (THERA-VIT-M) tablet Take 1 tablet by mouth daily Taking    Omega-3 Fatty Acids (FISH OIL) 1200 MG capsule Take 1,200 mg by mouth daily Taking    polyethylene glycol (MIRALAX) powder Take 17 g (1 capful) by mouth daily INDICATION: CONSTIPATION, TO ACHIEVE 1-2 SOFT BMs PER DAY Taking    potassium citrate (UROCIT-K) 10 MEQ (1080 MG) CR tablet Take 10 mEq by mouth 3 times daily (with meals) Taking    vitamin E (TOCOPHEROL) 1000 units (450 mg) CAPS capsule Take 1,000 Units by mouth daily. Taking    zinc gluconate 50 MG tablet Take 50 mg by mouth daily Unsure of which zinc formulation Taking

## 2024-12-13 NOTE — CONSULTS
Patient has .    Eliquis/Xarelto:  $43/mo ($38 total for 3 months at [facility]).      Milly Spring  Pharmacy Technician/Liaison, Discharge Pharmacy   [phone]  [email or web address]

## 2024-12-13 NOTE — ED NOTES
Bed: ED25  Expected date:   Expected time:   Means of arrival:   Comments:  HEMS [phone] multiple syncopal episodes, hypotension

## 2024-12-13 NOTE — ED PROVIDER NOTES
Emergency Department Note      History of Present Illness     Chief Complaint   Syncope    History limited due to patient's baseline mentation.    HPI   Carmen Zhang is a 84 year old female with history of hypertension, hyperlipidemia, Meniere's disease, syncope, and Alzheimer's disease who presents to the ED via EMS for evaluation of syncope. Nurse present reports Carmen had a fall 2 days ago and hit her head. She was sent home and has since had multiple episodes of syncope and falls. She does not remember the events surrounding the falls and her  has found her on the floor. EMS was called yesterday but Carmen was not brought into the ED. She was positive for orthostatics today. She has not been eating and drinking normally, per EMS. Carmen denies any complaints of pain or lightheadedness.      Independent Historian   Nurse present as detailed above.  I spoke with the patient's  to arrive later.  He reports that she was seen in the emergency department after she slipped on some ice and hit her head.  The following day she had a syncopal episode while standing.  He reports that he caught her.  He called EMS and they thought that she might be dehydrated so they have been pushing fluids.  He states her p.o. intake has been poor.  Earlier today he states that she was extremely weak, slept in and had trouble getting out of bed.  He reports that she passed out on 3 separate occasions.  The first 2 times were when he was with her.  The last time she fell to the ground and he found her on the ground.  He reports she has been vaccinated gets COVID x 2.  Has not had a fever or other known illness.  She reports that she does not go out of the house much.  Not sure when symptoms might of started and no known exposure.    Review of External Notes   I reviewed the primary care office visit note on 9/25/24.  No noted history of atrial fibrillation.    Past Medical History     Medical History and Problem List    Hypertension   Herpes simplex  Hyperlipidemia  Insomnia  Lumbago  Alzheimer's disease  Meniere's disease  Osteoporosis  Other ovarian failure  Personal history of other disorders of nervous system and sense organs  Sensorineural hearing loss, unilateral  Vitamin B12 deficiency   Vitamin D deficiency  Syncope  Frontotemporal dementia   Varicose veins of bilateral lower extremities   Polypharmacy   Benzodiazepine dependence, continuous  Seasonal allergies      Medications   Atorvastatin  Donepezil  Hydrochlorothiazide  Lutein-Zeaxanthin  Potassium citrate  Memantine   Aspirin 81 mg   Quetiapine     Surgical History   Breast biopsy  Total abdominal hysterectomy, bilateral salpingo oophorectomy  Appendectomy  Pericardial window   Bladder suspension  Vein stripping   Breast lumpectomy   Inner ear surgery    Physical Exam     Patient Vitals for the past 24 hrs:   BP Temp Temp src Pulse Resp SpO2   12/13/24 1400 91/58 -- -- 69 18 95 %   12/13/24 1330 98/58 -- -- 75 17 96 %   12/13/24 1300 112/84 -- -- 75 -- --   12/13/24 1244 -- -- -- 79 14 98 %   12/13/24 1230 112/71 -- -- 80 15 98 %   12/13/24 1208 110/65 98.2  F (36.8  C) Oral 81 16 97 %     Physical Exam  General: Well-nourished, appears fatigued  Eyes: PERRL, conjunctivae pink no scleral icterus or conjunctival injection  ENT:  Moist mucus membranes, posterior oropharynx clear without erythema or exudates, no hemotympanum  Respiratory:  Lungs clear to auscultation bilaterally, no crackles/rubs/wheezes.  Good air movement.  No seatbelt sign or ecchymoses  CV: Normal rate and rhythm, no murmurs/rubs/gallops  GI:  Abdomen soft and non-distended.  Normoactive BS.  No tenderness, guarding or rebound  Skin: Warm, dry.  No rashes or petechiae  Musculoskeletal: No peripheral edema or calf tenderness.  No midline tenderness of the cervical/thoracic/lumbar spine.  No tenderness/crepitus/bony stepoffs over the clavicles, chest wall, pelvis, arms or legs.  Neuro: Alert and  oriented to person. PERRL, EOMI no nystagmus, no aphasia/facial droop/dysarthria, tongue midline, symmetric palatal elevation, normal strength at SCM/trapezius/BUE/BLE, normal coordination to FNF at BUE, gait deferred, sensation intact to LT over face/BUE/BLE  Psychiatric: Normal affect    Diagnostics     Lab Results   Labs Ordered and Resulted from Time of ED Arrival to Time of ED Departure   COMPREHENSIVE METABOLIC PANEL - Abnormal       Result Value    Sodium 145      Potassium 3.3 (*)     Carbon Dioxide (CO2) 30 (*)     Anion Gap 10      Urea Nitrogen 26.0 (*)     Creatinine 0.73      GFR Estimate 80      Calcium 8.7 (*)     Chloride 105      Glucose 105 (*)     Alkaline Phosphatase 46      AST 28      ALT 20      Protein Total 5.8 (*)     Albumin 3.4 (*)     Bilirubin Total 0.4     CBC WITH PLATELETS AND DIFFERENTIAL - Abnormal    WBC Count 5.6      RBC Count 3.77 (*)     Hemoglobin 12.0      Hematocrit 35.5      MCV 94      MCH 31.8      MCHC 33.8      RDW 13.2      Platelet Count 123 (*)     % Neutrophils 68      % Lymphocytes 17      % Monocytes 14      % Eosinophils 0      % Basophils 0      % Immature Granulocytes 0      NRBCs per 100 WBC 0      Absolute Neutrophils 3.8      Absolute Lymphocytes 1.0      Absolute Monocytes 0.8      Absolute Eosinophils 0.0      Absolute Basophils 0.0      Absolute Immature Granulocytes 0.0      Absolute NRBCs 0.0     INFLUENZA A/B, RSV AND SARS-COV2 PCR - Abnormal    Influenza A PCR Negative      Influenza B PCR Negative      RSV PCR Negative      SARS CoV2 PCR Positive (*)    MAGNESIUM - Normal    Magnesium 1.7     ROUTINE UA WITH MICROSCOPIC REFLEX TO CULTURE       Imaging   CT Head w/o Contrast   Final Result   IMPRESSION:   No acute intracranial abnormality.         LEVI MONDRAGON MD            SYSTEM ID:  [id number]        EKG   ECG results from 12/13/24   EKG 12 lead     Value    Systolic Blood Pressure     Diastolic Blood Pressure     Ventricular Rate 71    Atrial  Rate     HI Interval     QRS Duration 72        QTc 406    P Axis     R AXIS 77    T Axis 53    Interpretation ECG      Atrial fibrillation  Nonspecific T wave abnormality  Abnormal ECG  When compared with ECG of 29-Apr-2023 21:50,  Atrial fibrillation has replaced Sinus rhythm  Nonspecific T wave abnormality now evident in Inferior leads  Nonspecific T wave abnormality, worse in Anterolateral leads     EKG 12 lead     Value    Systolic Blood Pressure     Diastolic Blood Pressure     Ventricular Rate 72    Atrial Rate     HI Interval     QRS Duration 72        QTc 424    P Axis     R AXIS 89    T Axis 75    Interpretation ECG      Atrial fibrillation with a competing junctional pacemaker  Abnormal ECG  When compared with ECG of 13-Dec-2024 12:36, (unconfirmed)  No significant change was found  Read at 1405 by Dr. Allen        Independent Interpretation   CT Head: No intracranial hemorrhage.    ED Course      Medications Administered   Medications   lidocaine 1 % 0.1-1 mL (has no administration in time range)   lidocaine (LMX4) cream (has no administration in time range)   sodium chloride (PF) 0.9% PF flush 3 mL (has no administration in time range)   sodium chloride (PF) 0.9% PF flush 3 mL (has no administration in time range)   sodium chloride 0.9% BOLUS 1,000 mL (1,000 mLs Intravenous $New Bag 12/13/24 1225)       Procedures   Procedures     Discussion of Management   Admitting Hospitalist, Muriel Rose PA-C, for Dr. Wall    ED Course   ED Course as of 12/13/24 1455   Fri Dec 13, 2024   1215 I obtained history and examined the patient as noted above.    1342 I rechecked the patient and explained findings. Updated patient and  at bedside who provided additional history.    1357 I spoke with Muriel Rose PA-C, Hospitalist, regarding the patient's history and presentation in the emergency department today. Admitting patient for Dr. Wall.       Additional Documentation  None    Medical Decision  Making / Diagnosis     CMS Diagnoses: None    MIPS       None    [facility]   Carmen Zhang is a 84 year old female who comes from home where she lives with her  with recurrent episodes of syncope.  She appears to be in atrial flutter versus atrial fibrillation though rate controlled.  This is not noted on previous record so it is likely new for her.  She has had no other malignant arrhythmias on the monitor.  She has fallen multiple times and struck her head and so blood thinners were not indicated and were not started.  Head CT was repeated given that she fell today onto the ground.  Fortunately no signs of new intracranial hemorrhage or injury.  Hemoglobin is not significantly lower than it was a few days prior when she had her fall and head injury.  Electrolytes are reassuring.  She was treated with IV fluids as it seems most likely that she was orthostatic.  However, etiology for her recurrent syncope nor her orthostasis clear.  Viral testing was unexpectedly positive for was COVID.  It is not clear on history when the onset of the COVID might have been.  This is likely contributing to her weakness.  We will admit to the hospital for observation for recurrent syncope as well as nursing cares given her weakness and recurrent falls.  Patient's  was in agreement the plan.      Disposition     The patient was admitted to the hospital.     Diagnosis     ICD-10-CM    1. Syncope and collapse  R55       2. COVID-19  U07.1       3. Weakness generalized  R53.1            Discharge Medications   New Prescriptions    No medications on file         Scribe Disclosure:  JOSE RAMON, Evelina Lr, am serving as a scribe at 12:30 PM on 12/13/2024 to document services personally performed by Lo Allen MD based on my observations and the provider's statements to me.        Lo Allen MD  12/13/24 3575

## 2024-12-13 NOTE — H&P
[facility]    History and Physical - Hospitalist Service       Date of Admission:  12/13/2024    Assessment & Plan   Carmen Zhang is a 84 year old female with PMHx including cognitive impairment, hypertension, and hyperlipidemia who presented to the ED for the second time this week subsequent to a syncopal event. She was found to have positive orthostatic BPs, positive for Covid, and noted to be in new a fib/flutter with CVR. Registered under observation status for further evaluation and treatment.     Initial syncopal episode described as a mechanical fall while getting out of a car at which time she hit her head. She presented to the ED 12/11. CT head negative. Scalp lac repaired. Pt had recurrent, unwitnessed syncope 12/12 at which time EMS were called and pt reportedly had positive orthostatics, but was not brought to the ED. Subsequent to that, she had has 2-3 episodes of unwitnessed falls overnight while going to the bathroom. No CP, palpitations, prodromal dizziness. Pt does take ASA 81 mg po every day.     Syncope and collapse, multifactorial  Orthostatic hypotension   Generalized weakness  Posterior scalp lac s/p repair (12/11/2024): Likely multifactorial in the setting of orthostatic hypotension, new a fib/flutter with CVR, covid infection.   *CMP with K 3.3, otherwise largely unremarkable. CBC with mild thrombocytopenia. Covid positive. CT head negative. EKG with a fib with CVR   *Echo 4/2023 with EF 60-65%, no RWMA, grade II or moderate DD, no significant valve disease.   *S/P 1 L IVF bolus   - Wheeler to observation   - Telemetry   - Follow orthostatics   - mIVF with 0.9% NS at 100 ccs/hr X10 hrs given general poor oral intake   - Mod CHO diet per patient preference given hx of prediabetes   - PT consulted   - SW consulted     Atrial fibrillation/flutter with CVR, new diagnosis:   *[id number] score of at least 4   - Telemetry   - Update echo   - Hold on initiation of  rate controlling agent given CVR on admission, hold on anticoagulation at this time given recurrent syncope and collapse.  - Pharmacy liaison consult for DOAC coverage   - High electrolyte replacement protocol in place   - Cardiology consulted, appreciate input     Covid infection: Vaccinated X2, but no recent boosters. Sx of weakness.   - Offered Paxlovid, but pt/spouse declined at this time   - Ambulate, initiate DVT chemoprophylaxis in the AM if not getting out of bed      Hypokalemia: K 3.3 on admission.   - Electrolyte replacement protocol in place     Thrombocytopenia: Platelets 123, likely reactive in the setting of viral infection.   - Monitor     Hypertension: Hold PTA hydrochlorothiazide given orthostasis     Hyperlipidemia: Hold statin given obs status     Cognitive impairment: Follows with Dr. Fish, last visit 8/19/24.   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     Diet:  Mod CHO   DVT Prophylaxis: Pneumatic Compression Devices and Ambulate every shift  Hoff Catheter: Not present  Lines: None     Cardiac Monitoring: None  Code Status:  Full Code     Clinically Significant Risk Factors Present on Admission        # Hypokalemia: Lowest K = 3.3 mmol/L in last 2 days, will replace as needed   # Hypochloremia: Lowest Cl = 95 mmol/L in last 2 days, will monitor as appropriate      # Hypoalbuminemia: Lowest albumin = 3.4 g/dL at 12/13/2024 12:24 PM, will monitor as appropriate   # Thrombocytopenia: Lowest platelets = 123 in last 2 days, will monitor for bleeding   # Hypertension: Noted on problem list     # Dementia: noted on problem list                  Disposition Plan     Medically Ready for Discharge: Anticipated Tomorrow         The  patient's care was discussed with the Attending Physician, Dr. Wall, Bedside Nurse, Patient, and Patient's Family.    Muriel Rose PA-C  Hospitalist Service  [facility]  Securely message with Leostream (more info)  Text page via [facility] Paging/Directory     ______________________________________________________________________    Chief Complaint   Syncope and collapse     History is obtained from the patient, though note MCI.      History of Present Illness   Carmen Zhang is a 84 year old female with PMHx including cognitive impairment, hypertension, and hyperlipidemia who presented to the ED for the second time this week subsequent to a syncopal event. She was found to have positive orthostatic BPs, positive for Covid, and noted to be in new a fib/flutter with CVR. Registered under observation status for further evaluation and treatment.     Initial syncopal episode described as a mechanical fall while getting out of a car at which time she hit her head. She presented to the ED 12/11. CT head negative. Scalp lac repaired. Pt had recurrent, unwitnessed syncope 12/12 at which time EMS were called and pt reportedly had positive orthostatics, but was not brought to the ED. Subsequent to that, she had has 2-3 episodes of unwitnessed falls overnight while going to the bathroom. No CP, palpitations, prodromal dizziness. Pt does take ASA 81 mg po every day.     Seen in the ED by Dr. Allen. CMP with K 3.3, otherwise largely unremarkable. CBC with mild thrombocytopenia. Covid positive. CT head negative. EKG with a fib with CVR. Echo 4/2023 with EF 60-65%, no RWMA, grade II or moderate DD, no significant valve disease. S/P 1 L IVF bolus.     During my interview, pt confirmed the above hx. Currently without dizziness or vertiginous sx, chest pain, palpitations. Discussed plan of care as above with patient and .     Past Medical History    Past Medical History:    Diagnosis Date    Benign essential hypertension     Female stress incontinence     Herpes simplex without mention of complication     Hyperlipidemia     Insomnia     Lumbago 08/17/2006    MCI (mild cognitive impairment)     Meniere's disease     Osteoporosis     Other ovarian failure     Personal history of other disorders of nervous system and sense organs     Sensorineural hearing loss, unilateral        Past Surgical History   Past Surgical History:   Procedure Laterality Date    BREAST BIOPSY, RT/LT      HYSTERECTOMY, VAGINAL      ovaries left    ZZC APPENDECTOMY      ZZC NONSPECIFIC PROCEDURE  33 yrs old    removal of growth on heart     ZZC NONSPECIFIC PROCEDURE      bladder suspension    ZZ NONSPECIFIC PROCEDURE      vein stripping       Prior to Admission Medications   Prior to Admission Medications   Prescriptions Last Dose Informant Patient Reported? Taking?   Ascorbic Acid (VITAMIN C PO)   Yes No   Sig: Take 1 tablet by mouth daily   Coenzyme Q10 (COQ10 PO)  Self Yes No   Sig: TAKE 2 TEASPOONS OF QUNOL-LIQUID DAILY   Lutein-Zeaxanthin 25-5 MG CAPS   Yes No   Sig: Take 1 capsule by mouth daily   Melatonin 10 MG TABS  Self Yes No   Sig: Take 10 mg by mouth nightly as needed    Omega-3 Fatty Acids (FISH OIL) 1200 MG capsule  Self Yes No   Sig: Take 1,200 mg by mouth daily   Vitamin D3 (CHOLECALCIFEROL) 125 MCG (5000 UT) tablet  Self Yes No   Sig: Take 125 mcg by mouth daily   albuterol (PROAIR HFA/PROVENTIL HFA/VENTOLIN HFA) 108 (90 Base) MCG/ACT inhaler   No No   Sig: Inhale 2 puffs into the lungs 4 times daily as needed for shortness of breath   atorvastatin (LIPITOR) 10 MG tablet  Self No No   Sig: Take 1 tablet (10 mg) by mouth daily Pt is requesting that she only receive [employer].   calcium-vitamin D (CALCIUM 600 + D) 600-400 MG-UNIT per tablet  Self No No   Sig: Take 1 tablet by mouth 2 times daily FOR BONE HEALTH AND FOR VITAMIN D DEFICIENCY (LOW VITAMIN D)   carboxymethylcellulose PF (REFRESH  PLUS) 0.5 % ophthalmic solution  Self Yes No   Sig: Place 1 drop into both eyes 4 times daily as needed for dry eyes Uncertain of otc product   cyabnocobalamin (VITAMIN B-12) 2500 MCG sublingual tablet  Self No No   Sig: Take 2,500 mcg by mouth daily INDICATION: FOR VITAMIN B12 SUPPLEMENTATION - TO IMPROVE MEMORY, BALANCE, SLEEP AND MOOD, DIRECTIONS: PLEASE PLACE UNDER THE TONGUE TO DISSOLVE   donepezil (ARICEPT) 5 MG tablet   Yes No   Sig: Take 5 mg by mouth At Bedtime   hydrochlorothiazide (HYDRODIURIL) 25 MG tablet  Self Yes No   Sig: Take 25 mg by mouth daily   multivitamin w/minerals (THERA-VIT-M) tablet  Self Yes No   Sig: Take 1 tablet by mouth daily   mupirocin (BACTROBAN) 2 % external ointment   No No   Sig: Apply topically 3 times daily   polyethylene glycol (MIRALAX) powder  Self No No   Sig: Take 17 g (1 capful) by mouth daily INDICATION: CONSTIPATION, TO ACHIEVE 1-2 SOFT BMs PER DAY   potassium citrate (UROCIT-K) 10 MEQ (1080 MG) CR tablet   Yes No   Sig: Take 10 mEq by mouth 3 times daily (with meals)   predniSONE (DELTASONE) 10 MG tablet   No No   Sig: Take three tablets (30mg) for three days then two tablets (20mg) for three days then one tablet (10mg) for three days then stop   zinc gluconate 50 MG tablet  Self Yes No   Sig: Take 50 mg by mouth daily Unsure of which zinc formulation      Facility-Administered Medications: None        Review of Systems    The 10 point Review of Systems is negative other than noted in the HPI.    Social History   I have reviewed this patient's social history and updated it with pertinent information if needed.  Social History     Tobacco Use    Smoking status: Former     Current packs/day: 0.00     Average packs/day: 3.0 packs/day for 13.0 years (39.0 ttl pk-yrs)     Types: Cigarettes     Start date: 1948     Quit date: 1961     Years since quittin.9    Smokeless tobacco: Never    Tobacco comments:     STARTED SMOKING AT AGE 8 - quit in    Substance  Use Topics    Alcohol use: No     Alcohol/week: 0.0 standard drinks of alcohol    Drug use: No     Family History   I have reviewed this patient's family history and updated it with pertinent information if needed.  Family History   Problem Relation Age of Onset    Cancer Mother         ESOPHAGEAL, SMOKER    C.A.D. Mother         MI IN HER 70s    C.A.D. Father         MI IN HIS 70s    Prostate Cancer Father     Neurologic Disorder Father         PARKINSON'S    Cerebrovascular Disease Maternal Grandmother     Blood Disease Maternal Grandfather     Diabetes Maternal Aunt     [email or web address].[email or web address]. Brother         11 STENTS AND BYPASS, IN HIS FIFTIES    Obesity Brother     Connective Tissue Disorder Daughter         HIP PRESENTATION    Diabetes Maternal Aunt     Connective Tissue Disorder Daughter         ARTHRITIS     Allergies   Allergies   Allergen Reactions    Diatrizoate Hives and Itching    Dye [Contrast Dye] Hives and Itching    Iodine Hives    Seasonal Allergies         Physical Exam   Vital Signs: Temp: 98.2  F (36.8  C) Temp src: Oral BP: 112/84 Pulse: 75   Resp: 14 SpO2: 98 % O2 Device: None (Room air)    Weight: 0 lbs 0 oz    CONSTITUTIONAL: Pt laying in bed, dressed in hospital garb. Appears comfortable. Cooperative with interview.  HEENT: Normocephalic, atraumatic.   CARDIOVASCULAR: Irregularly irregular, rate controlled. No murmurs, rubs, or extra heart sounds appreciated. Pulses +2/4 and regular in upper and lower extremities, bilaterally.   RESPIRATORY: No increased work of breathing. CTA, bilat; no wheezes, rales, or rhonchi appreciated.  GASTROINTESTINAL:  Abdomen soft, non-distended. BS auscultated in all four quadrants. Negative for tenderness to palpation.   MUSCULOSKELETAL: No gross deformities noted. Normal muscle tone.   HEMATOLOGIC/LYMPHATIC/IMMUNOLOGIC: Negative for lower extremity edema, bilaterally.  NEUROLOGIC: Alert and oriented to person, place, and time, forgetful. No focal neuro deficits.   SKIN:  Warm, dry, intact.     Medical Decision Making       75 MINUTES SPENT BY ME on the date of service doing chart review, history, exam, documentation & further activities per the note.      Data     I have personally reviewed the following data over the past 24 hrs:    5.6  \   12.0   / 123 (L)     145 105 26.0 (H) /  105 (H)   3.3 (L) 30 (H) 0.73 \     ALT: 20 AST: 28 AP: 46 TBILI: 0.4   ALB: 3.4 (L) TOT PROTEIN: 5.8 (L) LIPASE: N/A     TSH: 0.84 T4: N/A A1C: N/A       Imaging results reviewed over the past 24 hrs:   Recent Results (from the past 24 hours)   CT Head w/o Contrast    Narrative    CT SCAN OF THE HEAD WITHOUT CONTRAST   12/13/2024 1:19 PM     HISTORY: recurrent falls    TECHNIQUE:  Axial images of the head and coronal reformations without  IV contrast material. Radiation dose for this scan was reduced using  automated exposure control, adjustment of the mA and/or kV according  to patient size, or iterative reconstruction technique.    COMPARISON: 12/11/2024    FINDINGS: There is no evidence of intracranial hemorrhage, mass, acute  infarct or anomaly. The ventricles are normal in size, shape and  configuration. Mild diffuse parenchymal volume loss. Mild patchy  periventricular white matter hypodensities which are nonspecific, but  likely related to chronic microvascular ischemic disease.     No significant paranasal sinus or mastoid inflammatory changes. Prior  left canal wall up mastoidectomy. The bony calvarium and bones of the  skull base appear intact. Left parietal scalp hematoma.      Impression    IMPRESSION:   No acute intracranial abnormality.      LEVI MONDRAGON MD         SYSTEM ID:  [id number]

## 2024-12-13 NOTE — ED NOTES
[facility]  ED Nurse Handoff Report    ED Chief complaint: Syncope      ED Diagnosis:   Final diagnoses:   Syncope and collapse   COVID-19   Weakness generalized       Code Status: Full Code per chart review; admitting to confirm    Allergies:   Allergies   Allergen Reactions    Diatrizoate Hives and Itching    Dye [Contrast Dye] Hives and Itching    Iodine Hives    Seasonal Allergies        Patient Story: fell on Wednesday and was seen/treated/discharged (lac to posterior scalp). Come in today as in past couple days has had several unwitnessed falls without memory of events so supposed syncope.  reports patient has had poor PO intake. EMS noted orthostatic.  Focused Assessment:  weak, posterior scalp lac s/p stables with surrounding dried blood and hematoma  CT Head w/o Contrast   Final Result   IMPRESSION:   No acute intracranial abnormality.         LEVI MONDRAGON MD            SYSTEM ID:  [id number]        Labs Ordered and Resulted from Time of ED Arrival to Time of ED Departure   COMPREHENSIVE METABOLIC PANEL - Abnormal       Result Value    Sodium 145      Potassium 3.3 (*)     Carbon Dioxide (CO2) 30 (*)     Anion Gap 10      Urea Nitrogen 26.0 (*)     Creatinine 0.73      GFR Estimate 80      Calcium 8.7 (*)     Chloride 105      Glucose 105 (*)     Alkaline Phosphatase 46      AST 28      ALT 20      Protein Total 5.8 (*)     Albumin 3.4 (*)     Bilirubin Total 0.4     CBC WITH PLATELETS AND DIFFERENTIAL - Abnormal    WBC Count 5.6      RBC Count 3.77 (*)     Hemoglobin 12.0      Hematocrit 35.5      MCV 94      MCH 31.8      MCHC 33.8      RDW 13.2      Platelet Count 123 (*)     % Neutrophils 68      % Lymphocytes 17      % Monocytes 14      % Eosinophils 0      % Basophils 0      % Immature Granulocytes 0      NRBCs per 100 WBC 0      Absolute Neutrophils 3.8      Absolute Lymphocytes 1.0      Absolute Monocytes 0.8      Absolute Eosinophils 0.0      Absolute Basophils 0.0       Absolute Immature Granulocytes 0.0      Absolute NRBCs 0.0     INFLUENZA A/B, RSV AND SARS-COV2 PCR - Abnormal    Influenza A PCR Negative      Influenza B PCR Negative      RSV PCR Negative      SARS CoV2 PCR Positive (*)    MAGNESIUM - Normal    Magnesium 1.7     ROUTINE UA WITH MICROSCOPIC REFLEX TO CULTURE         Treatments and/or interventions provided:   Medications   lidocaine 1 % 0.1-1 mL (has no administration in time range)   lidocaine (LMX4) cream (has no administration in time range)   sodium chloride (PF) 0.9% PF flush 3 mL (has no administration in time range)   sodium chloride (PF) 0.9% PF flush 3 mL (has no administration in time range)   sodium chloride 0.9% BOLUS 1,000 mL (1,000 mLs Intravenous $New Bag 12/13/24 1225)       Patient's response to treatments and/or interventions: tolerated    To be done/followed up on inpatient unit:  per admitting    Does this patient have any cognitive concerns?: Forgetful    Activity level - Baseline/Home:  Independent  Activity Level - Current:   Stand with assist x2 and Walker    Patient's Preferred language: English   Needed?: No    Isolation: Special  Infection: COVID-19  Patient tested for COVID 19 prior to admission: YES  Bariatric?: No    Vital Signs:   Vitals:    12/13/24 1244 12/13/24 1300 12/13/24 1330 12/13/24 1400   BP:  112/84 98/58 91/58   Pulse: 79 75 75 69   Resp: 14  17 18   Temp:       TempSrc:       SpO2: 98%  96% 95%       Cardiac Rhythm: a.fib    Was the PSS-3 completed:   Yes  What interventions are required if any?               Family Comments:  present in ED  OBS brochure/video discussed/provided to patient/family: No    For the majority of the shift this patient's behavior was Green.   Behavioral interventions performed were care and rounding.    ED NURSE PHONE NUMBER: *[phone]

## 2024-12-14 ENCOUNTER — APPOINTMENT (OUTPATIENT)
Dept: PHYSICAL THERAPY | Facility: CLINIC | Age: 84
End: 2024-12-14
Attending: PHYSICIAN ASSISTANT
Payer: MEDICARE

## 2024-12-14 ENCOUNTER — APPOINTMENT (OUTPATIENT)
Dept: CARDIOLOGY | Facility: CLINIC | Age: 84
End: 2024-12-14
Attending: PHYSICIAN ASSISTANT
Payer: MEDICARE

## 2024-12-14 LAB
ANION GAP SERPL CALCULATED.3IONS-SCNC: 7 MMOL/L (ref 7–15)
ATRIAL RATE - MUSE: NORMAL BPM
BUN SERPL-MCNC: 19.7 MG/DL (ref 8–23)
CALCIUM SERPL-MCNC: 8.2 MG/DL (ref 8.8–10.4)
CHLORIDE SERPL-SCNC: 107 MMOL/L (ref 98–107)
CREAT SERPL-MCNC: 0.48 MG/DL (ref 0.51–0.95)
DIASTOLIC BLOOD PRESSURE - MUSE: NORMAL MMHG
EGFRCR SERPLBLD CKD-EPI 2021: >90 ML/MIN/1.73M2
ERYTHROCYTE [DISTWIDTH] IN BLOOD BY AUTOMATED COUNT: 13.2 % (ref 10–15)
GLUCOSE SERPL-MCNC: 88 MG/DL (ref 70–99)
HCO3 SERPL-SCNC: 27 MMOL/L (ref 22–29)
HCT VFR BLD AUTO: 35.7 % (ref 35–47)
HGB BLD-MCNC: 11.5 G/DL (ref 11.7–15.7)
INTERPRETATION ECG - MUSE: NORMAL
LVEF ECHO: NORMAL
MAGNESIUM SERPL-MCNC: 1.8 MG/DL (ref 1.7–2.3)
MCH RBC QN AUTO: 30.7 PG (ref 26.5–33)
MCHC RBC AUTO-ENTMCNC: 32.2 G/DL (ref 31.5–36.5)
MCV RBC AUTO: 95 FL (ref 78–100)
P AXIS - MUSE: NORMAL DEGREES
PLATELET # BLD AUTO: 127 10E3/UL (ref 150–450)
POTASSIUM SERPL-SCNC: 3.4 MMOL/L (ref 3.4–5.3)
POTASSIUM SERPL-SCNC: 3.4 MMOL/L (ref 3.4–5.3)
POTASSIUM SERPL-SCNC: 4.1 MMOL/L (ref 3.4–5.3)
PR INTERVAL - MUSE: NORMAL MS
QRS DURATION - MUSE: 72 MS
QT - MUSE: 388 MS
QTC - MUSE: 424 MS
R AXIS - MUSE: 89 DEGREES
RBC # BLD AUTO: 3.75 10E6/UL (ref 3.8–5.2)
SODIUM SERPL-SCNC: 141 MMOL/L (ref 135–145)
SYSTOLIC BLOOD PRESSURE - MUSE: NORMAL MMHG
T AXIS - MUSE: 75 DEGREES
VENTRICULAR RATE- MUSE: 72 BPM
WBC # BLD AUTO: 4.8 10E3/UL (ref 4–11)

## 2024-12-14 PROCEDURE — 85027 COMPLETE CBC AUTOMATED: CPT | Performed by: PHYSICIAN ASSISTANT

## 2024-12-14 PROCEDURE — G0378 HOSPITAL OBSERVATION PER HR: HCPCS

## 2024-12-14 PROCEDURE — 99232 SBSQ HOSP IP/OBS MODERATE 35: CPT | Performed by: HOSPITALIST

## 2024-12-14 PROCEDURE — 82947 ASSAY GLUCOSE BLOOD QUANT: CPT | Performed by: PHYSICIAN ASSISTANT

## 2024-12-14 PROCEDURE — 80048 BASIC METABOLIC PNL TOTAL CA: CPT | Performed by: HOSPITALIST

## 2024-12-14 PROCEDURE — 255N000002 HC RX 255 OP 636: Performed by: PHYSICIAN ASSISTANT

## 2024-12-14 PROCEDURE — 250N000013 HC RX MED GY IP 250 OP 250 PS 637: Performed by: PHYSICIAN ASSISTANT

## 2024-12-14 PROCEDURE — 36415 COLL VENOUS BLD VENIPUNCTURE: CPT | Performed by: HOSPITALIST

## 2024-12-14 PROCEDURE — 99203 OFFICE O/P NEW LOW 30 MIN: CPT | Performed by: INTERNAL MEDICINE

## 2024-12-14 PROCEDURE — 83735 ASSAY OF MAGNESIUM: CPT | Performed by: HOSPITALIST

## 2024-12-14 PROCEDURE — 84132 ASSAY OF SERUM POTASSIUM: CPT | Performed by: HOSPITALIST

## 2024-12-14 PROCEDURE — 82435 ASSAY OF BLOOD CHLORIDE: CPT | Performed by: PHYSICIAN ASSISTANT

## 2024-12-14 PROCEDURE — 36415 COLL VENOUS BLD VENIPUNCTURE: CPT | Performed by: PHYSICIAN ASSISTANT

## 2024-12-14 PROCEDURE — 93306 TTE W/DOPPLER COMPLETE: CPT | Mod: 26 | Performed by: INTERNAL MEDICINE

## 2024-12-14 PROCEDURE — 999N000208 ECHOCARDIOGRAM COMPLETE

## 2024-12-14 PROCEDURE — 97530 THERAPEUTIC ACTIVITIES: CPT | Mod: GP | Performed by: PHYSICAL THERAPIST

## 2024-12-14 PROCEDURE — 82374 ASSAY BLOOD CARBON DIOXIDE: CPT | Performed by: PHYSICIAN ASSISTANT

## 2024-12-14 PROCEDURE — 250N000013 HC RX MED GY IP 250 OP 250 PS 637: Performed by: INTERNAL MEDICINE

## 2024-12-14 PROCEDURE — 97161 PT EVAL LOW COMPLEX 20 MIN: CPT | Mod: GP | Performed by: PHYSICAL THERAPIST

## 2024-12-14 PROCEDURE — 97116 GAIT TRAINING THERAPY: CPT | Mod: GP | Performed by: PHYSICAL THERAPIST

## 2024-12-14 PROCEDURE — 99214 OFFICE O/P EST MOD 30 MIN: CPT | Performed by: INTERNAL MEDICINE

## 2024-12-14 PROCEDURE — 250N000013 HC RX MED GY IP 250 OP 250 PS 637: Performed by: HOSPITALIST

## 2024-12-14 RX ORDER — POTASSIUM CHLORIDE 1.5 G/1.58G
20 POWDER, FOR SOLUTION ORAL ONCE
Status: COMPLETED | OUTPATIENT
Start: 2024-12-14 | End: 2024-12-14

## 2024-12-14 RX ORDER — MAGNESIUM OXIDE 400 MG/1
400 TABLET ORAL EVERY 4 HOURS
Status: COMPLETED | OUTPATIENT
Start: 2024-12-14 | End: 2024-12-14

## 2024-12-14 RX ADMIN — MEMANTINE 10 MG: 10 TABLET ORAL at 08:51

## 2024-12-14 RX ADMIN — PERFLUTREN 10 ML: 6.52 INJECTION, SUSPENSION INTRAVENOUS at 08:12

## 2024-12-14 RX ADMIN — Medication 5 MG: at 22:12

## 2024-12-14 RX ADMIN — ACETAMINOPHEN 650 MG: 325 TABLET, FILM COATED ORAL at 01:33

## 2024-12-14 RX ADMIN — POTASSIUM CHLORIDE 20 MEQ: 1.5 POWDER, FOR SOLUTION ORAL at 08:51

## 2024-12-14 RX ADMIN — DONEPEZIL HYDROCHLORIDE 5 MG: 5 TABLET, FILM COATED ORAL at 21:17

## 2024-12-14 RX ADMIN — Medication 400 MG: at 12:50

## 2024-12-14 RX ADMIN — MEMANTINE 10 MG: 10 TABLET ORAL at 21:17

## 2024-12-14 RX ADMIN — Medication 400 MG: at 08:51

## 2024-12-14 ASSESSMENT — ACTIVITIES OF DAILY LIVING (ADL)
ADLS_ACUITY_SCORE: 59
ADLS_ACUITY_SCORE: 58
ADLS_ACUITY_SCORE: 49
ADLS_ACUITY_SCORE: 45
ADLS_ACUITY_SCORE: 62
ADLS_ACUITY_SCORE: 49
ADLS_ACUITY_SCORE: 62
ADLS_ACUITY_SCORE: 49
ADLS_ACUITY_SCORE: 49
ADLS_ACUITY_SCORE: 62
ADLS_ACUITY_SCORE: 49
ADLS_ACUITY_SCORE: 59
ADLS_ACUITY_SCORE: 59
ADLS_ACUITY_SCORE: 49
ADLS_ACUITY_SCORE: 49
ADLS_ACUITY_SCORE: 62
ADLS_ACUITY_SCORE: 62
ADLS_ACUITY_SCORE: 45
ADLS_ACUITY_SCORE: 49
ADLS_ACUITY_SCORE: 44
ADLS_ACUITY_SCORE: 49

## 2024-12-14 NOTE — PLAN OF CARE
Summary: [id number] 12/13/24 syncope, generalized weakness  Orientation: A/Ox4 forgetful   Observation Goals (met & not met): not met  Activity Level: Ax2 GB pivot to bedside commode  Fall Risk: yes  Behavior & Aggression Tool Color: green  Pain Management: denies  ABNL VS/O2: VSS on RA  ABNL Lab/BG: n/a  Diet: mod carb  Bowel/Bladder: purwick in place  Drains/Devices: PIV infusing NS at 100 mL/hr  Skin: bruising BUE, dry flaky feet, redness on bottom mepi in place   Tests/Procedures for next shift: n/a  Anticipated DC date: TBD  Other Important Info: tele a fib

## 2024-12-14 NOTE — PROGRESS NOTES
"   12/14/24 1127   Appointment Info   Signing Clinician's Name / Credentials (PT) Dee Dee Jones, PT   Living Environment   People in Home spouse   Current Living Arrangements house  ([facility] per patient's description)   Transportation Anticipated family or friend will provide   Living Environment Comments Multi level townhome. Pt. very [place] and not answering exactly how many steps. Reports she does flights of stairs in home.   Self-Care   Usual Activity Tolerance good   Current Activity Tolerance moderate   Regular Exercise No   Equipment Currently Used at Home none   Fall history within last six months yes   Number of times patient has fallen within last six months 2   Activity/Exercise/Self-Care Comment Reports she slipped on ice getting out of car and hit her head. Does appear to have some memory deficits. Unsure of accuracy. States, \"My  remembers things for me.\"   General Information   Onset of Illness/Injury or Date of Surgery 12/13/24   Referring Physician Muriel Rose PA-C   Patient/Family Therapy Goals Statement (PT) Patient stated she will not go to a TCU plans to d/c to home. States she does not feel ready to leave today however. She did communicate this with physician who arrived.   Pertinent History of Current Problem (include personal factors and/or comorbidities that impact the POC) Per chart: Carmen Zhang is a 84 year old female with history of hypertension, hyperlipidemia, Meniere's disease, syncope, and Alzheimer's disease who presents to the ED via EMS for evaluation of syncope. Nurse present reports Carmen had a fall 2 days ago and hit her head. She was sent home and has since had multiple episodes of syncope and falls. She does not remember the events surrounding the falls and her  has found her on the floor. EMS was called yesterday but Carmen was not brought into the ED. She was positive for orthostatics today. She has not been eating and drinking normally, per EMS. " "Carmen denies any complaints of pain or lightheadedness.   Existing Precautions/Restrictions fall   General Observations Lying in bed; stated, \"Do we have to get up?\" but was agreeable   Cognition   Affect/Mental Status (Cognition)   (some memory deficits noted; this is also noted in hx.)   Orientation Status (Cognition) oriented to;person;situation   Follows Commands (Cognition) follows one-step commands;75-90% accuracy   Safety Deficit (Cognition) awareness of need for assistance;insight into deficits/self-awareness;judgment;problem-solving   Memory Deficit (Cognition) other (see comments)  (Alzheimer's per chart)   Pain Assessment   Patient Currently in Pain No   Integumentary/Edema   Integumentary/Edema no deficits were identifed   Posture    Posture Forward head position   Range of Motion (ROM)   Range of Motion ROM is WFL   Strength (Manual Muscle Testing)   Strength (Manual Muscle Testing) strength is WFL   Bed Mobility   Comment, (Bed Mobility) Supine to sit on EOB with HOB raised slightly and mostly CGA; slight Min at one point   Transfers   Comment, (Transfers) STS into WW with CGA; increased time and effort   Gait/Stairs (Locomotion)   Comment, (Gait/Stairs) Pt. ambulated 5' for eval with WW and close CGA   Balance   Balance Comments Trial ambulating without AD with widened MARBIN and increased lateral sway; decreased step length; mild unsteadiness   Sensory Examination   Sensory Perception patient reports no sensory changes   Coordination   Coordination no deficits were identified   Muscle Tone   Muscle Tone no deficits were identified   Clinical Impression   Criteria for Skilled Therapeutic Intervention Yes, treatment indicated   PT Diagnosis (PT) Impaired functional mobility   Influenced by the following impairments balance deficits, generalized weakness   Functional limitations due to impairments decreased I and increased need for AD in functional mobility   Clinical Presentation (PT Evaluation Complexity) " stable   Clinical Presentation Rationale per clinical judgement   Clinical Decision Making (Complexity) low complexity   Planned Therapy Interventions (PT) balance training;bed mobility training;gait training;home exercise program;patient/family education;stair training;strengthening;transfer training;progressive activity/exercise   Risk & Benefits of therapy have been explained evaluation/treatment results reviewed;care plan/treatment goals reviewed;risks/benefits reviewed;current/potential barriers reviewed;participants voiced agreement with care plan;participants included;patient   PT Total Evaluation Time   PT Eval, Low Complexity Minutes (51777) 15   Therapy Certification   Start of care date 12/14/24   Certification date from 12/14/24   Certification date to 12/21/24   Medical Diagnosis New afib, covid, falls   Physical Therapy Goals   PT Frequency Daily   PT Predicted Duration/Target Date for Goal Attainment 12/18/24   PT: Bed Mobility Independent;Supine to/from sit   PT: Transfers Modified independent;Sit to/from stand   PT: Gait Supervision/stand-by assist;Rolling walker;Assistive device;Straight cane;150 feet   PT: Stairs Supervision/stand-by assist;Assistive device;4 stairs;Rail on right   Therapeutic Activity   Therapeutic Activities: dynamic activities to improve functional performance Minutes (23535) 15   Symptoms Noted During/After Treatment None   Treatment Detail/Skilled Intervention No dizziness throughout session. Patient performed STS from bed and low toilet with CGA. Sat on toilet up to 10 min.; SBA provided. Pt. able to perform own pericares in sitting; wipes attained. Following directions. Able to manage own underwear with CGA. Ended in chair with chair alarm on. Doctor came to speak with patient. Patient adamant about going home at D/C.   Gait Training   Gait Training Minutes (01101) 9   Symptoms Noted During/After Treatment (Gait Training) none   Treatment Detail/Skilled Intervention Gait  "initiated in room using WW approx. 45' and no AD 25'. All with CGA. Safer with WW but does place too far forward and tends to \"leave\" it for instance while washing hands. Decreased jefferson, increased MARBIN and decreased step length when not using WW. Does not have any AD at home.   Distance in Feet 45', 25'   PT Discharge Planning   PT Plan amb. with WW, possibly try SEC or no AD, stairs with proper masking in [place], take orthostatic BP's/HR   PT Discharge Recommendation (DC Rec) home with assist;home with home care physical therapy;Transitional Care Facility   PT Rationale for DC Rec Patient is mobilizing with mostly CGA in room with WW though does leave it at times. Reports she \"doesn't need it for home.\" DIscussed that after falls and with Covid she may be weaker. Pt. adamant that she will not go to TCU. If  can give CGA level of support, feel patient can d/c to home with home PT to work on strengthening. If  cannot give this support or if pt. will be alone during the day a TCU should be considered.   PT Brief overview of current status CGA with WW; trial without and still CGA but more guarded gait; recommend use WW. Goals of therapy will be to address safe mobility and make recs for d/c to next level of care. Pt and RN will continue to follow all falls risk precautions as documented by RN staff while hospitalized   PT Equipment Needed at Discharge   (may need WW or SEC (pending trial))   Physical Therapy Time and Intention   Timed Code Treatment Minutes 24   Total Session Time (sum of timed and untimed services) 39   [facility]  OUTPATIENT PHYSICAL THERAPY EVALUATION  PLAN OF TREATMENT FOR OUTPATIENT REHABILITATION  (COMPLETE FOR INITIAL CLAIMS ONLY)  Patient's Last Name, First Name, M.I.  YOB: 1940  VicenteDawna                           Provider's Name  [facility] Medical Record No.  [id number]                    "          Onset Date:  12/13/24   Start of Care Date:  12/14/24   Type:     _X_PT   ___OT   ___SLP Medical Diagnosis:  New afib, covid, falls              PT Diagnosis:  Impaired functional mobility Visits from SOC:  1     See note for plan of treatment, functional goals and certification details    I CERTIFY THE NEED FOR THESE SERVICES FURNISHED UNDER        THIS PLAN OF TREATMENT AND WHILE UNDER MY CARE     (Physician co-signature of this document indicates review and certification of the therapy plan).

## 2024-12-14 NOTE — CONSULTS
[facility]    Electrophysiology Consultation     Date of Admission:  12/13/2024  Date of Consult: 12/14/24    Assessment & Plan   Carmen Zhang is a 84 year old female who was admitted on 12/13/2024. We were asked to see the patient for atrial fibrillation.  Her rates have been controlled without any additional AV dante agents.  Syncope and falls likely multifactorial due to orthostatic hypotension, COVID infection, and new onset atrial fibrillation/flutter.  She is asymptomatic from the atrial fibrillation/flutter, which may have been triggered from her COVID infection, dehydration, hypokalemia.  Her rates are currently well-controlled without any AV dante agents.  We will plan on getting a 2-week Zio patch monitor to assess her A-fib burden.  Echocardiogram from this admission showed preserved LV function and no significant valvular disease.  There is mild to moderate biatrial dilation.    The patient's CHADS VASc is 4 (female, age, HTN), which warrants long term anticoagulation.  However given her recent recurrent falls, I would recommend holding off on anticoagulation for now.  We will reevaluate this at her outpatient follow-up.      - Hold off on anticoagulation given recent falls  - 2 week Ziopatch monitor after discharge  - Discontinue outpatient hydrochlorothiazide given hypotension and hypokalemia  - Follow up in cardiology clinic in 4 weeks      Quinton Rivero MD        Code Status    Full Code    Reason for Consult   Reason for consult: Consult performed at the request of the attending physician, Mae, Ronal Troncoso,*, for evaluation of atrial fibrillation      Chief Complaint   Syncope    History is obtained from the patient and EPIC chart.      History of Present Illness   Carmen Zhang is a 84 year old female with hypertension, hyperlipidemia, Alzheimer's disease with cognitive impairment, who presents with syncope and falls.  She had multiple episodes of syncope and  falls in the last 2 days.  She was found on the floor by her  on 12/12/2024, EMS was called and she reportedly had positive orthostatics and was not brought to the ED at that time.  Since then she has had 2-3 more episodes of unwitnessed falls while going to the bathroom.  She was found to have positive orthostatic blood pressures, was positive for COVID and also noted to have new onset atrial fibrillation/flutter with controlled ventricular rates.  He was given IV fluid bolus and potassium replacement for hypokalemia (K3.3).  Admission EKG shows atrial fibrillation with heart rate of 71 bpm.  Repeat EKG showed atypical flutter versus atrial tachycardia.  She had an echocardiogram from 4/2023 which showed normal heart function and no significant valvular disease.    Of note the patient had a fall 2 days prior to admission and was evaluated in the ED on 12/11/2024.  This episode occurred while she was getting out of the car, slipped and fell backwards, hitting her head.  According to the ED notes at that time she did not have loss of consciousness.  CT head did not show any acute changes.  She had a scalp laceration which was repaired.    She feels well this morning.  She is asymptomatic from the atrial fibrillation.  She recalls that she slipped and fell a couple days ago.  She states that her most recent fall was when she was getting up from the bathroom, and she is not certain if she lost consciousness.  She states she has been very healthy most of her life and does not think the atrial fibrillation will continue to be an issue for her.  She reports no chest pain, shortness of breath, palpitations, or dizziness.        Past Medical History   I have reviewed this patient's medical history and updated it with pertinent information if needed.   Past Medical History:   Diagnosis Date    Benign essential hypertension     Female stress incontinence     Herpes simplex without mention of complication      Hyperlipidemia     Insomnia     Lumbago 08/17/2006    MCI (mild cognitive impairment)     Meniere's disease     Osteoporosis     Other ovarian failure     Personal history of other disorders of nervous system and sense organs     Sensorineural hearing loss, unilateral        Past Surgical History   I have reviewed this patient's surgical history and updated it with pertinent information if needed.  Past Surgical History:   Procedure Laterality Date    BREAST BIOPSY, RT/LT      HYSTERECTOMY, VAGINAL      ovaries left    ZZC APPENDECTOMY      ZZC NONSPECIFIC PROCEDURE  33 yrs old    removal of growth on heart     ZZC NONSPECIFIC PROCEDURE      bladder suspension    Z NONSPECIFIC PROCEDURE      vein stripping       Prior to Admission Medications   Prior to Admission Medications   Prescriptions Last Dose Informant Patient Reported? Taking?   Ascorbic Acid (VITAMIN C PO)  Spouse/Significant Other Yes Yes   Sig: Take 1 tablet by mouth daily as needed (when feeling under the weather).   Coenzyme Q10 (COQ10 PO)  Spouse/Significant Other Yes Yes   Sig: TAKE 2 TEASPOONS OF QUNOL-LIQUID DAILY   Lutein-Zeaxanthin 25-5 MG CAPS  Spouse/Significant Other Yes Yes   Sig: Take 1 capsule by mouth daily   Omega-3 Fatty Acids (FISH OIL) 1200 MG capsule  Spouse/Significant Other Yes Yes   Sig: Take 1,200 mg by mouth daily   acyclovir (ZOVIRAX) 400 MG tablet  Spouse/Significant Other Yes Yes   Sig: Take 400 mg by mouth as needed (cold sores).   aspirin 81 MG EC tablet  Spouse/Significant Other Yes Yes   Sig: Take 81 mg by mouth at bedtime.   atorvastatin (LIPITOR) 10 MG tablet  Spouse/Significant Other No Yes   Sig: Take 1 tablet (10 mg) by mouth daily Pt is requesting that she only receive [employer].   calcium carbonate 500 mg, elemental, (OSCAL) 500 MG tablet  Spouse/Significant Other Yes Yes   Sig: Take 1 tablet by mouth daily.   calcium carbonate-vitamin D (CALTRATE) 600-10 MG-MCG per tablet  Spouse/Significant Other Yes Yes    Sig: Take 2 tablets by mouth daily.   carboxymethylcellulose PF (REFRESH PLUS) 0.5 % ophthalmic solution  Spouse/Significant Other Yes Yes   Sig: Place 1 drop into both eyes 4 times daily as needed for dry eyes Uncertain of otc product   cholecalciferol (VITAMIN D3) 25 mcg (1000 units) capsule  Spouse/Significant Other Yes Yes   Sig: Take 2 capsules by mouth daily.   cyanocobalamin (VITAMIN B-12) 500 MCG SUBL sublingual tablet  Spouse/Significant Other Yes Yes   Sig: Place 500 mcg under the tongue once a week. On Mondays   donepezil (ARICEPT) 5 MG tablet  Spouse/Significant Other Yes Yes   Sig: Take 5 mg by mouth At Bedtime   hydrochlorothiazide (HYDRODIURIL) 25 MG tablet  Spouse/Significant Other Yes Yes   Sig: Take 25 mg by mouth daily   melatonin 5 MG tablet  Spouse/Significant Other Yes Yes   Sig: Take 5 mg by mouth nightly as needed for sleep.   memantine (NAMENDA) 10 MG tablet  Spouse/Significant Other Yes Yes   Sig: Take 10 mg by mouth 2 times daily.   multivitamin w/minerals (THERA-VIT-M) tablet  Spouse/Significant Other Yes Yes   Sig: Take 1 tablet by mouth daily   polyethylene glycol (MIRALAX) powder  Spouse/Significant Other No Yes   Sig: Take 17 g (1 capful) by mouth daily INDICATION: CONSTIPATION, TO ACHIEVE 1-2 SOFT BMs PER DAY   potassium citrate (UROCIT-K) 10 MEQ (1080 MG) CR tablet  Spouse/Significant Other Yes Yes   Sig: Take 10 mEq by mouth 3 times daily (with meals)   vitamin E (TOCOPHEROL) 1000 units (450 mg) CAPS capsule  Spouse/Significant Other Yes Yes   Sig: Take 1,000 Units by mouth daily.   zinc gluconate 50 MG tablet  Spouse/Significant Other Yes Yes   Sig: Take 50 mg by mouth daily Unsure of which zinc formulation      Facility-Administered Medications: None         Medications   Current Facility-Administered Medications   Medication Dose Route Frequency Provider Last Rate Last Admin    No anticoagulants IF patient has had acute trauma/surgery or recent intracranial, GI or urinary  tract bleeding.    Other DOES NOT GO TO MAR Muriel Rose PA-C         Current Facility-Administered Medications   Medication Dose Route Frequency Provider Last Rate Last Admin    donepezil (ARICEPT) tablet 5 mg  5 mg Oral At Bedtime Muriel Rose PA-C   5 mg at 12/13/24 2218    magnesium oxide (MAG-OX) tablet 400 mg  400 mg Oral Q4H Ronal Wall MD   400 mg at 12/14/24 0851    memantine (NAMENDA) tablet 10 mg  10 mg Oral BID Muriel Rose PA-C   10 mg at 12/14/24 0851    sodium chloride (PF) 0.9% PF flush 3 mL  3 mL Intracatheter Q8H Muriel Rose PA-C   3 mL at 12/14/24 0851       Allergies   Allergies   Allergen Reactions    Diatrizoate Hives and Itching    Dye [Contrast Dye] Hives and Itching    Iodine Hives    Seasonal Allergies        Social History   I have updated and reviewed the following Social History Narrative:   Social History     Social History Narrative    Not on file        Family History   I have reviewed this patient's family history and updated it with pertinent information if needed.   Family History   Problem Relation Age of Onset    Cancer Mother         ESOPHAGEAL, SMOKER    C.A.D. Mother         MI IN HER 70s    C.A.D. Father         MI IN HIS 70s    Prostate Cancer Father     Neurologic Disorder Father         PARKINSON'S    Cerebrovascular Disease Maternal Grandmother     Blood Disease Maternal Grandfather     Diabetes Maternal Aunt     C.A.D. Brother         11 STENTS AND BYPASS, IN HIS FIFTIES    Obesity Brother     Connective Tissue Disorder Daughter         HIP PRESENTATION    Diabetes Maternal Aunt     Connective Tissue Disorder Daughter         ARTHRITIS       Review of Systems   A complete 10-point review of systems was done and is negative with the exceptions noted in HPI.      Physical Exam   Temp: 97.6  F (36.4  C) Temp src: Oral BP: 127/73 Pulse: 62   Resp: 18 SpO2: 96 % O2 Device: None (Room air)    Vital Signs with  Ranges  Temp:  [97.6  F (36.4  C)-98.2  F (36.8  C)] 97.6  F (36.4  C)  Pulse:  [62-86] 62  Resp:  [14-18] 18  BP: ()/(53-84) 127/73  SpO2:  [93 %-98 %] 96 %  0 lbs 0 oz    General: Pleasant, no acute distress, sitting up in a chair  Resp: Breathing comfortably on RA  Card: Irregular   Extremities: No significant edema   Neuro: Awake, alert, answers questions appropriately       Diagnostics:  I personally reviewed the patient's EKG, lab work, and pertinent imaging    Recent Labs   Lab Test 12/14/24  0723 12/13/24  1224 12/11/24 2109 05/01/23  0727   WBC 4.8 5.6 7.6  --    HGB 11.5* 12.0 14.6  --    HCT 35.7 35.5 44.5  --    MCV 95 94 94  --    * 123* 154  --     145 137  --    CO2 27 30* 28  --    BUN 19.7 26.0* 16.2  --    CR 0.48* 0.73 0.66  --    PTT  --   --   --  25       Last Comprehensive Metabolic Panel:  Lab Results   Component Value Date     12/14/2024    POTASSIUM 3.4 12/14/2024    CHLORIDE 107 12/14/2024    CO2 27 12/14/2024    ANIONGAP 7 12/14/2024    GLC 88 12/14/2024    BUN 19.7 12/14/2024    CR 0.48 (L) 12/14/2024    GFRESTIMATED >90 12/14/2024    FLAKO 8.2 (L) 12/14/2024         Telemetry:  Afib rate 70's        Results for orders placed or performed during the hospital encounter of 12/13/24 (from the past 24 hours)   CBC with platelets differential    Narrative    The following orders were created for panel order CBC with platelets differential.  Procedure                               Abnormality         Status                     ---------                               -----------         ------                     CBC with platelets and d...[[id number]]  Abnormal            Final result               RBC and Platelet Morphology[[id number]]                                                   Please view results for these tests on the individual orders.   Magnesium   Result Value Ref Range    Magnesium 1.7 1.7 - 2.3 mg/dL   Comprehensive metabolic panel   Result Value Ref Range     Sodium 145 135 - 145 mmol/L    Potassium 3.3 (L) 3.4 - 5.3 mmol/L    Carbon Dioxide (CO2) 30 (H) 22 - 29 mmol/L    Anion Gap 10 7 - 15 mmol/L    Urea Nitrogen 26.0 (H) 8.0 - 23.0 mg/dL    Creatinine 0.73 0.51 - 0.95 mg/dL    GFR Estimate 80 >60 mL/min/1.73m2    Calcium 8.7 (L) 8.8 - 10.4 mg/dL    Chloride 105 98 - 107 mmol/L    Glucose 105 (H) 70 - 99 mg/dL    Alkaline Phosphatase 46 40 - 150 U/L    AST 28 0 - 45 U/L    ALT 20 0 - 50 U/L    Protein Total 5.8 (L) 6.4 - 8.3 g/dL    Albumin 3.4 (L) 3.5 - 5.2 g/dL    Bilirubin Total 0.4 <=1.2 mg/dL   [facility] Draw    Narrative    The following orders were created for panel order [facility] Draw.  Procedure                               Abnormality         Status                     ---------                               -----------         ------                     Extra Blue Top Tube[[id number]]                              Final result                 Please view results for these tests on the individual orders.   CBC with platelets and differential   Result Value Ref Range    WBC Count 5.6 4.0 - 11.0 10e3/uL    RBC Count 3.77 (L) 3.80 - 5.20 10e6/uL    Hemoglobin 12.0 11.7 - 15.7 g/dL    Hematocrit 35.5 35.0 - 47.0 %    MCV 94 78 - 100 fL    MCH 31.8 26.5 - 33.0 pg    MCHC 33.8 31.5 - 36.5 g/dL    RDW 13.2 10.0 - 15.0 %    Platelet Count 123 (L) 150 - 450 10e3/uL    % Neutrophils 68 %    % Lymphocytes 17 %    % Monocytes 14 %    % Eosinophils 0 %    % Basophils 0 %    % Immature Granulocytes 0 %    NRBCs per 100 WBC 0 <1 /100    Absolute Neutrophils 3.8 1.6 - 8.3 10e3/uL    Absolute Lymphocytes 1.0 0.8 - 5.3 10e3/uL    Absolute Monocytes 0.8 0.0 - 1.3 10e3/uL    Absolute Eosinophils 0.0 0.0 - 0.7 10e3/uL    Absolute Basophils 0.0 0.0 - 0.2 10e3/uL    Absolute Immature Granulocytes 0.0 <=0.4 10e3/uL    Absolute NRBCs 0.0 10e3/uL   Extra Blue Top Tube   Result Value Ref Range    Hold Specimen JIC    TSH with free T4 reflex   Result Value Ref Range    TSH 0.84 0.30 -  4.20 uIU/mL   Influenza A/B, RSV and SARS-CoV2 PCR (COVID-19) Nasopharyngeal    Specimen: Nasopharyngeal; Swab   Result Value Ref Range    Influenza A PCR Negative Negative    Influenza B PCR Negative Negative    RSV PCR Negative Negative    SARS CoV2 PCR Positive (A) Negative    Narrative    Testing was performed using the Xpert Xpress CoV2/Flu/RSV Assay on the [employer] GeneXpert Instrument. This test should be ordered for the detection of SARS-CoV2, influenza, and RSV viruses in individuals with signs and symptoms of respiratory tract infection. This test is for in vitro diagnostic use under the [place] FDA for laboratories certified under CLIA to perform high or moderate complexity testing. This test has been [place] FDA cleared. A negative result does not rule out the presence of PCR inhibitors in the specimen or target RNA in concentration below the limit of detection for the assay. If only one viral target is positive but coinfection with multiple targets is suspected, the sample should be re-tested with another FDA cleared, approved, or authorized test, if coninfection would change clinical management. This test was validated by the [facility]. These laboratories are certified under the Clinical Laboratory Improvement Amendments of 1988 (CLIA-88) as qualified to perfom high complexity laboratory testing.   EKG 12 lead   Result Value Ref Range    Systolic Blood Pressure  mmHg    Diastolic Blood Pressure  mmHg    Ventricular Rate 71 BPM    Atrial Rate  BPM    UT Interval  ms    QRS Duration 72 ms     ms    QTc 406 ms    P Axis  degrees    R AXIS 77 degrees    T Axis 53 degrees    Interpretation ECG       Atrial fibrillation  Nonspecific T wave abnormality  Abnormal ECG  When compared with ECG of 29-Apr-2023 21:50,  Atrial fibrillation has replaced Sinus rhythm  Nonspecific T wave abnormality now evident in Inferior leads  Nonspecific T wave abnormality, worse in Anterolateral leads  Confirmed by  GENERATED REPORT, COMPUTER ([id number]),  Rachele Banks ([id number]) on 12/13/2024 3:08:48 PM     CT Head w/o Contrast    Narrative    CT SCAN OF THE HEAD WITHOUT CONTRAST   12/13/2024 1:19 PM     HISTORY: recurrent falls    TECHNIQUE:  Axial images of the head and coronal reformations without  IV contrast material. Radiation dose for this scan was reduced using  automated exposure control, adjustment of the mA and/or kV according  to patient size, or iterative reconstruction technique.    COMPARISON: 12/11/2024    FINDINGS: There is no evidence of intracranial hemorrhage, mass, acute  infarct or anomaly. The ventricles are normal in size, shape and  configuration. Mild diffuse parenchymal volume loss. Mild patchy  periventricular white matter hypodensities which are nonspecific, but  likely related to chronic microvascular ischemic disease.     No significant paranasal sinus or mastoid inflammatory changes. Prior  left canal wall up mastoidectomy. The bony calvarium and bones of the  skull base appear intact. Left parietal scalp hematoma.      Impression    IMPRESSION:   No acute intracranial abnormality.      LEVI MONDRAGON MD         SYSTEM ID:  [id number]   EKG 12 lead   Result Value Ref Range    Systolic Blood Pressure  mmHg    Diastolic Blood Pressure  mmHg    Ventricular Rate 72 BPM    Atrial Rate  BPM    NJ Interval  ms    QRS Duration 72 ms     ms    QTc 424 ms    P Axis  degrees    R AXIS 89 degrees    T Axis 75 degrees    Interpretation ECG       Atrial fibrillation with a competing junctional pacemaker  Abnormal ECG  When compared with ECG of 13-Dec-2024 12:36, (unconfirmed)  No significant change was found     XR Chest 2 Views    Narrative    EXAM: XR CHEST 2 VIEWS  LOCATION: [facility]  DATE: 12/13/2024    INDICATION: Covid infection, r o infiltrate  COMPARISON: 5/22/2023 and 4/15/2023      Impression    IMPRESSION: Shallow inspiration exaggerates heart size, pulmonary  vascularity and bronchopulmonary lung markings. Thin band of chronic linear atelectasis right lower lung laterally unchanged. Lungs otherwise clear. Heart size and pulmonary vascularity   within normal limits and unchanged.   Potassium   Result Value Ref Range    Potassium 3.4 3.4 - 5.3 mmol/L   CBC with platelets   Result Value Ref Range    WBC Count 4.8 4.0 - 11.0 10e3/uL    RBC Count 3.75 (L) 3.80 - 5.20 10e6/uL    Hemoglobin 11.5 (L) 11.7 - 15.7 g/dL    Hematocrit 35.7 35.0 - 47.0 %    MCV 95 78 - 100 fL    MCH 30.7 26.5 - 33.0 pg    MCHC 32.2 31.5 - 36.5 g/dL    RDW 13.2 10.0 - 15.0 %    Platelet Count 127 (L) 150 - 450 10e3/uL   Basic metabolic panel   Result Value Ref Range    Sodium 141 135 - 145 mmol/L    Potassium 3.4 3.4 - 5.3 mmol/L    Chloride 107 98 - 107 mmol/L    Carbon Dioxide (CO2) 27 22 - 29 mmol/L    Anion Gap 7 7 - 15 mmol/L    Urea Nitrogen 19.7 8.0 - 23.0 mg/dL    Creatinine 0.48 (L) 0.51 - 0.95 mg/dL    GFR Estimate >90 >60 mL/min/1.73m2    Calcium 8.2 (L) 8.8 - 10.4 mg/dL    Glucose 88 70 - 99 mg/dL   Magnesium   Result Value Ref Range    Magnesium 1.8 1.7 - 2.3 mg/dL         Clinically Significant Risk Factors Present on Admission        # Hypokalemia: Lowest K = 3.3 mmol/L in last 2 days, will replace as needed    # Hypocalcemia: Lowest Ca = 8.2 mg/dL in last 2 days, will monitor and replace as appropriate     # Hypoalbuminemia: Lowest albumin = 3.4 g/dL at 12/13/2024 12:24 PM, will monitor as appropriate   # Drug Induced Platelet Defect: home medication list includes an antiplatelet medication   # Hypertension: Noted on problem list     # Dementia: noted on problem list               Cardiac Arrhythmia: Atrial fibrillation: Persistent

## 2024-12-14 NOTE — PROGRESS NOTES
[facility]    Medicine Progress Note - Hospitalist Service    Date of Admission:  12/13/2024    Assessment & Plan   Carmen Zhang is a 84 year old female with PMHx including cognitive impairment, hypertension, and hyperlipidemia who presented to the ED for the second time this week subsequent to a syncopal event. She was found to have positive orthostatic BPs, positive for Covid, and noted to be in new a fib/flutter with CVR. Registered under observation status for further evaluation and treatment.     Initial syncopal episode described as a mechanical fall while getting out of a car at which time she hit her head. She presented to the ED 12/11. CT head negative. Scalp lac repaired. Pt had recurrent, unwitnessed syncope 12/12 at which time EMS were called and pt reportedly had positive orthostatics, but was not brought to the ED. Subsequent to that, she had has 2-3 episodes of unwitnessed falls overnight while going to the bathroom. No CP, palpitations, prodromal dizziness. Pt does take ASA 81 mg po every day.     Syncope and collapse, multifactorial  Orthostatic hypotension   Generalized weakness  Posterior scalp lac s/p repair (12/11/2024)  *Likely multifactorial in the setting of orthostatic hypotension, new a fib/flutter with CVR, covid infection.   *CMP with K 3.3, otherwise largely unremarkable. CBC with mild thrombocytopenia. Covid positive. CT head negative. EKG with a fib with CVR.  *Echo 4/2023 with EF 60-65%, no RWMA, grade II or moderate DD, no significant valve disease.   *S/P 1 L IVF bolus   - Registered to observation.   - Telemetry -remains in A-fib but rate controlled.  - Received IV fluids, now discontinued.  Encourage oral intake.  - PT consulted, recommending home with assist versus TCU.  Patient's  is now sick as well and would not be able to assist at home.  Recommend TCU.  - Care transitions consulted regarding discharge planning, appreciate their  assistance.    Atrial fibrillation/flutter with CVR, new diagnosis  *[id number] score of at least 4.  - Cardiology consulted, appreciate their assistance.  - Recommend against anticoagulation at this time due to syncope and falls.  - Rate OK without intervention, continue to monitor.  - Telemetry.  - Transthoracic echocardiogram on 12/14/2024 showed normal LVEF, no significant valve disease.  - Plan for Zio patch after discharge and outpatient follow up with cardiology in 4 weeks.    Covid infection  Vaccinated X2, but no recent boosters. Sx of weakness but no significant respiratory symptoms.  - Offered Paxlovid, but patient/spouse declined.  - Not hypoxic, no role for steroids at this time.  - Ambulate, initiate DVT chemoprophylaxis in the AM if not getting out of bed.    Hypokalemia  - Replace and recheck per RN managed protocol.    Thrombocytopenia  Platelets 123 in the ED, likely reactive in the setting of viral infection.   - Platelets stable at 127 on 12/14, recheck in AM.    Hypertension  - PTA hydrochlorothiazide discontinued in setting of orthostatic hypotension and hypokalemia.  - Monitor blood pressure with routine vitals, will consider alternative blood pressure agent if needed going forward.    Hyperlipidemia  - Hold PTA atorvastatin while in observation status.    Cognitive impairment  Follows with Dr. Fish, last visit 8/19/24.   - Continue PTA Donepezil, Namenda, Seroquel  - Delirium prevention protocol in place.     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  - Noted.       Observation Goals: List all  goals to be met before discharge:, - Diagnostic tests and consults completed and resulted, - No further episodes of syncope and any new arrhythmia addressed with controlled heart rates, - Vital signs normal or at patient  baseline and orthostatic vitals are normal and patient not lightheaded with standing, - Tolerating oral intake to maintain hydration, - Safe disposition plan has been identified, - Nurse to notify provider when observation goals have been met and patient is ready for discharge.  Diet: Moderate Consistent Carb (60 g CHO per Meal) Diet    DVT Prophylaxis: Pneumatic Compression Devices and Ambulate every shift  Hoff Catheter: Not present  Lines: None     Cardiac Monitoring: ACTIVE order. Indication: Tachyarrhythmias, acute (48 hours)  Code Status: Full Code      Clinically Significant Risk Factors Present on Admission        # Hypokalemia: Lowest K = 3.3 mmol/L in last 2 days, will replace as needed        # Hypoalbuminemia: Lowest albumin = 3.4 g/dL at 12/13/2024 12:24 PM, will monitor as appropriate   # Drug Induced Platelet Defect: home medication list includes an antiplatelet medication   # Hypertension: Noted on problem list     # Dementia: noted on problem list                  Social Drivers of Health    Tobacco Use: Medium Risk (9/25/2024)    Received from [facility]    Patient History     Smoking Tobacco Use: Former     Smokeless Tobacco Use: Never    Received from [facility]    Social Connections          Disposition Plan     Medically Ready for Discharge: Anticipated Tomorrow pending continued improvement and safe discharge plan.             Ronal Wall MD  Hospitalist Service  [facility]  Securely message with [employer] (more info)  Text page via [facility] Paging/Directory   ______________________________________________________________________    Interval History   Carmen Zhang was seen today.  Overall she feels okay but a little tired and weak.  No chest pain, palpitations, shortness of breath, nausea, abdominal pain.  Family was present in the room this afternoon, discussed plan of care.    Physical Exam    Vital Signs: Temp: 97.9  F (36.6  C) Temp src: Oral BP: 106/66 Pulse: 72   Resp: 18 SpO2: 93 % O2 Device: None (Room air)    Weight: 0 lbs 0 oz    Constitutional: awake, alert, cooperative, no apparent distress, sitting up in a chair  Respiratory: no increased work of breathing, clear to auscultation bilaterally, no crackles or wheezing  Cardiovascular: irregular rhythm, normal rate, no murmur noted  GI: normal bowel sounds, soft, non-distended, non-tender  Skin: warm, dry  Musculoskeletal: no lower extremity pitting edema present  Neurologic: awake, alert, answers questions appropriately, moves all extremities    Medical Decision Making       40 MINUTES SPENT BY ME on the date of service doing chart review, history, exam, documentation & further activities per the note.      Data     I have personally reviewed the following data over the past 24 hrs:    4.8  \   11.5 (L)   / 127 (L)     141 107 19.7 /  88   4.1 27 0.48 (L) \     TSH: N/A T4: N/A A1C: N/A       Imaging results reviewed over the past 24 hrs:   Recent Results (from the past 24 hours)   XR Chest 2 Views    Narrative    EXAM: XR CHEST 2 VIEWS  LOCATION: [facility]  DATE: 2024    INDICATION: Covid infection, r o infiltrate  COMPARISON: 2023 and 4/15/2023      Impression    IMPRESSION: Shallow inspiration exaggerates heart size, pulmonary vascularity and bronchopulmonary lung markings. Thin band of chronic linear atelectasis right lower lung laterally unchanged. Lungs otherwise clear. Heart size and pulmonary vascularity   within normal limits and unchanged.   Echocardiogram Complete   Result Value    LVEF  55-60%    Narrative    [id number]  [id number]  [id number]  [id number]^ROBERT^CHAD^GABRIELA     [facility]  Echocardiography Laboratory  [address] [place] [address]     Name: MAGGY UP  MRN: [id number]  : 1940  Study Date: 2024 07:35 AM  Age: 84 yrs  Gender:  Female  Patient Location: [facility]  Reason For Study: Atrial Fibrillation  Ordering Physician: CHAD JONES  Referring Physician: Alison Hernandez  Performed By: Doroteo Jerez     BSA: 1.4 m2  Height: 62 in  Weight: 103 lb  HR: 74  BP: 100/55 mmHg  ______________________________________________________________________________  Procedure  Complete Echo Adult. Definity (NDC #29027-144) given intravenously.  ______________________________________________________________________________  Interpretation Summary     The left ventricle is normal in size.  There is normal left ventricular wall thickness.  The visual ejection fraction is 55-60%.  No regional wall motion abnormalities noted.  The right ventricle is normal in structure, function and size.  There is no hemodynamically significant valve disease  Compared to the prior study from 4/2023, there is no significant change  ______________________________________________________________________________  Left Ventricle  The left ventricle is normal in size. There is normal left ventricular wall  thickness. The visual ejection fraction is 55-60%. No regional wall motion  abnormalities noted.     Right Ventricle  The right ventricle is normal in structure, function and size.     Atria  The left atrium is mild to moderately dilated. The right atrium is mild to  moderately dilated.     Mitral Valve  The mitral valve is normal in structure and function. There is physiologic  mitral regurgitation.     Tricuspid Valve  The tricuspid valve is normal in structure and function. There is physiologic  tricuspid regurgitation. The right ventricular systolic pressure is  approximated at 27.3 mmHg plus the right atrial pressure.     Aortic Valve  The aortic valve is trileaflet. There is physiologic aortic regurgitation. No  hemodynamically significant valvular aortic stenosis.     Pulmonic Valve  The pulmonic valve is not well seen, but is grossly normal.      Vessels  Normal size aorta.     Pericardium  The pericardium appears normal.     ______________________________________________________________________________  MMode/2D Measurements & Calculations  IVSd: 0.76 cm  LVIDd: 3.7 cm  LVIDs: 2.8 cm  LVPWd: 0.97 cm  FS: 24.5 %  LV mass(C)d: 91.3 grams  LV mass(C)dI: 63.3 grams/m2     Ao root diam: 2.4 cm  LVOT diam: 1.8 cm  LVOT area: 2.6 cm2  Ao root diam index Ht(cm/m): 1.5  Ao root diam index BSA (cm/m2): 1.7  RV Base: 2.5 cm  RWT: 0.53  TAPSE: 1.8 cm     Doppler Measurements & Calculations  MV E max pepe: 98.5 cm/sec  MV dec slope: 399.7 cm/sec2  MV dec time: 0.25 sec  PA acc time: 0.13 sec  TR max pepe: 261.5 cm/sec  TR max P.3 mmHg  E/E' av.6  Lateral E/e': 9.1  Medial E/e': 8.2     RV S Pepe: 7.3 cm/sec     ______________________________________________________________________________  Report approved by: Margarita Alvarez MD on 2024 10:23 AM

## 2024-12-14 NOTE — PROGRESS NOTES
Observation goals  PRIOR TO DISCHARGE       Comments: List all  goals to be met before discharge:  - Diagnostic tests and consults completed and resulted- not met  - No further episodes of syncope and any new arrhythmia addressed with controlled heart rates- not met   - Vital signs normal or at patient baseline and orthostatic vitals are normal and patient not lightheaded with standing- not met  - Tolerating oral intake to maintain hydration- partially met  - Safe disposition plan has been identified- not met  - Nurse to notify provider when observation goals have been met and patient is ready for discharge.

## 2024-12-14 NOTE — PROGRESS NOTES
VSS. A/O forgetful & Nikolai. Up1/walker.  Denies pain. Asymptomatic +Covid.  Tolerating diet. Gen bruises on arms. Coccyx mapilex. Family @ bedside. Tele AFib CVR, Possible d'c TCU. K & mag replaced, K recheck WDL.  mag am recheck. UA sample needed.

## 2024-12-14 NOTE — PLAN OF CARE
Goal Outcome Evaluation:      Plan of Care Reviewed With: patient    Overall Patient Progress: no changeOverall Patient Progress: no change     Top portion must ALWAYS be completed  PRIMARY Concern: syncopal episode  SAFETY RISK Concerns (fall risk, behaviors, etc.): fall      Aggression Tool Color: green  Isolation/Type: special precautions, Covid +  Tests/Procedures for NEXT shift: consults pending  Consults? (Pending/following, signed-off?) Cards, PT, SW/CC consults pending  Where is patient from? (Home, TCU, etc.):   Other Important info for NEXT shift: consults pending  Anticipated DC date & active delays: TBD  _____________________________________________________________________________  SUMMARY NOTE:              (either paste note here OR complete the info below- RN to choose one- delete info below if not used)  Orientation/Cognitive: A&O, forgetfu  Observation Goals (Met/ Not Met): not met  Mobility Level/Assist Equipment: A2 to BSC  Antibiotics & Plan (IV/po, length of tx left): na  Pain Management: tylenol given x1  Tele/VS/O2: VSS on RA  ABNL Lab/BG: potassium 3.3, recheck 3.4  Diet: mod carb  Bowel/Bladder: WNL, BSC for voiding  Skin Concerns: scattered bruising  Drains/Devices: PIV  Patient Stated Goal for Today:

## 2024-12-14 NOTE — CONSULTS
"Care Management Initial Consult    General Information  Assessment completed with: Gracie, Nancy  Type of CM/SW Visit: Initial Assessment  Primary Care Provider verified and updated as needed: Yes (Dr Hernandez)   Readmission within the last 30 days: no previous admission in last 30 days      Reason for Consult: discharge planning  Advance Care Planning:    no ACP documents on file in [facility]     Communication Assessment  Patient's communication style: spoken language (English or Bilingual)    Hearing Difficulty or Deaf: yes   Wear Glasses or Blind: no    Cognitive  Cognitive/Neuro/Behavioral: WDL                      Living Environment:   People in home: spouse  Levi \"Sukhdeep\"  Current living Arrangements: house      Able to return to prior arrangements: yes  Living Arrangement Comments: 1 step in from the garage to a landing and then split entry with 12 steps (6+6) to get to main living level    Family/Social Support:  Care provided by: self, homecare agency  Provides care for: no one, unable/limited ability to care for self  Marital Status:   Support system:   Sukhdeep       Description of Support System: Supportive, Involved    Support Assessment: Adequate family and caregiver support    Current Resources:   Patient receiving home care services: No  Community Resources: Other (see comment) ([employer]  private duty PCA on Tues & Thurs from 1pm to 4pm paid for via their LTC insurance)  Equipment currently used at home: none  Supplies currently used at home: None    Employment/Financial:  Employment Status: retired     Financial Concerns:     Referral to Financial Worker: No  Finance Comments: active insurance: MEDICARE/MEDICARE and // FOR FOR LIFE  Does the patient's insurance plan have a 3 day qualifying hospital stay waiver?  No    Lifestyle & Psychosocial Needs:  Social Drivers of Health     Food Insecurity: Low Risk  (12/13/2024)    Food Insecurity     Within the past 12 months, " "did you worry that your food would run out before you got money to buy more?: No     Within the past 12 months, did the food you bought just not last and you didn t have money to get more?: No   Depression: Not at risk (9/25/2024)    [facility] from [facility]    PHQ-2     PHQ-2 TOTAL SCORE: 0   Housing Stability: Low Risk  (12/13/2024)    Housing Stability     Do you have housing? : Yes     Are you worried about losing your housing?: No   Tobacco Use: Medium Risk (9/25/2024)    Received from [facility]    Patient History     Smoking Tobacco Use: Former     Smokeless Tobacco Use: Never     Passive Exposure: Not on file   Financial Resource Strain: Low Risk  (12/13/2024)    Financial Resource Strain     Within the past 12 months, have you or your family members you live with been unable to get utilities (heat, electricity) when it was really needed?: No   Alcohol Use: Not on file   Transportation Needs: Low Risk  (12/13/2024)    Transportation Needs     Within the past 12 months, has lack of transportation kept you from medical appointments, getting your medicines, non-medical meetings or appointments, work, or from getting things that you need?: No   Physical Activity: Not on file   Interpersonal Safety: Not on file   Stress: Not on file   Social Connections: Unknown (12/22/2021)    Received from [facility]    Social Connections     Frequency of Communication with Friends and Family: Not on file   Health Literacy: Not on file     Functional Status:  Prior to admission patient needed assistance: ind with mobility  Assesssment of Functional Status:  (see therapy assessment) 12/14/24:  PT DC Recommendation home with assist; home with home care physical therapy; Transitional Care Facility   PT Rationale for DC Rec Patient is mobilizing with mostly CGA in room with WW though does leave it at times. Reports she \"doesn't " "need it for home.\" DIscussed that after falls and with Covid she may be weaker. Pt. adamant that she will not go to TCU. If  can give CGA level of support, feel patient can d/c to home with home PT to work on strengthening. If  cannot give this support or if pt. will be alone during the day a TCU should be considered.   PT Overview of current status CGA with WW; trial without and still CGA but more guarded gait; recommend use WW. Goals of therapy will be to address safe mobility and make recs for d/c to next level of care. Pt and RN will continue to follow all falls risk precautions as documented by RN staff while hospitalized   Equipment Needs at Home --  may need WW or SEC (pending trial)     Mental Health Status:  Mental Health Status: No Current Concerns (known memory issues)       Chemical Dependency Status:  Chemical Dependency Status: No Current Concerns           Values/Beliefs:  Spiritual, Cultural Beliefs, Jew Practices, Values that affect care: no          Values/Beliefs Comment: Manjeet    Discussed  Partnership in Safe Discharge Planning  document with patient/family: No    Additional Information:  Recent history: per chart review in the past week pt had 12/11 mechanical fall getting out of car (ED visit), a syncope and fall 12/12 (EMS called), and then \"2-3 episodes of unwitnessed falls overnight\" resulting in 12/13/24 ER visit and admission to observation unit.    Met with patient's daughters outside room due to COVID status, introduced self and role in discharge planning. Confirmed the information in the above assessment, please see each section for helpful details.      Patient and spouse live independently in their home in [place]--the \"fewest\" number of stairs when they enter via the garage into the [facility] [place] level home is 12 steps (6+6).  They have a long term care insurance that is active, which they are using for a \"[employer] \" agency caregiver on Tuesdays and " Thursdays from 1pm-4pm.      Reviewed therapy recommendations. Family in strong agreement that pt does need TCU level of care before safe to return; spouse unable to provide the assist needed to keep pt safe.  Reviewed [facility]/Outpatient Observation brochure.  Family OK with private pay cost if needed and was Pt/family was provided with the Medicare Compare list for SNF.  Discussed associated Medicare star ratings to assist with choice for referrals/discharge planning Yes; Education was given to pt/family that star ratings are updated/maintained by Medicare and can be reviewed by visiting www.medicare.gov Yes.  They will star top 3 choices and Crow an additional 7.      CM-RN updated CM-SW.      Next Steps: CM-SW to stop by with full TCU list, and collect choices thus far.      ADDENDUM: choices received & referrals sent   #1 [facility]  #2 [facility]  #3 [facility]  #4 [facility]  #5 Usman [facility]aview Conner Novoa RN, BSN, PHN  [facility]  Inpatient Care Management - FLOAT  Please reach out via vocera or contact   Short Stay MIKE MORALES Mobile: [phone] daily 7:30-4:00

## 2024-12-15 ENCOUNTER — APPOINTMENT (OUTPATIENT)
Dept: PHYSICAL THERAPY | Facility: CLINIC | Age: 84
End: 2024-12-15
Payer: MEDICARE

## 2024-12-15 ENCOUNTER — ORDERS ONLY (AUTO-RELEASED) (OUTPATIENT)
Dept: MEDSURG UNIT | Facility: CLINIC | Age: 84
End: 2024-12-15
Payer: MEDICARE

## 2024-12-15 DIAGNOSIS — I48.4 ATYPICAL ATRIAL FLUTTER (H): ICD-10-CM

## 2024-12-15 DIAGNOSIS — I48.19 PERSISTENT ATRIAL FIBRILLATION (H): ICD-10-CM

## 2024-12-15 LAB
ANION GAP SERPL CALCULATED.3IONS-SCNC: 8 MMOL/L (ref 7–15)
BUN SERPL-MCNC: 16 MG/DL (ref 8–23)
CALCIUM SERPL-MCNC: 8.3 MG/DL (ref 8.8–10.4)
CHLORIDE SERPL-SCNC: 104 MMOL/L (ref 98–107)
CREAT SERPL-MCNC: 0.47 MG/DL (ref 0.51–0.95)
EGFRCR SERPLBLD CKD-EPI 2021: >90 ML/MIN/1.73M2
ERYTHROCYTE [DISTWIDTH] IN BLOOD BY AUTOMATED COUNT: 12.7 % (ref 10–15)
GLUCOSE BLDC GLUCOMTR-MCNC: 101 MG/DL (ref 70–99)
GLUCOSE SERPL-MCNC: 91 MG/DL (ref 70–99)
HCO3 SERPL-SCNC: 26 MMOL/L (ref 22–29)
HCT VFR BLD AUTO: 36.6 % (ref 35–47)
HGB BLD-MCNC: 12.1 G/DL (ref 11.7–15.7)
MAGNESIUM SERPL-MCNC: 1.8 MG/DL (ref 1.7–2.3)
MCH RBC QN AUTO: 30.7 PG (ref 26.5–33)
MCHC RBC AUTO-ENTMCNC: 33.1 G/DL (ref 31.5–36.5)
MCV RBC AUTO: 93 FL (ref 78–100)
PLATELET # BLD AUTO: 132 10E3/UL (ref 150–450)
POTASSIUM SERPL-SCNC: 3.5 MMOL/L (ref 3.4–5.3)
RBC # BLD AUTO: 3.94 10E6/UL (ref 3.8–5.2)
SODIUM SERPL-SCNC: 138 MMOL/L (ref 135–145)
WBC # BLD AUTO: 3.8 10E3/UL (ref 4–11)

## 2024-12-15 PROCEDURE — 36415 COLL VENOUS BLD VENIPUNCTURE: CPT | Performed by: HOSPITALIST

## 2024-12-15 PROCEDURE — 82374 ASSAY BLOOD CARBON DIOXIDE: CPT | Performed by: HOSPITALIST

## 2024-12-15 PROCEDURE — 97116 GAIT TRAINING THERAPY: CPT | Mod: GP,CQ

## 2024-12-15 PROCEDURE — 85027 COMPLETE CBC AUTOMATED: CPT | Performed by: HOSPITALIST

## 2024-12-15 PROCEDURE — 250N000013 HC RX MED GY IP 250 OP 250 PS 637: Performed by: PHYSICIAN ASSISTANT

## 2024-12-15 PROCEDURE — 80048 BASIC METABOLIC PNL TOTAL CA: CPT | Performed by: HOSPITALIST

## 2024-12-15 PROCEDURE — G0378 HOSPITAL OBSERVATION PER HR: HCPCS

## 2024-12-15 PROCEDURE — 99232 SBSQ HOSP IP/OBS MODERATE 35: CPT | Performed by: HOSPITALIST

## 2024-12-15 PROCEDURE — 82962 GLUCOSE BLOOD TEST: CPT

## 2024-12-15 PROCEDURE — 250N000013 HC RX MED GY IP 250 OP 250 PS 637: Performed by: INTERNAL MEDICINE

## 2024-12-15 PROCEDURE — 83735 ASSAY OF MAGNESIUM: CPT | Performed by: HOSPITALIST

## 2024-12-15 RX ORDER — POTASSIUM CHLORIDE 750 MG/1
10 TABLET, EXTENDED RELEASE ORAL ONCE
Status: COMPLETED | OUTPATIENT
Start: 2024-12-15 | End: 2024-12-15

## 2024-12-15 RX ORDER — MAGNESIUM OXIDE 400 MG/1
400 TABLET ORAL EVERY 4 HOURS
Status: COMPLETED | OUTPATIENT
Start: 2024-12-15 | End: 2024-12-15

## 2024-12-15 RX ADMIN — ACETAMINOPHEN 650 MG: 325 TABLET, FILM COATED ORAL at 00:57

## 2024-12-15 RX ADMIN — Medication 400 MG: at 20:44

## 2024-12-15 RX ADMIN — MEMANTINE 10 MG: 10 TABLET ORAL at 20:44

## 2024-12-15 RX ADMIN — DONEPEZIL HYDROCHLORIDE 5 MG: 5 TABLET, FILM COATED ORAL at 22:03

## 2024-12-15 RX ADMIN — ACETAMINOPHEN 650 MG: 325 TABLET, FILM COATED ORAL at 22:03

## 2024-12-15 RX ADMIN — Medication 400 MG: at 23:59

## 2024-12-15 RX ADMIN — MEMANTINE 10 MG: 10 TABLET ORAL at 09:47

## 2024-12-15 RX ADMIN — POTASSIUM CHLORIDE 10 MEQ: 750 TABLET, EXTENDED RELEASE ORAL at 20:44

## 2024-12-15 ASSESSMENT — ACTIVITIES OF DAILY LIVING (ADL)
ADLS_ACUITY_SCORE: 52
ADLS_ACUITY_SCORE: 49
ADLS_ACUITY_SCORE: 52
ADLS_ACUITY_SCORE: 49
ADLS_ACUITY_SCORE: 52
ADLS_ACUITY_SCORE: 49
ADLS_ACUITY_SCORE: 52
ADLS_ACUITY_SCORE: 49
ADLS_ACUITY_SCORE: 52
ADLS_ACUITY_SCORE: 49
ADLS_ACUITY_SCORE: 52
ADLS_ACUITY_SCORE: 49
ADLS_ACUITY_SCORE: 49
ADLS_ACUITY_SCORE: 52

## 2024-12-15 NOTE — PROGRESS NOTES
[facility]    Medicine Progress Note - Hospitalist Service    Date of Admission:  12/13/2024    Assessment & Plan   Carmen Zhang is a 84 year old female with PMHx including cognitive impairment, hypertension, and hyperlipidemia who presented to the ED for the second time this week subsequent to a syncopal event. She was found to have positive orthostatic BPs, positive for Covid, and noted to be in new a fib/flutter with CVR. Registered under observation status for further evaluation and treatment.     Initial syncopal episode described as a mechanical fall while getting out of a car at which time she hit her head. She presented to the ED 12/11. CT head negative. Scalp lac repaired. Pt had recurrent, unwitnessed syncope 12/12 at which time EMS were called and pt reportedly had positive orthostatics, but was not brought to the ED. Subsequent to that, she had has 2-3 episodes of unwitnessed falls overnight while going to the bathroom. No CP, palpitations, prodromal dizziness. Pt does take ASA 81 mg po every day.     Syncope and collapse, multifactorial  Orthostatic hypotension   Generalized weakness  Posterior scalp lac s/p repair (12/11/2024)  *Likely multifactorial in the setting of orthostatic hypotension, new a fib/flutter with CVR, covid infection.   *CMP with K 3.3, otherwise largely unremarkable. CBC with mild thrombocytopenia. Covid positive. CT head negative. EKG with a fib with CVR.  *Echo 4/2023 with EF 60-65%, no RWMA, grade II or moderate DD, no significant valve disease.   *S/P 1 L IVF bolus   - Registered to observation.   - Telemetry -remains in A-fib but rate controlled.  - Received IV fluids, now discontinued.  Encourage oral intake.  - Recheck orthostatic vitals today.  - PT consulted, recommending home with assist versus TCU.  Patient's  is now sick as well and would not be able to assist at home.  Recommend TCU.  - Care transitions consulted regarding discharge  planning, appreciate their assistance.  - Anticipate she will be ready for discharge when TCU placement is found.    Atrial fibrillation/flutter with CVR, new diagnosis  *[id number]-ILWo score of at least 4.  - Cardiology consulted, appreciate their assistance.  - Recommend against anticoagulation at this time due to syncope and falls.  - Rate OK without intervention, continue to monitor.  - Telemetry.  - Transthoracic echocardiogram on 12/14/2024 showed normal LVEF, no significant valve disease.  - Plan for Zio patch after discharge and outpatient follow up with cardiology in 4 weeks.    Covid infection  Vaccinated X2, but no recent boosters. Sx of weakness but no significant respiratory symptoms.  - Offered Paxlovid, but patient/spouse declined.  - Not hypoxic, no role for steroids at this time.  - Encourage ambulation.    Hypokalemia  - Replace and recheck per RN managed protocol.  - Magnesium within normal limits.    Thrombocytopenia  Platelets 123 in the ED, likely reactive in the setting of viral infection.   - Platelets stable at 132 on 12/15, monitor intermittently.    Hypertension  - PTA hydrochlorothiazide discontinued in setting of orthostatic hypotension and hypokalemia.  - Monitor blood pressure with routine vitals, will consider alternative blood pressure agent if needed going forward.    Hyperlipidemia  - Hold PTA atorvastatin while in observation status.    Cognitive impairment  Follows with Dr. Fish, last visit 8/19/24.   - Continue PTA Donepezil, Namenda, Seroquel  - Delirium prevention protocol in place.   - Plan of care discussed with her family on 12/14/2024.    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  - Noted.       Observation Goals: List all  goals to be met before discharge:, - Diagnostic tests and consults  completed and resulted, - No further episodes of syncope and any new arrhythmia addressed with controlled heart rates, - Vital signs normal or at patient baseline and orthostatic vitals are normal and patient not lightheaded with standing, - Tolerating oral intake to maintain hydration, - Safe disposition plan has been identified, - Nurse to notify provider when observation goals have been met and patient is ready for discharge.  Diet: Moderate Consistent Carb (60 g CHO per Meal) Diet    DVT Prophylaxis: Pneumatic Compression Devices and Ambulate every shift  Hoff Catheter: Not present  Lines: None     Cardiac Monitoring: ACTIVE order. Indication: Tachyarrhythmias, acute (48 hours)  Code Status: Full Code      Clinically Significant Risk Factors Present on Admission        # Hypokalemia: Lowest K = 3.3 mmol/L in last 2 days, will replace as needed    # Hypocalcemia: Lowest Ca = 8.2 mg/dL in last 2 days, will monitor and replace as appropriate     # Hypoalbuminemia: Lowest albumin = 3.4 g/dL at 12/13/2024 12:24 PM, will monitor as appropriate   # Drug Induced Platelet Defect: home medication list includes an antiplatelet medication   # Hypertension: Noted on problem list     # Dementia: noted on problem list                  Social Drivers of Health    Tobacco Use: Medium Risk (9/25/2024)    Received from [facility]    Patient History     Smoking Tobacco Use: Former     Smokeless Tobacco Use: Never    Received from [facility]    Social Connections          Disposition Plan     Medically Ready for Discharge: Anticipated Today pending safe discharge plan             Ronal Wall MD  Hospitalist Service  [facility]  Securely message with [email or web address] (more info)  Text page via [employer] Paging/Directory   ______________________________________________________________________    Interval History   Carmen Zhang was seen this  morning.  She feels okay.  Some discomfort at the site of the laceration on the back of her head.  No other complaints/concerns.  Denies fevers, chest pain, shortness of breath, nausea, abdominal pain.  Discussed discharge plan.  Plan of care discussed with bedside nurse.    Physical Exam   Vital Signs: Temp: 97.4  F (36.3  C) Temp src: Oral BP: (!) 150/71 Pulse: 70   Resp: 16 SpO2: 96 % O2 Device: None (Room air)    Weight: 0 lbs 0 oz    Constitutional: awake, alert, cooperative, no apparent distress, laying in the hospital bed with the head of the bed elevated  Respiratory: no increased work of breathing, clear to auscultation bilaterally, no crackles or wheezing  Cardiovascular: irregular rhythm, normal rate, no murmur noted  GI: normal bowel sounds, soft, non-distended, non-tender  Skin: warm, dry, posterior scalp laceration with staples in place  Musculoskeletal: no lower extremity pitting edema present  Neurologic: awake, alert, answers questions appropriately, moves all extremities    Medical Decision Making       40 MINUTES SPENT BY ME on the date of service doing chart review, history, exam, documentation & further activities per the note.      Data     I have personally reviewed the following data over the past 24 hrs:    3.8 (L)  \   12.1   / 132 (L)     138 104 16.0 /  91   3.5 26 0.47 (L) \

## 2024-12-15 NOTE — PROGRESS NOTES
Observation goals  PRIOR TO DISCHARGE        Comments: List all  goals to be met before discharge:  - Diagnostic tests and consults completed and resulted: met  - No further episodes of syncope and any new arrhythmia addressed with controlled heart rates: not met  - Vital signs normal or at patient baseline and orthostatic vitals are normal and patient not lightheaded with standing: not met, positive orthostatic BP  - Tolerating oral intake to maintain hydration: MET  - Safe disposition plan has been identified: Not met  - Nurse to notify provider when observation goals have been met and patient is ready for discharge.

## 2024-12-15 NOTE — PROGRESS NOTES
Observation goals  PRIOR TO DISCHARGE        Comments: List all  goals to be met before discharge:  - Diagnostic tests and consults completed and resulted Met  - No further episodes of syncope and any new arrhythmia addressed with controlled heart rates Met  - Vital signs normal or at patient baseline and orthostatic vitals are normal and patient not lightheaded with standing In Progress  - Tolerating oral intake to maintain hydration Met  - Safe disposition plan has been identified Not Met  - Nurse to notify provider when observation goals have been met and patient is ready for discharge.

## 2024-12-15 NOTE — PROVIDER NOTIFICATION
MD Notification    Notified Person: MD    Notified Person Name: Sameer    Notification Date/Time: 9:38pm    Notification Interaction: Vocera    Purpose of Notification: Patient lost IV access, she does not have any IV medications scheduled-do we still need one tonight?    Orders Received: No PIV is okay    Comments:

## 2024-12-15 NOTE — PROGRESS NOTES
PRIMARY Concern: Syncopal episode  SAFETY RISK Concerns (fall risk, behaviors, etc.): fall risk     Aggression Tool Color: green  Isolation/Type: special precautions, Covid +  Tests/Procedures for NEXT shift: Labs  Consults? (Pending/following, signed-off?) Cards, PT, SW/CC following  Where is patient from? (Home, TCU, etc.):   Other Important info for NEXT shift:   Anticipated DC date & active delays: TBD pending TCU placement  _____________________________________________________________________________  SUMMARY NOTE:            Orientation/Cognitive: A&O, forgetful  Observation Goals (Met/ Not Met): not met  Mobility Level/Assist Equipment: A1 gb/w  Antibiotics & Plan (IV/po, length of tx left): na  Pain Management: Denies  Tele/VS/O2: VSS on RA, orthostatics + on admission, tele= controlled A fib  ABNL Lab/BG: K+=4.1  Diet: mod carb  Bowel/Bladder: Can be inct of urine at times, cont of bowel  Skin Concerns: Scattered bruising, laceration on back of scalp- 2 staples in place, serosanguineous drainage   Drains/Devices: No PIV-MD okayed  Patient Stated Goal for Today: To rest

## 2024-12-15 NOTE — PROGRESS NOTES
Observation goals           - Diagnostic tests and consults completed and resulted- Not met, SW following   - No further episodes of syncope and any new arrhythmia addressed with controlled heart rates- Partially Met   - Vital signs normal or at patient baseline and orthostatic vitals are normal and patient not lightheaded with standing- Not met, + orthos  - Tolerating oral intake to maintain hydration- Met  - Safe disposition plan has been identified- Partially met, TCU placement  - Nurse to notify provider when observation goals have been met and patient is ready for discharge.

## 2024-12-15 NOTE — PROGRESS NOTES
Care Management Follow Up    Length of Stay (days): 0    Expected Discharge Date: 12/16/2024     Concerns to be Addressed: discharge planning     Patient plan of care discussed at interdisciplinary rounds: Yes    Anticipated Discharge Disposition: Transitional Care     Anticipated Discharge Services: None  Anticipated Discharge DME: [facility]    Patient/family educated on Medicare website which has current facility and service quality ratings: yes (medicare.gov/care-compare)  Education Provided on the Discharge Plan: Yes  Patient/Family in Agreement with the Plan: yes    Referrals Placed by CM/SW:  (TCU referrals)  Private pay costs discussed: Not applicable    Discussed  Partnership in Safe Discharge Planning  document with patient/family: No     Handoff Completed: No, handoff not indicated or clinically appropriate    Additional Information:  TCU referrals still pending. [facility] is considering, pending bed availability, but has not officially accepted yet.     LUIS ALBERTO Romero  Social Work  [facility]

## 2024-12-15 NOTE — CONSULTS
Consult completed on 12/14/24. Care management following.    LUIS ALBERTO Romero  Social Work  [facility]

## 2024-12-15 NOTE — PLAN OF CARE
PRIMARY Concern: Syncopal episode  SAFETY RISK Concerns (fall risk, behaviors, etc.): fall risk      Aggression Tool Color: green  Isolation/Type: Special precautions  Tests/Procedures for NEXT shift: n/a  Consults? (Pending/following, signed-off?) Cards signed off, PT, SW/CC following for dispo  Where is patient from? (Home, TCU, etc.): home ([facility] per pt)  Other Important info for NEXT shift:   Anticipated DC date & active delays: TBD pending TCU placement  _____________________________________________________________________________  SUMMARY NOTE:  Orientation/Cognitive: A&Ox4, forgetful  Observation Goals (Met/ Not Met): Not Met  Mobility Level/Assist Equipment: Ax1, gb, walker  Antibiotics & Plan (IV/po, length of tx left): n/a  Pain Management: denies pain  Tele/VS/O2: tele is afib CVR, VSS on RA  ABNL Lab/BG: WBC 3.8  Diet: tolerating a mod carb diet  Bowel/Bladder: Continent of bowel, can be incontinent of urine at times  Skin Concerns: Scattered bruising, laceration to back of scalp-2 staples in place  Drains/Devices: no PIV  Patient Stated Goal for Today: get rest

## 2024-12-15 NOTE — PLAN OF CARE
Goal Outcome Evaluation:  PRIMARY Concern: Syncopal episode  SAFETY RISK Concerns (fall risk, behaviors, etc.): fall risk     Aggression Tool Color: green  Isolation/Type: special precautions, Covid +  Tests/Procedures for NEXT shift: Labs  Consults? (Pending/following, signed-off?) Cards, PT, SW/CC following  Where is patient from? (Home, TCU, etc.):   Other Important info for NEXT shift:   Anticipated DC date & active delays: TBD pending TCU placement  _____________________________________________________________________________  SUMMARY NOTE:            Orientation/Cognitive: A&O, forgetful  Observation Goals (Met/ Not Met): not met  Mobility Level/Assist Equipment: A1 gb/w  Antibiotics & Plan (IV/po, length of tx left): na  Pain Management: c/o headache and had tylenol with relief  Tele/VS/O2: VSS on RA, orthostatics + on admission, tele= controlled A fib  ABNL Lab/BG: K+=4.1  Diet: mod carb  Bowel/Bladder: Incontinent of urine  at times, cont of bowel  Skin Concerns: Scattered bruising, laceration on back of scalp- 2 staples in place, dried drainage   Drains/Devices: No PIV-MD okayed  Patient Stated Goal for Today: To rest    Observation goals  PRIOR TO DISCHARGE        Comments: List all  goals to be met before discharge:  - Diagnostic tests and consults completed and resulted: met  - No further episodes of syncope and any new arrhythmia addressed with controlled heart rates: not met  - Vital signs normal or at patient baseline and orthostatic vitals are normal and patient not lightheaded with standing: not met, positive orthostatic BP  - Tolerating oral intake to maintain hydration: MET  - Safe disposition plan has been identified: Not met  - Nurse to notify provider when observation goals have been met and patient is ready for discharge.

## 2024-12-16 ENCOUNTER — APPOINTMENT (OUTPATIENT)
Dept: PHYSICAL THERAPY | Facility: CLINIC | Age: 84
End: 2024-12-16
Payer: MEDICARE

## 2024-12-16 LAB
HOLD SPECIMEN: NORMAL
MAGNESIUM SERPL-MCNC: 2 MG/DL (ref 1.7–2.3)
POTASSIUM SERPL-SCNC: 3.5 MMOL/L (ref 3.4–5.3)

## 2024-12-16 PROCEDURE — 250N000013 HC RX MED GY IP 250 OP 250 PS 637: Performed by: PHYSICIAN ASSISTANT

## 2024-12-16 PROCEDURE — 250N000013 HC RX MED GY IP 250 OP 250 PS 637: Performed by: HOSPITALIST

## 2024-12-16 PROCEDURE — 36415 COLL VENOUS BLD VENIPUNCTURE: CPT | Performed by: INTERNAL MEDICINE

## 2024-12-16 PROCEDURE — G0378 HOSPITAL OBSERVATION PER HR: HCPCS

## 2024-12-16 PROCEDURE — 83735 ASSAY OF MAGNESIUM: CPT | Performed by: INTERNAL MEDICINE

## 2024-12-16 PROCEDURE — 84132 ASSAY OF SERUM POTASSIUM: CPT | Performed by: INTERNAL MEDICINE

## 2024-12-16 PROCEDURE — 97530 THERAPEUTIC ACTIVITIES: CPT | Mod: GP

## 2024-12-16 PROCEDURE — 258N000003 HC RX IP 258 OP 636: Performed by: HOSPITALIST

## 2024-12-16 PROCEDURE — 99232 SBSQ HOSP IP/OBS MODERATE 35: CPT | Performed by: HOSPITALIST

## 2024-12-16 PROCEDURE — 86140 C-REACTIVE PROTEIN: CPT

## 2024-12-16 PROCEDURE — 97116 GAIT TRAINING THERAPY: CPT | Mod: GP

## 2024-12-16 PROCEDURE — 250N000013 HC RX MED GY IP 250 OP 250 PS 637: Performed by: INTERNAL MEDICINE

## 2024-12-16 RX ORDER — POTASSIUM CHLORIDE 750 MG/1
10 TABLET, EXTENDED RELEASE ORAL ONCE
Status: COMPLETED | OUTPATIENT
Start: 2024-12-16 | End: 2024-12-16

## 2024-12-16 RX ORDER — MAGNESIUM OXIDE 400 MG/1
400 TABLET ORAL EVERY 4 HOURS
Status: COMPLETED | OUTPATIENT
Start: 2024-12-16 | End: 2024-12-16

## 2024-12-16 RX ADMIN — Medication 400 MG: at 14:28

## 2024-12-16 RX ADMIN — Medication 5 MG: at 22:13

## 2024-12-16 RX ADMIN — Medication 5 MG: at 00:03

## 2024-12-16 RX ADMIN — MEMANTINE 10 MG: 10 TABLET ORAL at 19:53

## 2024-12-16 RX ADMIN — SODIUM CHLORIDE 500 ML: 9 INJECTION, SOLUTION INTRAVENOUS at 14:45

## 2024-12-16 RX ADMIN — Medication 400 MG: at 09:39

## 2024-12-16 RX ADMIN — MEMANTINE 10 MG: 10 TABLET ORAL at 09:39

## 2024-12-16 RX ADMIN — POTASSIUM CHLORIDE 10 MEQ: 750 TABLET, EXTENDED RELEASE ORAL at 09:39

## 2024-12-16 RX ADMIN — DONEPEZIL HYDROCHLORIDE 5 MG: 5 TABLET, FILM COATED ORAL at 19:55

## 2024-12-16 ASSESSMENT — ACTIVITIES OF DAILY LIVING (ADL)
ADLS_ACUITY_SCORE: 49

## 2024-12-16 NOTE — PROGRESS NOTES
Observation goals  PRIOR TO DISCHARGE        Comments: List all  goals to be met before discharge:  - Diagnostic tests and consults completed and resulted Met  - No further episodes of syncope and any new arrhythmia addressed with controlled heart rates Met  - Vital signs normal or at patient baseline and orthostatic vitals are normal and patient not lightheaded with standing: Met  - Tolerating oral intake to maintain hydration Met  - Safe disposition plan has been identified Not Met  - Nurse to notify provider when observation goals have been met and patient is ready for discharge.

## 2024-12-16 NOTE — PROGRESS NOTES
[facility]    Medicine Progress Note - Hospitalist Service    Date of Admission:  12/13/2024    Assessment & Plan   Carmen Zhang is a 84 year old female with PMHx including cognitive impairment, hypertension, and hyperlipidemia who presented to the ED for the second time this week subsequent to a syncopal event. She was found to have positive orthostatic BPs, positive for Covid, and noted to be in new a fib/flutter with CVR. Registered under observation status for further evaluation and treatment.     Initial syncopal episode described as a mechanical fall while getting out of a car at which time she hit her head. She presented to the ED 12/11. CT head negative. Scalp lac repaired. Pt had recurrent, unwitnessed syncope 12/12 at which time EMS were called and pt reportedly had positive orthostatics, but was not brought to the ED. Subsequent to that, she had has 2-3 episodes of unwitnessed falls overnight while going to the bathroom. No CP, palpitations, prodromal dizziness. Pt does take ASA 81 mg po every day.     Syncope and collapse, multifactorial  Orthostatic hypotension   Generalized weakness  Posterior scalp lac s/p repair (12/11/2024)  *Likely multifactorial in the setting of orthostatic hypotension, new a fib/flutter with CVR, covid infection.   *CMP with K 3.3, otherwise largely unremarkable. CBC with mild thrombocytopenia. Covid positive. CT head negative. EKG with a fib with CVR.  *Echo 4/2023 with EF 60-65%, no RWMA, grade II or moderate DD, no significant valve disease.   *S/P 1 L IVF bolus   - Registered to observation.   - Telemetry -remains in A-fib but rate controlled.  - Received IV fluids, now discontinued.  Encourage oral intake.  - Orthostatic vitals still positive on 12/15/2024, will recheck today and consider IV fluids if still positive.  - PT consulted, recommending home with assist versus TCU.  Patient's  is now sick as well and would not be able to  assist at home.  Recommend TCU.  - Care transitions consulted regarding discharge planning, appreciate their assistance.  - Anticipate she will be ready for discharge when TCU placement is found.  - Posterior scalp staples can be removed on 12/20/2024 if wound is healing well.    Atrial fibrillation/flutter with CVR, new diagnosis  *HRO2PL0-WFLo score of at least 4.  - Cardiology consulted, appreciate their assistance.  - Recommend against anticoagulation at this time due to syncope and falls.  - Rate OK without intervention, continue to monitor.  - Telemetry.  - Transthoracic echocardiogram on 12/14/2024 showed normal LVEF, no significant valve disease.  - Plan for Zio patch after discharge and outpatient follow up with cardiology in 4 weeks.    Covid infection  Vaccinated X2, but no recent boosters. Sx of weakness but no significant respiratory symptoms.  - Offered Paxlovid, but patient/spouse declined.  - Not hypoxic, no role for steroids at this time.  - Encourage ambulation.    Hypokalemia  - Replace and recheck per RN managed protocol.  - Magnesium within normal limits.    Thrombocytopenia  Platelets 123 in the ED, likely reactive in the setting of viral infection.   - Platelets stable at 132 on 12/15, monitor intermittently.    Hypertension  - PTA hydrochlorothiazide discontinued in setting of orthostatic hypotension and hypokalemia.  - Monitor blood pressure with routine vitals, will consider alternative blood pressure agent if needed going forward.    Hyperlipidemia  - Hold PTA atorvastatin while in observation status.    Cognitive impairment  Follows with Dr. Fish, last visit 8/19/24.   - Continue PTA Donepezil, Namenda, Seroquel  - Delirium prevention protocol in place.   - Plan of care discussed with her family on 12/14/2024.    Hx of thoracic mass s/p resection  Per prior documentation, history of possible lung cancer in her 30s, s/p thoracotomy was found to have a fist-sized mass attached to her  heart which was resected and turned out benign.  - Noted.       Observation Goals: List all  goals to be met before discharge:, - Diagnostic tests and consults completed and resulted, - No further episodes of syncope and any new arrhythmia addressed with controlled heart rates, - Vital signs normal or at patient baseline and orthostatic vitals are normal and patient not lightheaded with standing, - Tolerating oral intake to maintain hydration, - Safe disposition plan has been identified, - Nurse to notify provider when observation goals have been met and patient is ready for discharge.  Diet: Moderate Consistent Carb (60 g CHO per Meal) Diet    DVT Prophylaxis: Pneumatic Compression Devices and Ambulate every shift  Hoff Catheter: Not present  Lines: None     Cardiac Monitoring: ACTIVE order. Indication: Tachyarrhythmias, acute (48 hours)  Code Status: Full Code      Clinically Significant Risk Factors Present on Admission               # Hypoalbuminemia: Lowest albumin = 3.4 g/dL at 12/13/2024 12:24 PM, will monitor as appropriate   # Drug Induced Platelet Defect: home medication list includes an antiplatelet medication   # Hypertension: Noted on problem list     # Dementia: noted on problem list                  Social Drivers of Health    Tobacco Use: Medium Risk (9/25/2024)    Received from [facility]    Patient History     Smoking Tobacco Use: Former     Smokeless Tobacco Use: Never    Received from [facility]    Social Connections          Disposition Plan     Medically Ready for Discharge: Ready Now, awaiting TCU placement             Ronal Wall MD  Hospitalist Service  [facility]  Securely message with [employer] (more info)  Text page via [employer] Paging/Directory   ______________________________________________________________________    Interval History   Carmen Zhang was seen this morning.  She feels  okay.  Complains of discomfort in the back of her head, similar to the past few days.  Occasional cough.  Denies fevers, chest pain, shortness of breath, nausea, abdominal pain.  Appetite is pretty good.  Discussed discharge plan, awaiting TCU placement.  Updated her daughter by phone this morning.    Physical Exam   Vital Signs: Temp: 97  F (36.1  C) Temp src: Oral BP: (!) 153/91 Pulse: 75   Resp: 16 SpO2: 95 % O2 Device: None (Room air)    Weight: 0 lbs 0 oz    Constitutional: awake, alert, cooperative, no apparent distress, sitting up in a chair  Respiratory: no increased work of breathing, clear to auscultation bilaterally, no crackles or wheezing  Cardiovascular: irregular rhythm, normal rate, no murmur noted  GI: normal bowel sounds, soft, non-distended, non-tender  Skin: warm, dry, posterior scalp laceration with staples in place  Musculoskeletal: no lower extremity pitting edema present  Neurologic: awake, alert, answers questions appropriately, moves all extremities    Medical Decision Making       40 MINUTES SPENT BY ME on the date of service doing chart review, history, exam, documentation & further activities per the note.      Data     I have personally reviewed the following data over the past 24 hrs:    N/A  \   N/A   / N/A     N/A N/A N/A /  N/A   3.5 N/A N/A \

## 2024-12-16 NOTE — UTILIZATION REVIEW
Concurrent stay review; Secondary Review Determination - [facility]        Under the authority of the Utilization Management Committee, the utilization review process indicated a secondary review on the above patient.  The review outcome is based on review of the medical records, discussions with staff, and applying clinical experience noted on the date of the review.        (x) Outpatient senior care status is appropriate       RATIONALE FOR DETERMINATION:     Carmen Zhang is a 84 year old female with PMHx including cognitive impairment, hypertension, and hyperlipidemia who was admitted on 12/13/2024 after a syncopal event. She was found to have positive orthostatic BPs, positive for Covid, and noted to be in new a fib/flutter with CVR. Registered under observation status for further evaluation and treatment. Medically stable for discharge at this time, awaiting TCU placement.    Patient delayed discharge is related to disposition, there is no medical necessity for inpatient admission at the time of this review. If there is a change in patient status, please resend for review.    The information on this document is developed by the utilization review team in order for the business office to ensure compliance.  This only denotes the appropriateness of proper admission status and does not reflect the quality of care rendered.       The definitions of Inpatient Status and Observation Status used in making the determination above are those provided in the CMS Coverage Manual, Chapter 1 and Chapter 6, section 70.4.       Sincerely,    Rosalba Knight MD  Physician Advisor  Utilization Management

## 2024-12-16 NOTE — PROGRESS NOTES
Care Management Follow Up    Length of Stay (days): 0    Expected Discharge Date: 12/17/2024     Concerns to be Addressed: discharge planning     Patient plan of care discussed at interdisciplinary rounds: Yes    Anticipated Discharge Disposition: Transitional Care     Anticipated Discharge Services: None  Anticipated Discharge DME: Kaleb    Patient/family educated on Medicare website which has current facility and service quality ratings: yes (medicare.gov/care-compare)  Education Provided on the Discharge Plan: Yes  Patient/Family in Agreement with the Plan: yes    Referrals Placed by CM/SW:  (TCU referrals)  Private pay costs discussed: Not applicable    Discussed  Partnership in Safe Discharge Planning  document with patient/family: No     Handoff Completed: No, handoff not indicated or clinically appropriate    Additional Information:  Checked on referrals. [facility] is considering, but they do not have an opening until 12/18 or 12/19.   [facility]CU called, they will review.   Referrals to [facility] still pending review.    Addendum 1500: per [facility] team, [facility] can accept. Left VM for [facility] in admissions (ph: [phone]). Inquired about their private pay deposit cost.   [facility] called back and informed they didn't see pt was covid positive, for this reason they couldn't accept until pt was off of 10 day isolation.     Denice Karimi, LUIS ALBERTO  Social Work  [facility]

## 2024-12-16 NOTE — PROVIDER NOTIFICATION
MD Notification    Notified Person: MD    Notified Person Name:  Camposmaamejoanne    Notification Date/Time: 12/16 @ 6471    Notification Interaction: vocera    Purpose of Notification: positive orthos from sit to stand 133/82 to 107/60. Do you want to order IV fluids? please advise thanks Cori MORALES [phone]    Orders Received:    Comments:

## 2024-12-16 NOTE — PLAN OF CARE
Goal Outcome Evaluation:  PRIMARY Concern: Syncopal episode  SAFETY RISK Concerns (fall risk, behaviors, etc.): fall risk     Aggression Tool Color: green  Isolation/Type: special precautions, Covid +  Tests/Procedures for NEXT shift: Labs  Consults? (Pending/following, signed-off?) Cards signed off, PT, SW/CC following  Where is patient from? (Home, TCU, etc.): Home  Other Important info for NEXT shift:   Anticipated DC date & active delays: TCU referrals still pending. [facility] is considering, pending bed availability, but has not officially accepted yet.    _____________________________________________________________________________  SUMMARY NOTE:            Orientation/Cognitive: A&O, forgetful  Observation Goals (Met/ Not Met): not met  Mobility Level/Assist Equipment: A1 gb/w  Antibiotics & Plan (IV/po, length of tx left): na  Pain Management: c/o head pain tylenol x1  Tele/VS/O2: VSS on RA except HTN. Tele: Afib  ABNL Lab/BG: Replaced K and mag, rechecks in the am  Diet: mod carb  Bowel/Bladder: Incontinent of urine  at times, cont of bowel  Skin Concerns: Scattered bruising, laceration on back of scalp- 2 staples in place, dried drainage   Drains/Devices: No PIV  Patient Stated Goal for Today: To rest        Observation goals  PRIOR TO DISCHARGE       Comments: List all  goals to be met before discharge:  - Diagnostic tests and consults completed and resulted Met  - No further episodes of syncope and any new arrhythmia addressed with controlled heart rates Met  - Vital signs normal or at patient baseline and orthostatic vitals are normal and patient not lightheaded with standing: Met  - Tolerating oral intake to maintain hydration Met  - Safe disposition plan has been identified Not Met  - Nurse to notify provider when observation goals have been met and patient is ready for discharge.

## 2024-12-17 ENCOUNTER — APPOINTMENT (OUTPATIENT)
Dept: PHYSICAL THERAPY | Facility: CLINIC | Age: 84
End: 2024-12-17
Payer: MEDICARE

## 2024-12-17 ENCOUNTER — APPOINTMENT (OUTPATIENT)
Dept: CT IMAGING | Facility: CLINIC | Age: 84
End: 2024-12-17
Payer: MEDICARE

## 2024-12-17 LAB
ANION GAP SERPL CALCULATED.3IONS-SCNC: 11 MMOL/L (ref 7–15)
BUN SERPL-MCNC: 13.2 MG/DL (ref 8–23)
CALCIUM SERPL-MCNC: 8.7 MG/DL (ref 8.8–10.4)
CHLORIDE SERPL-SCNC: 105 MMOL/L (ref 98–107)
CREAT SERPL-MCNC: 0.54 MG/DL (ref 0.51–0.95)
CRP SERPL-MCNC: 6.83 MG/L
CRP SERPL-MCNC: 9.44 MG/L
EGFRCR SERPLBLD CKD-EPI 2021: 89 ML/MIN/1.73M2
GLUCOSE SERPL-MCNC: 92 MG/DL (ref 70–99)
HCO3 SERPL-SCNC: 25 MMOL/L (ref 22–29)
HOLD SPECIMEN: NORMAL
MAGNESIUM SERPL-MCNC: 1.9 MG/DL (ref 1.7–2.3)
POTASSIUM SERPL-SCNC: 3.6 MMOL/L (ref 3.4–5.3)
POTASSIUM SERPL-SCNC: 3.6 MMOL/L (ref 3.4–5.3)
SODIUM SERPL-SCNC: 141 MMOL/L (ref 135–145)

## 2024-12-17 PROCEDURE — 36415 COLL VENOUS BLD VENIPUNCTURE: CPT | Performed by: HOSPITALIST

## 2024-12-17 PROCEDURE — 250N000011 HC RX IP 250 OP 636

## 2024-12-17 PROCEDURE — G0378 HOSPITAL OBSERVATION PER HR: HCPCS

## 2024-12-17 PROCEDURE — 80048 BASIC METABOLIC PNL TOTAL CA: CPT

## 2024-12-17 PROCEDURE — 96374 THER/PROPH/DIAG INJ IV PUSH: CPT

## 2024-12-17 PROCEDURE — 96376 TX/PRO/DX INJ SAME DRUG ADON: CPT | Mod: 59

## 2024-12-17 PROCEDURE — 250N000013 HC RX MED GY IP 250 OP 250 PS 637: Performed by: PHYSICIAN ASSISTANT

## 2024-12-17 PROCEDURE — G1010 CDSM STANSON: HCPCS

## 2024-12-17 PROCEDURE — 999N000128 HC STATISTIC PERIPHERAL IV START W/O US GUIDANCE

## 2024-12-17 PROCEDURE — 84132 ASSAY OF SERUM POTASSIUM: CPT | Performed by: HOSPITALIST

## 2024-12-17 PROCEDURE — 97116 GAIT TRAINING THERAPY: CPT | Mod: GP

## 2024-12-17 PROCEDURE — 96375 TX/PRO/DX INJ NEW DRUG ADDON: CPT

## 2024-12-17 PROCEDURE — 250N000013 HC RX MED GY IP 250 OP 250 PS 637: Performed by: HOSPITALIST

## 2024-12-17 PROCEDURE — 86140 C-REACTIVE PROTEIN: CPT

## 2024-12-17 PROCEDURE — 97110 THERAPEUTIC EXERCISES: CPT | Mod: GP

## 2024-12-17 PROCEDURE — 99232 SBSQ HOSP IP/OBS MODERATE 35: CPT

## 2024-12-17 PROCEDURE — 250N000011 HC RX IP 250 OP 636: Performed by: INTERNAL MEDICINE

## 2024-12-17 PROCEDURE — 250N000013 HC RX MED GY IP 250 OP 250 PS 637: Performed by: INTERNAL MEDICINE

## 2024-12-17 PROCEDURE — 250N000009 HC RX 250

## 2024-12-17 PROCEDURE — 999N000040 HC STATISTIC CONSULT NO CHARGE VASC ACCESS

## 2024-12-17 PROCEDURE — 83735 ASSAY OF MAGNESIUM: CPT | Performed by: HOSPITALIST

## 2024-12-17 RX ORDER — DIPHENHYDRAMINE HYDROCHLORIDE 50 MG/ML
50 INJECTION INTRAMUSCULAR; INTRAVENOUS ONCE
Status: COMPLETED | OUTPATIENT
Start: 2024-12-17 | End: 2024-12-17

## 2024-12-17 RX ORDER — POTASSIUM CHLORIDE 750 MG/1
10 TABLET, EXTENDED RELEASE ORAL ONCE
Status: COMPLETED | OUTPATIENT
Start: 2024-12-17 | End: 2024-12-17

## 2024-12-17 RX ORDER — METHYLPREDNISOLONE SODIUM SUCCINATE 40 MG/ML
40 INJECTION INTRAMUSCULAR; INTRAVENOUS EVERY 4 HOURS
Status: COMPLETED | OUTPATIENT
Start: 2024-12-17 | End: 2024-12-17

## 2024-12-17 RX ORDER — MAGNESIUM OXIDE 400 MG/1
400 TABLET ORAL EVERY 4 HOURS
Status: COMPLETED | OUTPATIENT
Start: 2024-12-17 | End: 2024-12-17

## 2024-12-17 RX ORDER — IOPAMIDOL 755 MG/ML
52 INJECTION, SOLUTION INTRAVASCULAR ONCE
Status: COMPLETED | OUTPATIENT
Start: 2024-12-17 | End: 2024-12-17

## 2024-12-17 RX ADMIN — Medication 400 MG: at 12:17

## 2024-12-17 RX ADMIN — DIPHENHYDRAMINE HYDROCHLORIDE 50 MG: 50 INJECTION INTRAMUSCULAR; INTRAVENOUS at 14:07

## 2024-12-17 RX ADMIN — DONEPEZIL HYDROCHLORIDE 5 MG: 5 TABLET, FILM COATED ORAL at 21:15

## 2024-12-17 RX ADMIN — POTASSIUM CHLORIDE 10 MEQ: 750 TABLET, EXTENDED RELEASE ORAL at 12:17

## 2024-12-17 RX ADMIN — Medication 400 MG: at 16:58

## 2024-12-17 RX ADMIN — IOPAMIDOL 52 ML: 755 INJECTION, SOLUTION INTRAVENOUS at 22:06

## 2024-12-17 RX ADMIN — MEMANTINE 10 MG: 10 TABLET ORAL at 08:27

## 2024-12-17 RX ADMIN — METHYLPREDNISOLONE SODIUM SUCCINATE 40 MG: 40 INJECTION, POWDER, FOR SOLUTION INTRAMUSCULAR; INTRAVENOUS at 14:07

## 2024-12-17 RX ADMIN — MEMANTINE 10 MG: 10 TABLET ORAL at 21:15

## 2024-12-17 RX ADMIN — METHYLPREDNISOLONE SODIUM SUCCINATE 40 MG: 40 INJECTION, POWDER, FOR SOLUTION INTRAMUSCULAR; INTRAVENOUS at 09:43

## 2024-12-17 RX ADMIN — Medication 5 MG: at 23:44

## 2024-12-17 RX ADMIN — SODIUM CHLORIDE 80 ML: 9 INJECTION, SOLUTION INTRAVENOUS at 22:06

## 2024-12-17 RX ADMIN — DIPHENHYDRAMINE HYDROCHLORIDE 50 MG: 50 INJECTION, SOLUTION INTRAMUSCULAR; INTRAVENOUS at 21:17

## 2024-12-17 ASSESSMENT — ACTIVITIES OF DAILY LIVING (ADL)
ADLS_ACUITY_SCORE: 49
ADLS_ACUITY_SCORE: 52
ADLS_ACUITY_SCORE: 49
ADLS_ACUITY_SCORE: 49
ADLS_ACUITY_SCORE: 52
ADLS_ACUITY_SCORE: 49
ADLS_ACUITY_SCORE: 49
ADLS_ACUITY_SCORE: 52
ADLS_ACUITY_SCORE: 49
ADLS_ACUITY_SCORE: 52
ADLS_ACUITY_SCORE: 49
ADLS_ACUITY_SCORE: 52
ADLS_ACUITY_SCORE: 49

## 2024-12-17 NOTE — PROGRESS NOTES
Observation goals  PRIOR TO DISCHARGE       Comments: List all  goals to be met before discharge:  - Diagnostic tests and consults completed and resulted=Met  - No further episodes of syncope and any new arrhythmia addressed with controlled heart rates=Met  - Vital signs normal or at patient baseline and orthostatic vitals are normal and patient not lightheaded with standing= Not Met  - Tolerating oral intake to maintain hydration=Met  - Safe disposition plan has been identified=Not Met  - Nurse to notify provider when observation goals have been met and patient is ready for discharge.

## 2024-12-17 NOTE — PLAN OF CARE
Orientation: alert and oriented x4- forgetful and can be hard of hearing    Vitals/Tele: vitals stable on room air except slightly elevated blood pressure    IV Access/drains: L PIV infusing 500mL bolus- SL afterwards    Diet: modcarb    Mobility: assist 1 gb/walker    GI/: can be incontinent at times- continent this shift    Wound/Skin: scattered bruising and blanchable redness to coccyx, scalp lac w/ 3 staples-dried drainage    Consults: PT/OT/SW    Discharge Plan: TCU pending placement      See Flow sheets for assessment

## 2024-12-17 NOTE — PROGRESS NOTES
PRIMARY Concern: Syncopal episode  SAFETY RISK Concerns (fall risk, behaviors, etc.): fall risk     Aggression Tool Color: green  Isolation/Type: special precautions, Covid +  Tests/Procedures for NEXT shift: Labs  Consults? (Pending/following, signed-off?) Cards signed off, PT, SW/CC following  Where is patient from? (Home, TCU, etc.): Home  Other Important info for NEXT shift: Positive orthos- 500ml bolus   Anticipated DC date & active delays: TCU referrals still pending. [facility] is considering, pending bed availability, but has not officially accepted yet.    _____________________________________________________________________________  SUMMARY NOTE:            Orientation/Cognitive: A&Ox3-4, forgetful  Observation Goals (Met/ Not Met): Not met  Mobility Level/Assist Equipment: A1 gb/w  Antibiotics & Plan (IV/po, length of tx left): na  Pain Management: Denies  Tele/VS/O2: VSS on RA except HTN. Tele: Afib CVR. Orthos positive this AM  ABNL Lab/BG: Replaced K and mag, rechecks in the am  Diet: mod carb  Bowel/Bladder: Incontinent of urine at times, cont of bowel  Skin Concerns: Scattered bruising, laceration on back of scalp- 3 staples in place, dried drainage   Drains/Devices: PIV SL  Patient Stated Goal for Today: To rest

## 2024-12-17 NOTE — PROGRESS NOTES
[facility]    Medicine Progress Note - Hospitalist Service    Date of Admission:  12/13/2024    Assessment & Plan   Carmen Zhang is a 84 year old female with PMHx including cognitive impairment, hypertension, and hyperlipidemia who presented to the ED and was subsequently admitted 12/13/2024 for the second time this week secondary to a syncopal event. She was found to have positive orthostatic blood pressures and tested positive for COVID. Upon arrival, patient noted to be in new a fib/flutter with CVR. Registered under observation status for further evaluation and treatment.      Initial syncopal episode described as a mechanical fall while getting out of a car at which time she hit her head. She presented to the ED 12/11 where a CT head was negative. Scalp laceration repaired. Patient had recurrent, unwitnessed syncope 12/12 at which time EMS was called and patient reportedly had positive orthostatics, but she was not brought to the ED. Subsequent to that, she has had 2-3 episodes of unwitnessed falls overnight while going to the bathroom. No CP, palpitations, prodromal dizziness. Pt does take ASA 81 mg po every day.      Syncope and collapse, multifactorial  Orthostatic hypotension   Generalized weakness  Posterior scalp lac s/p repair (12/11/2024)  *Likely multifactorial in the setting of orthostatic hypotension, new a fib/flutter with CVR, COVID infection.  *CMP with K 3.3, otherwise largely unremarkable. CBC with mild thrombocytopenia. COVID positive. CT head negative. EKG with Afib with CVR.  *Echo 4/2023 with EF 60-65%, no RWMA, grade II or moderate DD, no significant valve disease.   *S/P 0.5 L IVF bolus.   - Registered to observation.   - Telemetry remains in A-fib but rate controlled.  - Received IV fluids upon admission, now discontinued.    - Orthostatic vitals following encouragement of oral intake 12/17. mIVF 12/18 overnight. Repeat orthostatics AM.   - CT PE 12/17  pending given syncope in setting of AF and COVID. Negative for PE. Pleural effusion noted. Atelectasis noted. Continue I/S.   - PT consulted, recommending home with assist versus TCU.  Patient's  is now sick as well and would not be able to assist at home. Recommend TCU.  - Care transitions consulted regarding discharge planning, appreciate their assistance. Initial plan for discharge to TCU 12/18/2024 although didn't see patient was COVID positive and unable to take patient at this time.   - Posterior scalp staples can be removed on 12/20/2024 if wound is healing well.     Atrial fibrillation/flutter with CVR, new diagnosis  *[id number] score of at least 4.  - Telemetry.  - Cardiology consulted, appreciate their assistance. Recommend against anticoagulation at this time due to syncope and falls. Discontinue chlorthalidone. Rate OK without intervention, continue to monitor.  - Transthoracic echocardiogram on 12/14/2024 showed normal LVEF, no significant valve disease.  - Plan for remote cardiac monitor upon discharge and outpatient follow up with cardiology in 4 weeks.     COVID infection  *Vaccinated X2, but no recent boosters. Sx of weakness but no significant respiratory symptoms.  - Offered Paxlovid, but patient/spouse declined.  - Not hypoxic, no role for steroids at this time.  - Encourage ambulation.     Hypokalemia, resolved   - Replace and recheck per RN managed protocol.  - Magnesium within normal limits.     Thrombocytopenia  *Platelets 123 in the ED, likely reactive in the setting of viral infection.   - Platelets stable at 132 on 12/15. Continue to monitor with morning labs.      Hypertension  - PTA hydrochlorothiazide discontinued in setting of orthostatic hypotension and hypokalemia.   - Monitor blood pressure with routine vitals, will consider alternative blood pressure agent if needed going forward.     Hyperlipidemia  - Hold PTA atorvastatin while in observation status.     Cognitive  impairment  *Follows with Dr. Fish, last visit 8/19/24.   - Continue PTA Donepezil, Namenda, Seroquel.   - Delirium prevention protocol in place.   - Attempted to call  for discussion of care and discharge 12/17/2024 although no response. Will attempt again 12/18/2024 AM.      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  - Noted.     Observation Goals: List all  goals to be met before discharge:, - Diagnostic tests and consults completed and resulted, - No further episodes of syncope and any new arrhythmia addressed with controlled heart rates, - Vital signs normal or at patient baseline and orthostatic vitals are normal and patient not lightheaded with standing, - Tolerating oral intake to maintain hydration, - Safe disposition plan has been identified, - Nurse to notify provider when observation goals have been met and patient is ready for discharge.  Diet: Moderate Consistent Carb (60 g CHO per Meal) Diet  Room Service    DVT Prophylaxis: Enoxaparin (Lovenox) SQ  Hoff Catheter: Not present  Lines: None     Cardiac Monitoring: ACTIVE order. Indication: Syncope- high cardiac risk (48 hours)  Code Status: Full Code      Social Drivers of Health    Tobacco Use: Medium Risk (9/25/2024)    Received from [email or web address]    Patient History     Smoking Tobacco Use: Former     Smokeless Tobacco Use: Never    [facility]    Social Connections          Disposition Plan     Medically Ready for Discharge: Anticipated Tomorrow     The patient's care was discussed with the Attending Physician, Dr. Gamboa .    Cammy Brenner PA-C  Hospitalist Service  [facility]  Securely message with [employer] (more info)  Text page via [employer]  "Melvina/Directory   ______________________________________________________________________    Interval History   No acute events overnight. VSS. Afebrile. Patient continues to remain fatigued but feeling \"a little better\" since admission although notes continued fatigue and dry cough. Head laceration continues to feel improved although still mildly tender to palpation. She denies chest pain, SOB, lightheadedness, palpitations, abdominal pain, N/V, urinary or bowel changes. Awaiting TCU. Reports she wants to go there otherwise she will feel as though she is unable to rest at home given her  also ill. Tolerating oral intake. Encouraged oral intake. I/S at bedside. No additional concerns or complaints at this time.     Physical Exam   Vital Signs: Temp: 97.8  F (36.6  C) Temp src: Oral BP: 130/70 Pulse: 71   Resp: 16 SpO2: 97 % O2 Device: None (Room air)    Weight: 0 lbs 0 oz    GENERAL:  Pleasant, cooperative, alert. Patient laying in bed in no acute distress. Intermittent dry cough noted.   HEENT:  Mild tenderness to palpation surrounding posterior scalp laceration with staples.   PULMONOLOGY: LS diminished bilateral bases. I/S at bedside. No increased work of breathing.   CARDIAC: Irregular rhythm.   ABDOMEN: Soft, nontender non distended. BS+.   MUSCULOSKELETAL:  Moving x 4 spontaneously.  Normal bulk and tone.  No LE edema.  Radial pulses 2+ bilaterally.    NEURO: Alert and oriented x3. Nonfocal exam.    Medical Decision Making       45 MINUTES SPENT BY ME on the date of service doing chart review, history, exam, documentation & further activities per the note.      Data   ------------------------- PAST 24 HR DATA REVIEWED -----------------------------------------------    I have personally reviewed the following data over the past 24 hrs:    N/A  \   N/A   / N/A     141 105 13.2 /  92   3.6; 3.6 25 0.54 \     Procal: N/A CRP: 6.83 (H) Lactic Acid: N/A         Imaging results reviewed over the past 24 hrs: "   Recent Results (from the past 24 hours)   CT Chest Pulmonary Embolism w Contrast    Narrative    EXAM: CT CHEST PULMONARY EMBOLISM W CONTRAST  LOCATION: [facility]  DATE: 12/17/2024    INDICATION: Syncope in setting of COVID; Assess for PE versus other acute pathology  COMPARISON: CTA chest 04/29/2023 and CT chest 06/12/2019  TECHNIQUE: CT chest pulmonary angiogram during arterial phase injection of IV contrast. Multiplanar reformats and MIP reconstructions were performed. Dose reduction techniques were used.   CONTRAST: 52mL Isovue 370    FINDINGS:  ANGIOGRAM CHEST: Pulmonary arteries are normal caliber and negative for pulmonary emboli. Thoracic aorta is negative for dissection. No CT evidence of right heart strain.    LUNGS AND PLEURA: Small right pleural effusion. Subsegmental atelectasis right middle lobe and both lower lobes. Calcified granulomas both lower lobes. 1.4 cm nodular scarring within the lingula, unchanged. No acute infiltrates.    MEDIASTINUM/AXILLAE: Normal. No adenopathy.    CORONARY ARTERY CALCIFICATION: None.    UPPER ABDOMEN: Atherosclerotic disease abdominal aorta. 4.4 cm left renal cyst.    MUSCULOSKELETAL: Normal.      Impression    IMPRESSION:  1.  No evidence for pulmonary emboli.  2.  Trace right pleural fusion. Subsegmental atelectasis both lower lobes. Otherwise no acute pulmonary disease.

## 2024-12-17 NOTE — PROGRESS NOTES
Observation goals  PRIOR TO DISCHARGE       Comments: List all  goals to be met before discharge:  - Diagnostic tests and consults completed and resulted- not met  - No further episodes of syncope and any new arrhythmia addressed with controlled heart rates- Met  - Vital signs normal or at patient baseline and orthostatic vitals are normal and patient not lightheaded with standing- Not Met  - Tolerating oral intake to maintain hydration- Met  - Safe disposition plan has been identified- Not Met

## 2024-12-17 NOTE — PLAN OF CARE
PRIMARY Concern: Syncopal episode  SAFETY RISK Concerns (fall risk, behaviors, etc.): fall risk     Aggression Tool Color: green  Isolation/Type: special precautions, Covid +  Tests/Procedures for NEXT shift: Labs  Consults? (Pending/following, signed-off?) Cards signed off, PT, SW/CC following  Where is patient from? (Home, TCU, etc.): Home  Other Important info for NEXT shift: Positive orthos- 500ml bolus   Anticipated DC date & active delays: TCU referrals still pending. [facility] is considering, pending bed availability, but has not officially accepted yet.    _____________________________________________________________________________  SUMMARY NOTE:            Orientation/Cognitive: A&Ox3-4, forgetful  Observation Goals (Met/ Not Met): Not met  Mobility Level/Assist Equipment: A1 gb/w  Antibiotics & Plan (IV/po, length of tx left): na  Pain Management: Denies  Tele/VS/O2: VSS on RA except HTN. Tele: Afib CVR. Orthos positive this AM  ABNL Lab/BG: K and mag, rechecks in the am  Diet: mod carb  Bowel/Bladder: Incontinent of urine at times, cont of bowel  Skin Concerns: Scattered bruising, laceration on back of scalp- 3 staples in place, dried drainage   Drains/Devices: PIV SL

## 2024-12-17 NOTE — PROVIDER NOTIFICATION
MD Notification    Notified Person: MD    Notified Person Name: Sameer    Notification Date/Time: 10:38pm    Notification Interaction: Amcom page    Purpose of Notification: FYI Positive orthostatics post bolus, asymptomatic.    Orders Received: N/A    Comments:

## 2024-12-17 NOTE — PROGRESS NOTES
Care Management Follow Up    Length of Stay (days): 0    Expected Discharge Date: 12/18/2024     Concerns to be Addressed: discharge planning     Patient plan of care discussed at interdisciplinary rounds: Yes    Anticipated Discharge Disposition: Transitional Care              Anticipated Discharge Services: None  Anticipated Discharge DME: [facility]    Patient/family educated on Medicare website which has current facility and service quality ratings: yes (medicare.gov/care-compare)  Education Provided on the Discharge Plan: Yes  Patient/Family in Agreement with the Plan: yes    Referrals Placed by CM/SW:  (TCU referrals)  Private pay costs discussed: Not applicable    Discussed  Partnership in Safe Discharge Planning  document with patient/family: Yes     Handoff Completed: No, handoff not indicated or clinically appropriate    Additional Information: SANJU informed that patient was accepted to [facility]. [facility] will have a bed for patient anytime after 11am on 12/18. SANJU contacted patients daughter, Nancy ([phone]) to let her know of TCU option. Nancy stated that was their first choice and she is agreeable to placement. SANJU and Nancy discussed transport options as family could transport patient through the skyway, or she could be transported via stretcher due to covid.     SANJU was informed that Nancy is unable to transport patient as she has covid herself, but Nancy is going to reach out to her brother Lennox to see if he can. Nancy will contact SANJU with update.    Addendum 1505: PAS completed. SANJU informed by patients daughter, Nancy, that her brother can transport patient by wheelchair through the skyway around 1pm on 12/18. Nancy will let SANJU know if anything changes.    PAS-RR    D: Per DHS regulation, SANJU completed and submitted PAS-RR to [place] [employer] Direct Connect via the [employer] Line.  PAS-RR confirmation # is : [id number]    I: SANJU spoke with patients daughter and they are aware a PAS-RR has been  submitted.  SANJU reviewed with pt's daughter that they may be contacted for a follow up appointment within 10 days of hospital discharge if their SNF stay is < 30 days.  Contact information for Senior [employer] Line was also provided.    A: pt's daughter verbalized understanding.    P: Further questions may be directed to Senior LinkAge Line at #[phone], option #4 for PAS-RR staff.     Next Steps: obtain and fax orders, continue to follow    BREEZY Hernadez

## 2024-12-18 ENCOUNTER — DOCUMENTATION ONLY (OUTPATIENT)
Dept: GERIATRICS | Facility: CLINIC | Age: 84
End: 2024-12-18
Payer: MEDICARE

## 2024-12-18 VITALS
TEMPERATURE: 97.9 F | OXYGEN SATURATION: 96 % | RESPIRATION RATE: 16 BRPM | HEART RATE: 78 BPM | DIASTOLIC BLOOD PRESSURE: 59 MMHG | SYSTOLIC BLOOD PRESSURE: 114 MMHG

## 2024-12-18 LAB
ANION GAP SERPL CALCULATED.3IONS-SCNC: 7 MMOL/L (ref 7–15)
BUN SERPL-MCNC: 16.2 MG/DL (ref 8–23)
CALCIUM SERPL-MCNC: 9.1 MG/DL (ref 8.8–10.4)
CHLORIDE SERPL-SCNC: 106 MMOL/L (ref 98–107)
CREAT SERPL-MCNC: 0.54 MG/DL (ref 0.51–0.95)
EGFRCR SERPLBLD CKD-EPI 2021: 89 ML/MIN/1.73M2
ERYTHROCYTE [DISTWIDTH] IN BLOOD BY AUTOMATED COUNT: 12.5 % (ref 10–15)
GLUCOSE SERPL-MCNC: 123 MG/DL (ref 70–99)
HCO3 SERPL-SCNC: 26 MMOL/L (ref 22–29)
HCT VFR BLD AUTO: 33.4 % (ref 35–47)
HGB BLD-MCNC: 11.7 G/DL (ref 11.7–15.7)
MAGNESIUM SERPL-MCNC: 2.1 MG/DL (ref 1.7–2.3)
MCH RBC QN AUTO: 32.1 PG (ref 26.5–33)
MCHC RBC AUTO-ENTMCNC: 35 G/DL (ref 31.5–36.5)
MCV RBC AUTO: 92 FL (ref 78–100)
PLATELET # BLD AUTO: 177 10E3/UL (ref 150–450)
POTASSIUM SERPL-SCNC: 3.9 MMOL/L (ref 3.4–5.3)
RBC # BLD AUTO: 3.64 10E6/UL (ref 3.8–5.2)
SODIUM SERPL-SCNC: 139 MMOL/L (ref 135–145)
WBC # BLD AUTO: 6 10E3/UL (ref 4–11)

## 2024-12-18 PROCEDURE — 83735 ASSAY OF MAGNESIUM: CPT | Performed by: HOSPITALIST

## 2024-12-18 PROCEDURE — 250N000013 HC RX MED GY IP 250 OP 250 PS 637: Performed by: PHYSICIAN ASSISTANT

## 2024-12-18 PROCEDURE — 36415 COLL VENOUS BLD VENIPUNCTURE: CPT

## 2024-12-18 PROCEDURE — 84520 ASSAY OF UREA NITROGEN: CPT

## 2024-12-18 PROCEDURE — 80048 BASIC METABOLIC PNL TOTAL CA: CPT

## 2024-12-18 PROCEDURE — 85027 COMPLETE CBC AUTOMATED: CPT

## 2024-12-18 PROCEDURE — 99239 HOSP IP/OBS DSCHRG MGMT >30: CPT

## 2024-12-18 PROCEDURE — G0378 HOSPITAL OBSERVATION PER HR: HCPCS

## 2024-12-18 PROCEDURE — 258N000003 HC RX IP 258 OP 636

## 2024-12-18 RX ORDER — SODIUM CHLORIDE 9 MG/ML
INJECTION, SOLUTION INTRAVENOUS CONTINUOUS
Status: ACTIVE | OUTPATIENT
Start: 2024-12-18 | End: 2024-12-18

## 2024-12-18 RX ORDER — SODIUM CHLORIDE 9 MG/ML
INJECTION, SOLUTION INTRAVENOUS CONTINUOUS
Status: DISCONTINUED | OUTPATIENT
Start: 2024-12-18 | End: 2024-12-18

## 2024-12-18 RX ORDER — ACETAMINOPHEN 325 MG/1
650 TABLET ORAL EVERY 4 HOURS PRN
COMMUNITY
Start: 2024-12-18

## 2024-12-18 RX ADMIN — SODIUM CHLORIDE: 9 INJECTION, SOLUTION INTRAVENOUS at 13:22

## 2024-12-18 RX ADMIN — MEMANTINE 10 MG: 10 TABLET ORAL at 13:21

## 2024-12-18 ASSESSMENT — ACTIVITIES OF DAILY LIVING (ADL)
ADLS_ACUITY_SCORE: 49

## 2024-12-18 NOTE — PLAN OF CARE
Orientation A&Ox4, forgetful    Fall risk: yes    Tests/Procedures: CT completed. No PE found.     Vitals/02/Tele: VSS/RA/Orthos positive yesterday/AFIB CVR   IV Access/drains: LPIV  Diet: CHO  Mobility: Ax1 GB/W  GI/: Incontinent of urine at times, cont of bowel  Wound/Skin: Laceration on back of scalp-3 staples in place, dried drainage   Consults: PT  Discharge Plan:Discharging to [facility] living today @ 1330.  Other Important info: New orders for  orthostatic BPS to be taken q8hrs. Orders received for continuous IV fluids, pt refused.

## 2024-12-18 NOTE — PROVIDER NOTIFICATION
MD Notification    Notified Person: MD    Notified Person Name: Dr Estrella    Notification Date/Time: 12/17 2050    Notification Interaction: Vocera text    Purpose of Notification: pt is scheduled to have a CT with contrast tonight. She has a contrast allergy and is supposed to be premedicated. She got Solu Medrol earlier, but was given IV Benadryl around 1400...the order says she's supposed to get the Benadryl 1 hour before the scan. Wondering if you can order another one time dose of the Benadryl for tonight?     Orders Received:    Comments:

## 2024-12-18 NOTE — PROGRESS NOTES
Care Management Follow Up    Length of Stay (days): 0    Expected Discharge Date: 12/18/2024     Concerns to be Addressed: discharge planning     Patient plan of care discussed at interdisciplinary rounds: Yes    Anticipated Discharge Disposition: Transitional Care      Anticipated Discharge Services: None  Anticipated Discharge DME: Kaleb    Patient/family educated on Medicare website which has current facility and service quality ratings: yes (medicare.gov/care-compare)  Education Provided on the Discharge Plan: Yes  Patient/Family in Agreement with the Plan: yes    Referrals Placed by CM/SW: Post Acute Facilities, [facility]  Private pay costs discussed: insurance costs out of pocket expenses    Discussed  Partnership in Safe Discharge Planning  document with patient/family: No     Handoff Completed: No, handoff not indicated or clinically appropriate    Additional Information:  Patient has been [facility], in a private room (due to covid isolation). Son Lennox planned on transporting via skyway at 1300.  Spoke with daughter Nancy on the phone to relay private pay costs for TCU, as the costs were not discussed yet with family. Informed Nancy that due to insurance requirements and observation stay, TCU would be private pay. Informed Nancy of [facility] up front deposit cost and average daily rate costs. Nancy states being surprised by this as it was not talked about yesterday with her, but she did remember hearing it'd be private pay when talking with [facility]CC initially. Nancy questioned if pt's secondary plan would cover, informed that unfortunately it wouldn't override Medicare's requirements, but we did have the TCU run benefits to check. Nancy states she'd like to call another family member and will call SANJU back.    Addendum 1122: Received call from spouse Sukhdeep requesting a further update on financials for TCU. Updated Sukhdeep on above conversation with Nancy. Sukhdeep requested more time to look into their  LTC policy and . Did update Sukhdeep that a secondary plan would not override [facility]'s requirements for TCU, which is why it's private pay. Informed Sukhdeep that typically LTC plans do not cover short-term rehab but he is welcome to call his policy. He states he will follow up with SW. Updated Maura and hospitalist.     Addendum 1405: Spoke with spouse Sukhdeep again, who states he plans to submit a denial claim to Medicare, to then send to [facility] to see if they will pick this up. Informed Sukhdeep this would have to be done once pt is already at the TCU, since the hospital wouldn't submit this claim since no services are being started during the hospital stay. Sukhdeep states understanding, suggested he connect with the billing office at the TCU once pt is there. Sukhdeep asked for the billing office number for the hospital, provided him with this. Sukhdeep states he is in agreement with pt going to [facility] today, private pay, states son is still transporting and should be at the hospital now, says they will be providing a check for the deposit. Updated Maura, hospitalist, bedside RN and charge RN.    Denice Karimi, LUIS ALBERTO  Social Work  [facility]

## 2024-12-18 NOTE — PROGRESS NOTES
Observation goals  PRIOR TO DISCHARGE       - Diagnostic tests and consults completed and resulted- Met  - No further episodes of syncope and any new arrhythmia addressed with controlled heart rates- Met  - Vital signs normal or at patient baseline and orthostatic vitals are normal and patient not lightheaded with standing- Met  - Tolerating oral intake to maintain hydration- Met  - Safe disposition plan has been identified- Met

## 2024-12-18 NOTE — PROGRESS NOTES
Care Management Discharge Note    Discharge Date: 12/18/2024       Discharge Disposition: Transitional Care    Discharge Services: None    Discharge DME: Walker    Discharge Transportation: Family via skyway at 1700    Private pay costs discussed: insurance costs out of pocket expenses    Does the patient's insurance plan have a 3 day qualifying hospital stay waiver?  No    PAS Confirmation Code: [id number]  Patient/family educated on Medicare website which has current facility and service quality ratings: yes (medicare.gov/care-compare)    Education Provided on the Discharge Plan: Yes  Persons Notified of Discharge Plans: Hospitalist, ERICKSON, Charge RN, bedside RN, family  Patient/Family in Agreement with the Plan: yes    Handoff Referral Completed: No, handoff not indicated or clinically appropriate    Additional Information:  [facility] PAS completed previously. Faxed discharge orders to TCU and updated admissions. Son in room will transport pt via skyway with borrowed wheelchair, bedside RN and Charge RN updated on plan.     LUIS ALBERTO Romero  Social Work  [facility]

## 2024-12-18 NOTE — DISCHARGE SUMMARY
[facility]  Hospitalist Discharge Summary      Date of Admission:  12/13/2024  Date of Discharge:  12/18/2024  6:17 PM  Discharging Provider: Cammy Brenner PA-C  Discharge Service: Hospitalist Service    Discharge Diagnoses   Syncope and collapse, multifactorial  Orthostatic hypotension, resolved  Generalized weakness  Posterior scalp lac s/p repair (12/11/2024)  Atrial fibrillation/flutter with CVR, new diagnosis  COVID infection  Hypokalemia, resolved   Thrombocytopenia, resolved  Hypertension  HLD  Cognitive impairment  History of thoracic mass resection     Clinically Significant Risk Factors          Follow-ups Needed After Discharge   Follow Up and recommended labs and tests    Follow up with longterm physician.  The following labs/tests are recommended: CBC, BMP, Mg and Phos. Posterior scalp staples can be removed on 12/20/2024 if wound is healing well.    Follow up with Cardiology within four weeks upon discharge for hospital follow-up. A member of the Cardiology scheduling team will be reaching out to schedule this appointment, however, should you not hear from them or have additional questions for your care team please contact [phone].      Discharge Disposition   Discharged to short-term care facility  Condition at discharge: Stable    Hospital Course   Carmen Zhang is a 84 year old female with PMHx including cognitive impairment, hypertension, and hyperlipidemia who presented to the ED and was subsequently admitted 12/13/2024 for the second time this week secondary to a syncopal event. She was found to have positive orthostatic blood pressures and tested positive for COVID. Upon arrival, patient also noted to be in new a fib/flutter with CVR. Registered under observation status for further evaluation and treatment.      Initial syncopal episode described as a mechanical fall while getting out of a car at which time she hit her head. She presented to the  ED 12/11 where a CT head was negative. Scalp laceration repaired. Patient had recurrent, unwitnessed syncope 12/12 at which time EMS was called and patient reportedly had positive orthostatics, but she was not brought to the ED. Subsequent to that, she has had 2-3 episodes of unwitnessed falls overnight while going to the bathroom. No CP, palpitations, prodromal dizziness. Patient on PTA ASA 81 mg daily. History of orthostatic hypotension in setting of syncope as far back as 2020 noted.      Syncope and collapse, multifactorial  Orthostatic hypotension, resolved  Generalized weakness  Posterior scalp lac s/p repair (12/11/2024)  *Suspected multifactorial in the setting of orthostatic hypotension, new a fib/flutter with CVR and COVID infection. History of orthostatic hypotension in setting of syncope as far back as 2020 noted.   *Echo 4/2023 with EF 60-65%, no RWMA, grade II or moderate DD, no significant valve disease.   *Lab work-up in the ED notable for CMP with K 3.3, otherwise largely unremarkable. CBC with mild thrombocytopenia. COVID positive. CT head negative. EKG with Afib with CVR. Given 0.5 L IVF bolus in ED.   - Registered to observation. Telemetry largely atrial fibrillation but rate controlled. Received IV fluids upon admission. CT PE 12/17 pending given syncope in setting of AF and COVID. Negative for PE. Pleural effusion noted. Atelectasis noted. I/S at bedside. PT consulted and recommending TCU given patient's  is now sick as well and would not be able to assist at home. Orthostatic hypotension noted again 12/18 AM but resolved with small fluid bolus. Increased oral intake encouraged and patient with increased oral intake prior to discharge.Discharged to TCU with PT. Posterior scalp staples can be removed on 12/20/2024 if wound is healing well.     Atrial fibrillation/flutter with CVR, new diagnosis  *[id number] score of at least 4.  - Telemetry. Cardiology consulted, appreciate their  assistance. Recommend against anticoagulation at this time due to syncope and falls and discontinuation of chlorthalidone. Rate has remained controlled. Transthoracic echocardiogram on 12/14/2024 showed normal LVEF, no significant valve disease. Patient discharged to TCU with plan for zio patch upon discharge and outpatient follow up with cardiology in 4 weeks.     COVID infection  *Vaccinated X2, but no recent boosters. Sx of weakness but no significant respiratory symptoms. Offered Paxlovid, but patient/spouse declined. Not hypoxic, no role for steroids at this time. Continued to remain non-hypoxic without SOB, chest pain, lightheadedness or symptoms of pre-syncope. Discharged to TCU.      Hypokalemia, resolved   *Replaced and recheck per RN managed protocol.     Thrombocytopenia, resolved  *Platelets 123 in the ED, likely reactive in the setting of viral infection. Resolved prior to discharge ( 12/19).      Hypertension  On PTA hydrochlorothiazide that was discontinued in setting of orthostatic hypotension and hypokalemia. Monitor blood pressure with routine vitals with consideration of alternative blood pressure agent as needed in future.      Hyperlipidemia  - Continued PTA atorvastatin upon discharge.     Cognitive impairment  *Follows with Dr. Fish, last visit 8/19/24. Delirium prevention protocol in place throughout hospitalization.   - Continued PTA Donepezil, Namenda, Seroquel.      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    Consultations This Hospital Stay   PHARMACY LIAISON FOR MEDICATION COVERAGE CONSULT  PHYSICAL THERAPY ADULT IP CONSULT  CARE MANAGEMENT / SOCIAL WORK IP CONSULT  CARDIOLOGY IP CONSULT  VASCULAR ACCESS ADULT IP CONSULT  PHYSICAL THERAPY ADULT IP CONSULT    Code Status   Full Code    Time Spent on  this Encounter   I, Cammy Brenner PA-C, personally saw the patient today and spent greater than 30 minutes discharging this patient. I discussed this patient with the attending physician, Dr. Hanson.       Cammy Brenner PA-C  [facility] EXTENDED [facility]OV[facility] AND SHORT STAY  [address] [address]  Phone: [phone]  ______________________________________________________________________    Physical Exam   Vital Signs: Temp: 97.9  F (36.6  C) Temp src: Oral BP: 114/59 Pulse: 78   Resp: 16 SpO2: 96 % O2 Device: None (Room air)    Weight: 0 lbs 0 oz    GENERAL:  Pleasant, cooperative, alert. Patient laying in bed in no acute distress. Intermittent dry cough noted.   HEENT:  Mild tenderness to palpation surrounding posterior scalp laceration. Healing appropriately.   PULMONOLOGY: LS diminished bilateral bases. I/S at bedside. No increased work of breathing.   CARDIAC: Irregular rhythm.   ABDOMEN: Soft, nontender non distended. BS+.   MUSCULOSKELETAL:  Moving x 4 spontaneously.  Normal bulk and tone.  No LE edema.  Radial pulses 2+ bilaterally.    NEURO: Alert and oriented x3. Nonfocal exam.     Primary Care Physician   Alison Hernandez    Discharge Orders      ZIO PATCH 8-14 DAYS APPLICATION     General info for SNF    Length of Stay Estimate: Short Term Care: Estimated # of Days <30  Condition at Discharge: Stable  Level of care:skilled   Rehabilitation Potential: Fair  Admission H&P remains valid and up-to-date: Yes  Recent Chemotherapy: N/A  Use Nursing Home Standing Orders: Yes     Mantoux instructions    Give two-step Mantoux (PPD) Per Facility Policy Yes     Reason for your hospital stay    You were admitted for concerns of syncope. You were found to have an abnormal heart rhythm called atrial fibrillation/flutter in the setting of a COVID diagnosis. We suspect this heart rhythm may have been triggered by your COVID infection and dehydration. You had an  echocardiogram done that showed preserved left ventricular function with no significant valvular abnormalities. Cardiology was consulted, as well, who recommended discharging with a remote cardiac monitor called the Zio Patch to assess the frequency of this abnormal heart rhythm as you recover from your COVID infection. Cardiology also recommended discontinuing your chlorthalidone at this time given your symptoms and following-up with Cardiology outpatient in four weeks for continued evaluation and management.     Intake and output    Every shift     Activity - Up with assistive device     Encourage PO fluids     Wound care (specify)    Site:   Posterior head   Instructions:  Evaluate daily and keep clean and dry.     Follow Up and recommended labs and tests    Follow up with care home physician.  The following labs/tests are recommended: CBC, BMP, Mg and Phos.    Follow up with Cardiology within four weeks upon discharge for hospital follow-up. A member of the Cardiology scheduling team will be reaching out to schedule this appointment, however, should you not hear from them or have additional questions for your care team please contact [phone].     Additional Discharge Instructions    Continue incentive spirometry once every two hours while awake.     Full Code     Physical Therapy Adult Consult    Evaluate and treat as clinically indicated.    Reason:  Deconditioning.     Contact and Droplet Isolation     ZIO PATCH 8-14 DAYS (additional cost to patient)    This order will incur an additional cost to the patient. If the patient doesn't want to pay cost for application, please consider the Zio Patch Mail Out order.     Fall precautions     Compression Sleeve/Stocking Order    Compression Sleeve/Stocking Documentation:   The patient needs compression stockings for Other reason: Orthostasis    I, the undersigned, certify that the above prescribed supplies are medically necessary for this patient and is both  reasonable and necessary in reference to accepted standards of medical and necessary in reference to accepted standards of medical practice in the treatment of this patient's condition and is not prescribed as a convenience.     Diet    Follow this diet upon discharge: Current Diet:Orders Placed This Encounter      Room Service      Moderate Consistent Carb (60 g CHO per Meal) Diet     Zio Patch Mail Out       Significant Results and Procedures   Most Recent 3 CBC's:  Recent Labs   Lab Test 12/18/24  0739 12/15/24  0709 12/14/24  0723   WBC 6.0 3.8* 4.8   HGB 11.7 12.1 11.5*   MCV 92 93 95    132* 127*     Most Recent 3 BMP's:  Recent Labs   Lab Test 12/18/24  0739 12/17/24  0719 12/16/24  0746 12/15/24  0709    141  --  138   POTASSIUM 3.9 3.6  3.6 3.5 3.5   CHLORIDE 106 105  --  104   CO2 26 25  --  26   BUN 16.2 13.2  --  16.0   CR 0.54 0.54  --  0.47*   ANIONGAP 7 11  --  8   FLAKO 9.1 8.7*  --  8.3*   * 92  --  91     Most Recent 2 LFT's:  Recent Labs   Lab Test 12/13/24  1224 12/11/24  2109   AST 28 33   ALT 20 29   ALKPHOS 46 65   BILITOTAL 0.4 0.7     Most Recent 3 Troponin's:  Recent Labs   Lab Test 06/28/21  0659 06/28/21  0300 06/27/21  2243   TROPI <0.015 <0.015 <0.015     7-Day Micro Results       Collected Updated Procedure Result Status      12/13/2024 1228 12/13/2024 1321 Influenza A/B, RSV and SARS-CoV2 PCR (COVID-19) Nasopharyngeal [[id number]]    (Abnormal)   Swab from Nasopharyngeal    Final result Component Value   Influenza A PCR Negative   Influenza B PCR Negative   RSV PCR Negative   SARS CoV2 PCR Positive   POSITIVE: SARS-CoV-2 (COVID-19) RNA detected, presumed positive.                  Most Recent TSH and T4:  Recent Labs   Lab Test 12/13/24  1224 01/04/20  0938 08/08/17  0857   TSH 0.84   < > 2.33   T4  --   --  0.97    < > = values in this interval not displayed.   ,   Results for orders placed or performed during the hospital encounter of 12/13/24   CT Head w/o  Contrast    Narrative    CT SCAN OF THE HEAD WITHOUT CONTRAST   12/13/2024 1:19 PM     HISTORY: recurrent falls    TECHNIQUE:  Axial images of the head and coronal reformations without  IV contrast material. Radiation dose for this scan was reduced using  automated exposure control, adjustment of the mA and/or kV according  to patient size, or iterative reconstruction technique.    COMPARISON: 12/11/2024    FINDINGS: There is no evidence of intracranial hemorrhage, mass, acute  infarct or anomaly. The ventricles are normal in size, shape and  configuration. Mild diffuse parenchymal volume loss. Mild patchy  periventricular white matter hypodensities which are nonspecific, but  likely related to chronic microvascular ischemic disease.     No significant paranasal sinus or mastoid inflammatory changes. Prior  left canal wall up mastoidectomy. The bony calvarium and bones of the  skull base appear intact. Left parietal scalp hematoma.      Impression    IMPRESSION:   No acute intracranial abnormality.      LEVI MONDRAGON MD         SYSTEM ID:  [id number]   XR Chest 2 Views    Narrative    EXAM: XR CHEST 2 VIEWS  LOCATION: [facility]  DATE: 12/13/2024    INDICATION: Covid infection, r o infiltrate  COMPARISON: 5/22/2023 and 4/15/2023      Impression    IMPRESSION: Shallow inspiration exaggerates heart size, pulmonary vascularity and bronchopulmonary lung markings. Thin band of chronic linear atelectasis right lower lung laterally unchanged. Lungs otherwise clear. Heart size and pulmonary vascularity   within normal limits and unchanged.   CT Chest Pulmonary Embolism w Contrast    Narrative    EXAM: CT CHEST PULMONARY EMBOLISM W CONTRAST  LOCATION: [facility]  DATE: 12/17/2024    INDICATION: Syncope in setting of COVID; Assess for PE versus other acute pathology  COMPARISON: CTA chest 04/29/2023 and CT chest 06/12/2019  TECHNIQUE: CT chest pulmonary angiogram during  arterial phase injection of IV contrast. Multiplanar reformats and MIP reconstructions were performed. Dose reduction techniques were used.   CONTRAST: 52mL Isovue 370    FINDINGS:  ANGIOGRAM CHEST: Pulmonary arteries are normal caliber and negative for pulmonary emboli. Thoracic aorta is negative for dissection. No CT evidence of right heart strain.    LUNGS AND PLEURA: Small right pleural effusion. Subsegmental atelectasis right middle lobe and both lower lobes. Calcified granulomas both lower lobes. 1.4 cm nodular scarring within the lingula, unchanged. No acute infiltrates.    MEDIASTINUM/AXILLAE: Normal. No adenopathy.    CORONARY ARTERY CALCIFICATION: None.    UPPER ABDOMEN: Atherosclerotic disease abdominal aorta. 4.4 cm left renal cyst.    MUSCULOSKELETAL: Normal.      Impression    IMPRESSION:  1.  No evidence for pulmonary emboli.  2.  Trace right pleural fusion. Subsegmental atelectasis both lower lobes. Otherwise no acute pulmonary disease.   Echocardiogram Complete     Value    LVEF  55-60%    Narrative    [id number]  [id number]  [id number]  [id number]^ROBERT^CHAD^GABRIELA     [facility]  Echocardiography Laboratory  [address] [place] [address]     Name: MAGGY UP  MRN: [id number]  : 1940  Study Date: 2024 07:35 AM  Age: 84 yrs  Gender: Female  Patient Location: [facility]  Reason For Study: Atrial Fibrillation  Ordering Physician: CHAD JONES  Referring Physician: Alison Hernandez  Performed By: Doroteo Jerez     BSA: 1.4 m2  Height: 62 in  Weight: 103 lb  HR: 74  BP: 100/55 mmHg  ______________________________________________________________________________  Procedure  Complete Echo Adult. Definity (NDC #72409-498) given intravenously.  ______________________________________________________________________________  Interpretation Summary     The left ventricle is normal in size.  There is normal left ventricular wall thickness.  The visual  ejection fraction is 55-60%.  No regional wall motion abnormalities noted.  The right ventricle is normal in structure, function and size.  There is no hemodynamically significant valve disease  Compared to the prior study from 4/2023, there is no significant change  ______________________________________________________________________________  Left Ventricle  The left ventricle is normal in size. There is normal left ventricular wall  thickness. The visual ejection fraction is 55-60%. No regional wall motion  abnormalities noted.     Right Ventricle  The right ventricle is normal in structure, function and size.     Atria  The left atrium is mild to moderately dilated. The right atrium is mild to  moderately dilated.     Mitral Valve  The mitral valve is normal in structure and function. There is physiologic  mitral regurgitation.     Tricuspid Valve  The tricuspid valve is normal in structure and function. There is physiologic  tricuspid regurgitation. The right ventricular systolic pressure is  approximated at 27.3 mmHg plus the right atrial pressure.     Aortic Valve  The aortic valve is trileaflet. There is physiologic aortic regurgitation. No  hemodynamically significant valvular aortic stenosis.     Pulmonic Valve  The pulmonic valve is not well seen, but is grossly normal.     Vessels  Normal size aorta.     Pericardium  The pericardium appears normal.     ______________________________________________________________________________  MMode/2D Measurements & Calculations  IVSd: 0.76 cm  LVIDd: 3.7 cm  LVIDs: 2.8 cm  LVPWd: 0.97 cm  FS: 24.5 %  LV mass(C)d: 91.3 grams  LV mass(C)dI: 63.3 grams/m2     Ao root diam: 2.4 cm  LVOT diam: 1.8 cm  LVOT area: 2.6 cm2  Ao root diam index Ht(cm/m): 1.5  Ao root diam index BSA (cm/m2): 1.7  RV Base: 2.5 cm  RWT: 0.53  TAPSE: 1.8 cm     Doppler Measurements & Calculations  MV E max babar: 98.5 cm/sec  MV dec slope: 399.7 cm/sec2  MV dec time: 0.25 sec  PA acc time: 0.13  sec  TR max pepe: 261.5 cm/sec  TR max P.3 mmHg  E/E' av.6  Lateral E/e': 9.1  Medial E/e': 8.2     RV S Pepe: 7.3 cm/sec     ______________________________________________________________________________  Report approved by: Margarita Alvarez MD on 2024 10:23 AM             Discharge Medications   Discharge Medication List as of 2024  5:24 PM        START taking these medications    Details   acetaminophen (TYLENOL) 325 MG tablet Take 2 tablets (650 mg) by mouth every 4 hours as needed for mild pain., OTC           CONTINUE these medications which have NOT CHANGED    Details   acyclovir (ZOVIRAX) 400 MG tablet Take 400 mg by mouth as needed (cold sores)., Historical      Ascorbic Acid (VITAMIN C PO) Take 1 tablet by mouth daily as needed (when feeling under the weather)., Historical      aspirin 81 MG EC tablet Take 81 mg by mouth at bedtime., Historical      atorvastatin (LIPITOR) 10 MG tablet Take 1 tablet (10 mg) by mouth daily Pt is requesting that she only receive Mylan Brand., Disp-90 tablet, R-1, E-Prescribe      calcium carbonate 500 mg, elemental, (OSCAL) 500 MG tablet Take 1 tablet by mouth daily., Historical      calcium carbonate-vitamin D (CALTRATE) 600-10 MG-MCG per tablet Take 2 tablets by mouth daily., Historical      carboxymethylcellulose PF (REFRESH PLUS) 0.5 % ophthalmic solution Place 1 drop into both eyes 4 times daily as needed for dry eyes Uncertain of otc product, Historical      cholecalciferol (VITAMIN D3) 25 mcg (1000 units) capsule Take 2 capsules by mouth daily., Historical      Coenzyme Q10 (COQ10 PO) TAKE 2 TEASPOONS OF QUNOL-LIQUID DAILY, R-0, Historical      cyanocobalamin (VITAMIN B-12) 500 MCG SUBL sublingual tablet Place 500 mcg under the tongue once a week. On , Historical      donepezil (ARICEPT) 5 MG tablet Take 5 mg by mouth At Bedtime, Historical      Lutein-Zeaxanthin 25-5 MG CAPS Take 1 capsule by mouth daily, Historical      melatonin 5 MG  tablet Take 5 mg by mouth nightly as needed for sleep., Historical      memantine (NAMENDA) 10 MG tablet Take 10 mg by mouth 2 times daily., Historical      multivitamin w/minerals (THERA-VIT-M) tablet Take 1 tablet by mouth daily, Historical      Omega-3 Fatty Acids (FISH OIL) 1200 MG capsule Take 1,200 mg by mouth daily, Historical      polyethylene glycol (MIRALAX) powder Take 17 g (1 capful) by mouth daily INDICATION: CONSTIPATION, TO ACHIEVE 1-2 SOFT BMs PER DAY, Disp-1581 g, R-2, E-Prescribe      potassium citrate (UROCIT-K) 10 MEQ (1080 MG) CR tablet Take 10 mEq by mouth 3 times daily (with meals), Historical      vitamin E (TOCOPHEROL) 1000 units (450 mg) CAPS capsule Take 1,000 Units by mouth daily., Historical      zinc gluconate 50 MG tablet Take 50 mg by mouth daily Unsure of which zinc formulation, Historical           STOP taking these medications       hydrochlorothiazide (HYDRODIURIL) 25 MG tablet Comments:   Reason for Stopping:             Allergies   Allergies   Allergen Reactions    Diatrizoate Hives and Itching    Dye [Contrast Dye] Hives and Itching    Iodine Hives    Seasonal Allergies

## 2024-12-18 NOTE — PROGRESS NOTES
Observation goals  PRIOR TO DISCHARGE       Comments: List all  goals to be met before discharge:  - Diagnostic tests and consults completed and resulted: MET  - No further episodes of syncope and any new arrhythmia addressed with controlled heart rates: MET  - Vital signs normal or at patient baseline and orthostatic vitals are normal and patient not lightheaded with standing: MET  - Tolerating oral intake to maintain hydration: MET  - Safe disposition plan has been identified: MET  - Nurse to notify provider when observation goals have been met and patient is ready for discharge.

## 2024-12-18 NOTE — PLAN OF CARE
12/18/2024 [id number]  Goal Outcome Evaluation:  PRIMARY Concern: Syncopal episode  SAFETY RISK Concerns (fall risk, behaviors, etc.): fall risk     Aggression Tool Color: green  Isolation/Type: special precautions, Covid +  Tests/Procedures for NEXT shift: orthostatics  Consults? (Pending/following, signed-off?) Cards signed off, PT, SW/CC following  Where is patient from? (Home, TCU, etc.): Home  Other Important info for NEXT shift: patient discharging to [facility]  Anticipated DC date & active delays: none  _____________________________________________________________________________  SUMMARY NOTE:            Orientation/Cognitive: A&Ox3-4; forgetful, neuros intact  Observation Goals (Met/ Not Met): MET  Mobility Level/Assist Equipment: SBA in   Antibiotics & Plan (IV/po, length of tx left): bolus IVF prior to discharge  Pain Management: denied pain  Tele/VS/O2: VSS on RA except HTN. Tele: Afib  ABNL Lab/BG: WNL  Diet: mod carb diet, appetite improved, adequate oral intake  Bowel/Bladder: Incontinent of urine  at times, cont of bowel; BM x1, ambulating in BR  Skin Concerns: Scattered bruising, laceration on back of scalp- 2 staples in place, dried drainage   Drains/Devices: PIV removed prior to discharge  Patient Stated Goal for Today: discharge           Observation goals  PRIOR TO DISCHARGE       Comments: List all  goals to be met before discharge:  - Diagnostic tests and consults completed and resulted MET  - No further episodes of syncope and any new arrhythmia addressed with controlled heart rates MET  - Vital signs normal or at patient baseline and orthostatic vitals are normal and patient not lightheaded with standing: MET  - Tolerating oral intake to maintain hydration MET  - Safe disposition plan has been identified MET  - Nurse to notify provider when observation goals have been met and patient is ready for discharge.

## 2024-12-18 NOTE — PLAN OF CARE
"Physical Therapy Discharge Summary    Reason for therapy discharge:    Discharged to transitional care facility.    Progress towards therapy goal(s). See goals on Care Plan in [facility] electronic health record for goal details.  Goals not met.  Barriers to achieving goals:   discharge from facility.    Therapy recommendation(s):    Continued therapy is recommended.  Rationale/Recommendations:  Per prior PT notes, \"Patient is mobilizing with mostly CGA/SBA in room with WW though does leave it at times. Reports she \"doesn't need it for home.\" Discussed that after falls and with Covid she may be weaker. Pt. adamant that she will not go to TCU. If  can give CGA/supervision level of support, feel patient can d/c to home with home PT to work on strengthening and use of FWW. If  cannot give this support or if pt. will be alone during the day a TCU should be considered.\".    Patient not seen by this therapist on this date, discharge summary written based on chart review and previous PT notes. Please see PT flowsheets for further details.        "

## 2024-12-18 NOTE — PLAN OF CARE
Shift: [id number]    Cognition/Mentation/Neuro: Aox4, forgetful at times  VS: VSS on RA  Cardiac: SR  GI/: Continent, up to commode to void. No BM this shift.   Pulmonary: LS diminished, frequent dry cough.   Pain: Denies   Drains/Lines: 2 SL PIV  Skin: Dry, scattered bruising    Activity: A1 GB/ walker  Diet: Mod carb  Discharge: Tomorrow ~1300 to [facility]    Shift summary: CT PE ordered. Premeds started around 0930 as patient has allergy to contrast. Plan to scan patient around 2130.     Observation goals  PRIOR TO DISCHARGE       Comments: List all  goals to be met before discharge:  - Diagnostic tests and consults completed and resulted- Not met  - No further episodes of syncope and any new arrhythmia addressed with controlled heart rates- Met  - Vital signs normal or at patient baseline and orthostatic vitals are normal and patient not lightheaded with standing- Met  - Tolerating oral intake to maintain hydration- Met  - Safe disposition plan has been identified- Met

## 2024-12-19 ENCOUNTER — TRANSITIONAL CARE UNIT VISIT (OUTPATIENT)
Dept: GERIATRICS | Facility: CLINIC | Age: 84
End: 2024-12-19
Payer: MEDICARE

## 2024-12-19 ENCOUNTER — PATIENT OUTREACH (OUTPATIENT)
Dept: CARE COORDINATION | Facility: CLINIC | Age: 84
End: 2024-12-19

## 2024-12-19 VITALS
WEIGHT: 103 LBS | BODY MASS INDEX: 18.95 KG/M2 | HEIGHT: 62 IN | RESPIRATION RATE: 16 BRPM | SYSTOLIC BLOOD PRESSURE: 156 MMHG | OXYGEN SATURATION: 96 % | TEMPERATURE: 98.1 F | HEART RATE: 71 BPM | DIASTOLIC BLOOD PRESSURE: 78 MMHG

## 2024-12-19 DIAGNOSIS — I10 PRIMARY HYPERTENSION: ICD-10-CM

## 2024-12-19 DIAGNOSIS — Z78.9 IMPAIRED MOBILITY AND ACTIVITIES OF DAILY LIVING: ICD-10-CM

## 2024-12-19 DIAGNOSIS — R53.81 PHYSICAL DECONDITIONING: ICD-10-CM

## 2024-12-19 DIAGNOSIS — F02.80 ALZHEIMERS DISEASE (H): ICD-10-CM

## 2024-12-19 DIAGNOSIS — U07.1 INFECTION DUE TO 2019 NOVEL CORONAVIRUS: ICD-10-CM

## 2024-12-19 DIAGNOSIS — R29.6 RECURRENT FALLS: Primary | ICD-10-CM

## 2024-12-19 DIAGNOSIS — Z74.09 IMPAIRED MOBILITY AND ACTIVITIES OF DAILY LIVING: ICD-10-CM

## 2024-12-19 DIAGNOSIS — R55 SYNCOPE AND COLLAPSE: ICD-10-CM

## 2024-12-19 DIAGNOSIS — D69.6 THROMBOCYTOPENIA (H): ICD-10-CM

## 2024-12-19 DIAGNOSIS — G30.9 ALZHEIMERS DISEASE (H): ICD-10-CM

## 2024-12-19 DIAGNOSIS — I48.91 ATRIAL FIBRILLATION, UNSPECIFIED TYPE (H): ICD-10-CM

## 2024-12-19 DIAGNOSIS — E87.6 HYPOKALEMIA: ICD-10-CM

## 2024-12-19 PROCEDURE — 86481 TB AG RESPONSE T-CELL SUSP: CPT | Mod: ORL | Performed by: PHYSICIAN ASSISTANT

## 2024-12-19 NOTE — PROGRESS NOTES
[facility] GERIATRICS    PRIMARY CARE PROVIDER AND CLINIC:  Alison Hernandez, , [address] / [place] [address]  Chief Complaint   Patient presents with    Hospital F/U      [facility] Medical Record Number:  [id number]  Place of Service where encounter took place:  [facility] (U) [[id number]]      HPI:    85yo female admitted at [facility] from 12/13 - 12/18, 2024 after presenting from home with recurrent falls and syncope. She was found to have COVID-19 infection, orthostatic hypotension, as well as new onset afib. Heart rates were controlled, TTE was unremakrable. Seen in consult with cardiology and not initiated on medications dt controlled HR, not started on AC due to falls. Pt was not hypoxic but extremely weak felt to be related to COVID and orthostasis. Received IV hydration with improvement in blood pressures, bp meds stopped. Following therapy evaluations she was referred to TCU for ongoing rehab, med management.    Pt is seen as initial visit. She is resting in bed on interview, states she was trying to sleep and needs her rest as she is in the hospital. She was reminded she is in TCU and not the hospital, states it all feels the same. She denies cp, sob. States she ate breakfast without issue. Denies acute concerns at present. Prior to hospitalization lives with  in EP. Full code.     CODE STATUS/ADVANCE DIRECTIVES DISCUSSION:  Full Code  CPR/Full code   ALLERGIES:   Allergies   Allergen Reactions    Diatrizoate Hives and Itching    Dye [Contrast Dye] Hives and Itching    Iodine Hives    Seasonal Allergies       PAST MEDICAL HISTORY:   Past Medical History:   Diagnosis Date    Benign essential hypertension     Female stress incontinence     Herpes simplex without mention of complication     Hyperlipidemia     Insomnia     Lumbago 08/17/2006    MCI (mild cognitive impairment)     Meniere's disease     Osteoporosis     Other ovarian failure     Personal history of other  disorders of nervous system and sense organs     Sensorineural hearing loss, unilateral       PAST SURGICAL HISTORY:   has a past surgical history that includes APPENDECTOMY; NONSPECIFIC PROCEDURE (33 yrs old); NONSPECIFIC PROCEDURE; hysterectomy, vaginal; NONSPECIFIC PROCEDURE; and breast biopsy, rt/lt.  FAMILY HISTORY: family history includes Blood Disease in her maternal grandfather; C.A.D. in her brother, father, and mother; Cancer in her mother; Cerebrovascular Disease in her maternal grandmother; Connective Tissue Disorder in her daughter and daughter; Diabetes in her maternal aunt and maternal aunt; Neurologic Disorder in her father; Obesity in her brother; Prostate Cancer in her father.  SOCIAL HISTORY:   reports that she quit smoking about 64 years ago. Her smoking use included cigarettes. She started smoking about 77 years ago. She has a 39 pack-year smoking history. She has never used smokeless tobacco. She reports that she does not drink alcohol and does not use drugs.  Patient's living condition: lives with spouse    Post Discharge Medication Reconciliation Status:   MED REC REQUIRED  Post Medication Reconciliation Status: discharge medications reconciled, continue medications without change       Current Outpatient Medications   Medication Sig Dispense Refill    acetaminophen (TYLENOL) 325 MG tablet Take 2 tablets (650 mg) by mouth every 4 hours as needed for mild pain.      acyclovir (ZOVIRAX) 400 MG tablet Take 400 mg by mouth as needed (cold sores).      aspirin 81 MG EC tablet Take 81 mg by mouth at bedtime.      atorvastatin (LIPITOR) 10 MG tablet Take 1 tablet (10 mg) by mouth daily Pt is requesting that she only receive Mylan Brand. 90 tablet 1    calcium carbonate-vitamin D (CALTRATE) 600-10 MG-MCG per tablet Take 2 tablets by mouth daily.      carboxymethylcellulose PF (REFRESH PLUS) 0.5 % ophthalmic solution Place 1 drop into both eyes 4 times daily as needed for dry eyes Uncertain of otc  "product      cholecalciferol (VITAMIN D3) 25 mcg (1000 units) capsule Take 2 capsules by mouth daily.      Coenzyme Q10 (COQ10 PO) TAKE 2 TEASPOONS OF QUNOL-LIQUID DAILY  0    cyanocobalamin (VITAMIN B-12) 500 MCG SUBL sublingual tablet Place 500 mcg under the tongue once a week. On Mondays      donepezil (ARICEPT) 5 MG tablet Take 5 mg by mouth At Bedtime      Lutein-Zeaxanthin 25-5 MG CAPS Take 1 capsule by mouth daily      melatonin 5 MG tablet Take 5 mg by mouth nightly as needed for sleep.      memantine (NAMENDA) 10 MG tablet Take 10 mg by mouth 2 times daily.      multivitamin w/minerals (THERA-VIT-M) tablet Take 1 tablet by mouth daily      Omega-3 Fatty Acids (FISH OIL) 1200 MG capsule Take 1,200 mg by mouth daily      polyethylene glycol (MIRALAX) powder Take 17 g (1 capful) by mouth daily INDICATION: CONSTIPATION, TO ACHIEVE 1-2 SOFT BMs PER DAY 1581 g 2    potassium citrate (UROCIT-K) 10 MEQ (1080 MG) CR tablet Take 10 mEq by mouth 3 times daily (with meals)      vitamin E (TOCOPHEROL) 1000 units (450 mg) CAPS capsule Take 1,000 Units by mouth daily.      zinc gluconate 50 MG tablet Take 50 mg by mouth daily Unsure of which zinc formulation      Ascorbic Acid (VITAMIN C PO) Take 1 tablet by mouth daily as needed (when feeling under the weather). (Patient not taking: Reported on 12/19/2024)       No current facility-administered medications for this visit.       ROS:  4 point ROS including Respiratory, CV, GI and , other than that noted in the HPI,  is negative    Vitals:  BP (!) 156/78   Pulse 71   Temp 98.1  F (36.7  C)   Resp 16   Ht 1.575 m (5' 2\")   Wt 46.7 kg (103 lb)   SpO2 96%   BMI 18.84 kg/m    Exam:  GEN: well-developed, well-nourished, appears comfortable  HEENT: NCAT, EOM intact bilaterally, nose & mouth patent, mucous membranes moist  CHEST: lungs CTA bilaterally, no increased work of breathing, no wheeze, crackles, rhonchi  HEART: irregularly irregular, controlled rate, S1 & " S2  ABD: soft, nontender, nondistended, no guarding or rigidity  MSK: AROM bilateral UE/LE  NEURO: awake, alert. Follows commands. CN II-XII grossly intact. Sensation grossly intact to light touch.   SKIN: warm & dry without rash, no pedal edema    Lab/Diagnostic data:  Recent labs in [facility] reviewed by me today.  and Most Recent 3 CBC's:  Recent Labs   Lab Test 12/18/24  0739 12/15/24  0709 12/14/24  0723   WBC 6.0 3.8* 4.8   HGB 11.7 12.1 11.5*   MCV 92 93 95    132* 127*     Most Recent 3 BMP's:  Recent Labs   Lab Test 12/18/24  0739 12/17/24  0719 12/16/24  0746 12/15/24  0709    141  --  138   POTASSIUM 3.9 3.6  3.6 3.5 3.5   CHLORIDE 106 105  --  104   CO2 26 25  --  26   BUN 16.2 13.2  --  16.0   CR 0.54 0.54  --  0.47*   ANIONGAP 7 11  --  8   FLAKO 9.1 8.7*  --  8.3*   * 92  --  91     Most Recent 2 LFT's:  Recent Labs   Lab Test 12/13/24  1224 12/11/24  2109   AST 28 33   ALT 20 29   ALKPHOS 46 65   BILITOTAL 0.4 0.7     Most Recent 3 Troponin's:  Recent Labs   Lab Test 06/28/21  0659 06/28/21  0300 06/27/21  2243   TROPI <0.015 <0.015 <0.015     7-Day Micro Results       Collected Updated Procedure Result Status      12/19/2024 0950 12/19/2024 1129 Quantiferon TB Gold Plus Grey Tube [[id number]]   Peripheral Blood    In process Component Value   No component results            12/19/2024 0950 12/19/2024 1129 Quantiferon TB Gold Plus Green Tube [[id number]]   Peripheral Blood    In process Component Value   No component results            12/19/2024 0950 12/19/2024 1129 Quantiferon TB Gold Plus Yellow Tube [[id number]]   Peripheral Blood    In process Component Value   No component results            12/19/2024 0950 12/19/2024 1129 Quantiferon TB Gold Plus Purple Tube [[id number]]   Peripheral Blood    In process Component Value   No component results            12/13/2024 1228 12/13/2024 1321 Influenza A/B, RSV and SARS-CoV2 PCR (COVID-19) Nasopharyngeal [[id number]]     (Abnormal)   Swab from Nasopharyngeal    Final result Component Value   Influenza A PCR Negative   Influenza B PCR Negative   RSV PCR Negative   SARS CoV2 PCR Positive   POSITIVE: SARS-CoV-2 (COVID-19) RNA detected, presumed positive.                  Most Recent TSH and T4:  Recent Labs   Lab Test 12/13/24  1224 01/04/20  0938 08/08/17  0857   TSH 0.84   < > 2.33   T4  --   --  0.97    < > = values in this interval not displayed.     Most Recent Hemoglobin A1c:No lab results found.  Most Recent Urinalysis:  Recent Labs   Lab Test 01/04/20  1047   COLOR Yellow   APPEARANCE Clear   URINEGLC Negative   URINEBILI Negative   URINEKETONE 5*   SG 1.033   UBLD Negative   URINEPH 7.0   PROTEIN 10*   NITRITE Negative   LEUKEST Negative   RBCU 6*   WBCU <1     Most Recent ESR & CRP:  Recent Labs   Lab Test 12/17/24  0719   CRPI 6.83*       ASSESSMENT/PLAN:    Recurrent falls  Syncope and collapse  Orthostatic hypotension  Generalized weakness  Physical deconditioning  Impaired mobility and ADLs  Presented with recurrent falls, multifactorial. TTE with LVEF 60-65%, no WMAs, no valvular dz. CTA PE neg. Chlorthalidone discontinued.  -Encourage PO intake  -Orthostatics qAM  -PT/OT evaluations  -SW for discharge planning    Afib/flutter   Controlled rate. [id number] 4. Seen in consult with cardiology, AC deferred 2/2 falls. Not started on rate control meds.  -Monitor HR  -Follow-up with cardiology as outpatient    Acute COVID-19 infection  Not hypoxic, apart from weakness asymptomatic.  -Supportive management  -Encourage ambulation    Hypokalemia, resolved  Replaced per protocol.  -BMP 12/23    Thrombocytopenia  Plt 123-132, likely reactive.  -CBC 12/23    HTN/HLD  Chronic, stable. Hydrochlorothiazide discontinued in setting of orthostatic hypotension, hypokalemia. Pt is insisting on specific brand of statin not available from pharmacy.  -Monitor BP trend  -Holding statin while at TCU    Cognitive impairment  Follows with  neurology, Dr. Fish.  -Continues on donepezil, namenda, seroquel  -Supportive management  -Maintain sleep/wake cycle  -OT for cog eval    Total time spent with patient visit at the skilled nursing facility was 45 min including patient visit and review of past records.     Electronically signed by:  Kosta Kumar PA-C

## 2024-12-19 NOTE — PROGRESS NOTES
[facility]    Background: Transitional Care Management program identified per system criteria and reviewed by Connected [facility] Resource Center team for possible outreach.    Assessment: Upon chart review, CCRC Team member will not proceed with patient outreach related to this episode of Transitional Care Management program due to reason below:    Non-[facility] TCU: CCRC team member noted patient discharged to TCU/ARU/LTACH. Patient is not established with a [facility] currently supported by Primary Care-Care Coordination therefore handoff to Primary Care-Care Coordination is not appropriate at this time.    Plan: Transitional Care Management episode addressed appropriately per reason noted above.      SONA Duque  Connected Care Resource [facility]    *Connected Care Resource Team does NOT follow patient ongoing. Referrals are identified based on internal discharge reports and the outreach is to ensure patient has an understanding of their discharge instructions.

## 2024-12-22 ENCOUNTER — LAB REQUISITION (OUTPATIENT)
Dept: LAB | Facility: CLINIC | Age: 84
End: 2024-12-22

## 2024-12-22 DIAGNOSIS — Z00.01 ENCOUNTER FOR GENERAL ADULT MEDICAL EXAMINATION WITH ABNORMAL FINDINGS: ICD-10-CM

## 2024-12-23 ENCOUNTER — TRANSITIONAL CARE UNIT VISIT (OUTPATIENT)
Dept: GERIATRICS | Facility: CLINIC | Age: 84
End: 2024-12-23
Payer: MEDICARE

## 2024-12-23 VITALS
OXYGEN SATURATION: 95 % | TEMPERATURE: 98 F | DIASTOLIC BLOOD PRESSURE: 79 MMHG | RESPIRATION RATE: 18 BRPM | SYSTOLIC BLOOD PRESSURE: 145 MMHG | HEART RATE: 70 BPM

## 2024-12-23 LAB
ANION GAP SERPL CALCULATED.3IONS-SCNC: 8 MMOL/L (ref 7–15)
BUN SERPL-MCNC: 19.9 MG/DL (ref 8–23)
CALCIUM SERPL-MCNC: 9.4 MG/DL (ref 8.8–10.4)
CHLORIDE SERPL-SCNC: 102 MMOL/L (ref 98–107)
CREAT SERPL-MCNC: 0.64 MG/DL (ref 0.51–0.95)
EGFRCR SERPLBLD CKD-EPI 2021: 86 ML/MIN/1.73M2
ERYTHROCYTE [DISTWIDTH] IN BLOOD BY AUTOMATED COUNT: 13.5 % (ref 10–15)
GLUCOSE SERPL-MCNC: 93 MG/DL (ref 70–99)
HCO3 SERPL-SCNC: 29 MMOL/L (ref 22–29)
HCT VFR BLD AUTO: 39.4 % (ref 35–47)
HGB BLD-MCNC: 13 G/DL (ref 11.7–15.7)
MCH RBC QN AUTO: 31 PG (ref 26.5–33)
MCHC RBC AUTO-ENTMCNC: 33 G/DL (ref 31.5–36.5)
MCV RBC AUTO: 94 FL (ref 78–100)
PLATELET # BLD AUTO: 280 10E3/UL (ref 150–450)
POTASSIUM SERPL-SCNC: 3.7 MMOL/L (ref 3.4–5.3)
RBC # BLD AUTO: 4.2 10E6/UL (ref 3.8–5.2)
SODIUM SERPL-SCNC: 139 MMOL/L (ref 135–145)
WBC # BLD AUTO: 5.8 10E3/UL (ref 4–11)

## 2024-12-23 PROCEDURE — 36415 COLL VENOUS BLD VENIPUNCTURE: CPT | Performed by: PHYSICIAN ASSISTANT

## 2024-12-23 PROCEDURE — 80048 BASIC METABOLIC PNL TOTAL CA: CPT | Performed by: PHYSICIAN ASSISTANT

## 2024-12-23 PROCEDURE — P9604 ONE-WAY ALLOW PRORATED TRIP: HCPCS | Performed by: PHYSICIAN ASSISTANT

## 2024-12-23 PROCEDURE — 85027 COMPLETE CBC AUTOMATED: CPT | Performed by: PHYSICIAN ASSISTANT

## 2024-12-23 NOTE — PROGRESS NOTES
[facility] GERIATRICS    Chief Complaint   Patient presents with    RECHECK     HPI:  Carmen Zhang is a 84 year old  (1940), who is being seen today for an episodic care visit at: [facility] ([facility]) [[id number]].     Summary: ***    Today, pt is seen in follow-up. ***    On review of facility records, BPs ranging 120-152, HRs 69-80.    Allergies, and PMH/PSH reviewed in EPIC today.  REVIEW OF SYSTEMS:  4 point ROS including Respiratory, CV, GI and , other than that noted in the HPI,  is negative    Objective:   BP (!) 145/79   Pulse 70   Temp 98  F (36.7  C)   Resp 18   SpO2 95%   ***    {fgslab:237252}    Assessment/Plan:    ***    MED REC REQUIRED{TIP  Click the link below to document or use med rec list, use list to pull in response :[id number]}  Post Medication Reconciliation Status: {MED REC LIST:805870}      Electronically signed by: Kosta Kumar PA-C

## 2024-12-26 ENCOUNTER — TRANSITIONAL CARE UNIT VISIT (OUTPATIENT)
Dept: GERIATRICS | Facility: CLINIC | Age: 84
End: 2024-12-26
Payer: MEDICARE

## 2024-12-26 VITALS
TEMPERATURE: 97.8 F | HEIGHT: 62 IN | DIASTOLIC BLOOD PRESSURE: 79 MMHG | OXYGEN SATURATION: 97 % | HEART RATE: 80 BPM | BODY MASS INDEX: 18.95 KG/M2 | RESPIRATION RATE: 18 BRPM | WEIGHT: 103 LBS | SYSTOLIC BLOOD PRESSURE: 153 MMHG

## 2024-12-26 DIAGNOSIS — I10 PRIMARY HYPERTENSION: ICD-10-CM

## 2024-12-26 DIAGNOSIS — R53.81 PHYSICAL DECONDITIONING: ICD-10-CM

## 2024-12-26 DIAGNOSIS — U07.1 INFECTION DUE TO 2019 NOVEL CORONAVIRUS: ICD-10-CM

## 2024-12-26 DIAGNOSIS — F02.80 ALZHEIMERS DISEASE (H): ICD-10-CM

## 2024-12-26 DIAGNOSIS — G30.9 ALZHEIMERS DISEASE (H): ICD-10-CM

## 2024-12-26 DIAGNOSIS — R55 SYNCOPE AND COLLAPSE: ICD-10-CM

## 2024-12-26 DIAGNOSIS — I48.91 ATRIAL FIBRILLATION, UNSPECIFIED TYPE (H): ICD-10-CM

## 2024-12-26 DIAGNOSIS — R29.6 RECURRENT FALLS: Primary | ICD-10-CM

## 2024-12-26 NOTE — PROGRESS NOTES
[facility] GERIATRICS  INITIAL VISIT NOTE  December 26, 2024      PRIMARY CARE PROVIDER AND CLINIC:  Alison Hernandez [address] / [place] [address]    [facility] Medical Record Number:  [id number]  Place of Service where encounter took place:  [facility] ([facility]) [[id number]]    Chief Complaint   Patient presents with    Hospital F/U       HPI:    Carmen Zhang is a 84 year old  (1940) female seen today at [facility]. Medical history is notable for HTN (?orthostatic hypotension), thoracic mass s/p resection, HLD and dementia. She was hospitalized at [facility] from 12/13/24 to 12/18/24 where she presented after a fall and syncope. This was her second fall in a week. In the ER, COVID test was positive and VS notable for orthostatic hypotension and she had a posterior head lac. PTA hydrochlorothiazide was discontinued, she received IV fluids and was monitored on telemetry which revealed a new diagnosis of a-fib/flutter. TTE with normal EF and no significant valve disease. She was admitted to this facility for  rehab, medical management, and nursing care.      History obtained from: facility chart records, facility staff, patient report, [facility] chart review, and Care Everywhere [facility] chart review.      Today, Ms Zhang is seen in her room sitting on the side of the bed. She is a limited historian due to dementia. She is going home tomorrow - I asked her if she is happy about this and she said that when a woman goes home she is expected to work, so she is not sure. No chest pain. No trouble breathing. No belly pain. Does not feel constipated or have loose stools. No acute concerns today per discussion with nursing. She is working with therapies.     CODE STATUS: CPR/Full code     ALLERGIES:  Allergies   Allergen Reactions    Diatrizoate Hives and Itching    Dye [Contrast Dye] Hives and Itching    Iodine Hives    Seasonal Allergies        PAST MEDICAL HISTORY:    Past Medical History:   Diagnosis Date    Benign essential hypertension     Female stress incontinence     Herpes simplex without mention of complication     Hyperlipidemia     Insomnia     Lumbago 08/17/2006    MCI (mild cognitive impairment)     Meniere's disease     Osteoporosis     Other ovarian failure     Personal history of other disorders of nervous system and sense organs     Sensorineural hearing loss, unilateral        PAST SURGICAL HISTORY:   Past Surgical History:   Procedure Laterality Date    BREAST BIOPSY, RT/LT      HYSTERECTOMY, VAGINAL      ovaries left    ZZC APPENDECTOMY      Z NONSPECIFIC PROCEDURE  33 yrs old    removal of growth on heart     Z NONSPECIFIC PROCEDURE      bladder suspension    [facility] NONSPECIFIC PROCEDURE      vein stripping       FAMILY HISTORY:   Family History   Problem Relation Age of Onset    Cancer Mother         ESOPHAGEAL, SMOKER    CARLOACARRIE Mother         MI IN HER 70s    MINH Father         MI IN HIS 70s    Prostate Cancer Father     Neurologic Disorder Father         PARKINSON'S    Cerebrovascular Disease Maternal Grandmother     Blood Disease Maternal Grandfather     Diabetes Maternal Aunt     MINH Brother         11 STENTS AND BYPASS, IN HIS FIFTIES    Obesity Brother     Connective Tissue Disorder Daughter         HIP PRESENTATION    Diabetes Maternal Aunt     Connective Tissue Disorder Daughter         ARTHRITIS       SOCIAL HISTORY:   Patient's living condition: lives with spouse    MEDICATIONS:  Post Discharge Medication Reconciliation Status: discharge medications reconciled and changed, per note/orders.  Current Outpatient Medications   Medication Sig Dispense Refill    acetaminophen (TYLENOL) 325 MG tablet Take 2 tablets (650 mg) by mouth every 4 hours as needed for mild pain.      acyclovir (ZOVIRAX) 400 MG tablet Take 400 mg by mouth as needed (cold sores).      Ascorbic Acid (VITAMIN C PO) Take 1 tablet by mouth daily as needed (when feeling under  "the weather).      aspirin 81 MG EC tablet Take 81 mg by mouth at bedtime.      atorvastatin (LIPITOR) 10 MG tablet Take 1 tablet (10 mg) by mouth daily Pt is requesting that she only receive Mylan Brand. 90 tablet 1    calcium carbonate-vitamin D (CALTRATE) 600-10 MG-MCG per tablet Take 2 tablets by mouth daily.      carboxymethylcellulose PF (REFRESH PLUS) 0.5 % ophthalmic solution Place 1 drop into both eyes 4 times daily as needed for dry eyes Uncertain of otc product      cholecalciferol (VITAMIN D3) 25 mcg (1000 units) capsule Take 2 capsules by mouth daily.      cyanocobalamin (VITAMIN B-12) 500 MCG SUBL sublingual tablet Place 500 mcg under the tongue once a week. On Mondays      donepezil (ARICEPT) 5 MG tablet Take 5 mg by mouth At Bedtime      Lutein-Zeaxanthin 25-5 MG CAPS Take 1 capsule by mouth daily      melatonin 5 MG tablet Take 5 mg by mouth nightly as needed for sleep.      memantine (NAMENDA) 10 MG tablet Take 10 mg by mouth 2 times daily.      multivitamin w/minerals (THERA-VIT-M) tablet Take 1 tablet by mouth daily      Omega-3 Fatty Acids (FISH OIL) 1200 MG capsule Take 1,200 mg by mouth daily      polyethylene glycol (MIRALAX) powder Take 17 g (1 capful) by mouth daily INDICATION: CONSTIPATION, TO ACHIEVE 1-2 SOFT BMs PER DAY 1581 g 2    potassium citrate (UROCIT-K) 10 MEQ (1080 MG) CR tablet Take 10 mEq by mouth 3 times daily (with meals)      vitamin E (TOCOPHEROL) 1000 units (450 mg) CAPS capsule Take 1,000 Units by mouth daily.      zinc gluconate 50 MG tablet Take 50 mg by mouth daily Unsure of which zinc formulation         ROS:  Unable to obtain due to cognitive impairment or aphasia    PHYSICAL EXAM:  BP (!) 153/79   Pulse 80   Temp 97.8  F (36.6  C)   Resp 18   Ht 1.575 m (5' 2\")   Wt 46.7 kg (103 lb)   SpO2 97%   BMI 18.84 kg/m    Gen: sitting on side of the bed, alert, cooperative and in no acute distress  Resp: lungs clear to auscultation bilaterally, no crackles or wheezes; " moving good air  Ext: no LE edema  Neuro: CX II-XII grossly in tact; ROM in all four extremities grossly in tact  Psych: alert and oriented to self and general situation     LABORATORY/IMAGING DATA:  Reviewed as per [facility] and/or [facility]    ASSESSMENT/PLAN:    Atrial Fibrillation / Atrial Flutter - NEW Diagnosis  Seen on telemetry. RRR today. HR 70s-80s. Not on anticoag given risk > benefit (falls)  -- ZIO and cards follow up   -- follow up with cardiology as schedule d    HTN  Hx Orthostatic Hypotension  Recent Syncope  SBPs 140s. HR 70s-80s. Weight - non on file. PTA hydrochlorothiazide discontinued during hospitalization. No LE edema.   -- follow BPs; no indication for adding back meds at this time    Dementia Without Behavioral Disturbance  BIMS 13/15. Lives with spouse  -- donepezil 5 mg at bedtime, memantine 10 mg BID   -- OT following for cog eval    COVID 19 Infection, Recovered (12/13/24)  Lungs clear  -- should get updated booster in 3 mos    HLD  -- atorvastatin 10 mg daily    Slow Transit Constipation  -- Miralax 17g daily  -- adjust bowel regimen as needed    Physical Deconditioning  In setting of hospitalization and underlying medical conditions  -- ongoing PT/OT      Electronically signed by:  Diana Black MD         Documentation of Face-to-Face and Certification for Home Health Services     Patient: Carmen Zhang   YOB: 1940  MR Number: [id number]  Today's Date: 12/26/2024    I certify that patient: Carmen Zhang is under my care and that I, or a nurse practitioner or physician's assistant working with me, had a face-to-face encounter that meets the physician face-to-face encounter requirements with this patient on: December 26, 2024    This encounter with the patient was in whole, or in part, for the following medical condition, which is the primary reason for home health care: syncope, orthostatic hypotension, dementia, physical deconditioning.    I certify that,  based on my findings, the following services are medically necessary home health services: Nursing, Occupational Therapy, Physical Therapy, and HHA .    My clinical findings support the need for the above services because: Nurse is needed: med set up and education., Occupational Therapy Services are needed to assess and treat cognitive ability and address ADL safety due to impairment in ongoing strength, balance, endurance and ADLs., and Physical Therapy Services are needed to assess and treat the following functional impairments: ongoing strength, balance, endurance and ADLs.    Further, I certify that my clinical findings support that this patient is homebound (i.e. absences from home require considerable and taxing effort and are for medical reasons or [employer]hovah's witness services or infrequently or of short duration when for other reasons) because: unable to leave home without assistance.    Based on the above findings. I certify that this patient is confined to the home and needs intermittent skilled nursing care, physical therapy and/or speech therapy.  The patient is under my care, and I have initiated the establishment of the plan of care.  This patient will be followed by a physician who will periodically review the plan of care.  Physician/Provider to provide follow up care: Alison Hernandez    Responsible Medicare certified PECOS Physician: Diana Black MD  Electronically signed by: Diana Black MD    Date: 12/26/2024

## 2025-01-07 ENCOUNTER — TELEPHONE (OUTPATIENT)
Dept: CARDIOLOGY | Facility: CLINIC | Age: 85
End: 2025-01-07
Payer: MEDICARE

## 2025-01-07 NOTE — TELEPHONE ENCOUNTER
Pt  Sukhdeep called and states that pt still wearing her ZP at this time and will not be done on 1/13.  Visit is scheduled with Lo on 1/10 and will not have results available Pt will wear ZP as planned to 1/13 and then will have pt follow with with Lo on 1/23 at 3:20. Sebastian

## 2025-01-22 LAB — CV ZIO PRELIM RESULTS: NORMAL

## 2025-02-28 ENCOUNTER — HOSPITAL ENCOUNTER (EMERGENCY)
Facility: CLINIC | Age: 85
Discharge: HOME | End: 2025-02-28
Attending: EMERGENCY MEDICINE | Admitting: EMERGENCY MEDICINE
Payer: MEDICARE

## 2025-02-28 VITALS
RESPIRATION RATE: 16 BRPM | TEMPERATURE: 98 F | OXYGEN SATURATION: 98 % | WEIGHT: 97.6 LBS | HEART RATE: 68 BPM | BODY MASS INDEX: 17.85 KG/M2 | SYSTOLIC BLOOD PRESSURE: 149 MMHG | DIASTOLIC BLOOD PRESSURE: 84 MMHG

## 2025-02-28 DIAGNOSIS — S81.801A LEG WOUND, RIGHT, INITIAL ENCOUNTER: ICD-10-CM

## 2025-02-28 PROCEDURE — 99283 EMERGENCY DEPT VISIT LOW MDM: CPT

## 2025-02-28 RX ORDER — CEPHALEXIN 500 MG/1
500 CAPSULE ORAL 4 TIMES DAILY
Qty: 28 CAPSULE | Refills: 0 | Status: SHIPPED | OUTPATIENT
Start: 2025-02-28 | End: 2025-03-07

## 2025-02-28 ASSESSMENT — ACTIVITIES OF DAILY LIVING (ADL)
ADLS_ACUITY_SCORE: 59
ADLS_ACUITY_SCORE: 59

## 2025-02-28 NOTE — ED PROVIDER NOTES
Emergency Department Note      History of Present Illness     Chief Complaint   Wound Check      HPI   History is augmented with the patient's niece  Carmen Zhang is a 84 year old female with a history of dementia, hypertension who presents for evaluation of right lower leg wound. She sustained the wound after scrapping her right shin on the chair after slipping from the chair 4 days ago. She reports abrasion to the area. She was brought in here by niece who is also a power of  and is concerned for infection to the area.  The patient's niece said she was spraying a counter antiseptic on the leg.    Independent Historian   Niece who is a POA as detailed above.    Review of External Notes   I reviewed the neurology note from 2/17/2025 patient was seen for memory loss.  On Aricept and Namenda.  Worsening memory loss over the last 5 years.  More irritation and aggression.    Past Medical History     Medical History and Problem List   Female stress incontinence      Herpes simplex  Lumbago           Meniere's disease         Ovarian failure   Nervous system and sense organs disorder  Hyperlipidemia  Osteoporosis  Constipation  Atrophic vaginitis  Hypertension   Vitamin B12 deficiency   Insomnia  Asymptomatic postmenopausal status  Memory problem  Vitamin D deficiency  Memory loss or impairment  Benzodiazepine dependence  Right shoulder tendonitis  Biceps tendinitis of right upper extremity    Medications   Aspirin 81 mg  Lipitor  Keflex  CoQ 10  Valium  Hydrodiuril  Lidocare  Melatonin  Miralax  Desyrel  Urocit-K   Tocopherol  Zinc gluconate  Lutein-zeaxanthin   Citracal  Hydrochlorothiazide  Medrol Moses  Betahistine HCl  Robaxin  Aricept    Surgical History   Breast biopsy, left  Inner ear surgery for Meniere's disease  ERNST and BSO  Appendectomy  Heart growth removal  Bladder suspension  Bladder repair  pericardial window lump removal  Vein stripping   Physical Exam     Patient Vitals for the past 24  hrs:   BP Temp Temp src Pulse Resp SpO2 Weight   02/28/25 1255 -- -- -- -- -- -- 44.3 kg (97 lb 9.6 oz)   02/28/25 1251 (!) 149/84 98  F (36.7  C) Oral -- -- -- --   02/28/25 1250 -- -- -- 68 16 98 % --     Physical Exam    Physical Exam   Constitutional:  Patient is oriented to person, place, and time. They appear well-developed and well-nourished. Mild distress secondary to leg pain.   HENT:   Eyes:    Conjunctivae normal and EOM are normal. Pupils are equal, round, and reactive to light.   Neck:    Normal range of motion.   Musculoskeletal:  Normal range of motion. Patient exhibits no edema.   Neurological:   Patient is alert and oriented to person, place, and time. Patient has normal strength. No cranial nerve deficit or sensory deficit. GCS 15  Skin:   Patient has an eating skin tear on the right anterior shin.  It is scabbed over.  There is some dried blood under the edge of the wound.  No fluctuance or purulence there is a small surrounding area of erythema.  It is tender to touch.  No necrosis.  Psychiatric:   Patient has a normal mood and affect. Patient's behavior is normal. Judgment and thought content normal.      Diagnostics     Lab Results   Labs Ordered and Resulted from Time of ED Arrival to Time of ED Departure - No data to display    Imaging   No orders to display       Independent Interpretation   None    ED Course      Medications Administered   Medications - No data to display    Procedures   Procedures     Discussion of Management   None    ED Course        Additional Documentation  None    Medical Decision Making / Diagnosis     CMS Diagnoses: None    MIPS       None    [facility]   Carmen Zhang is a 84 year old female who was brought in by her niece when she noted the leg wound today that occurred 4 days ago.  This looks like it was a skin tear now there is some mild erythema around the area.There is no evidence of abscess there is no streaking there is no evidence of necrosis to the wound but  there is some dried blood underneath the skin tear.  I advised the patient to stop putting the household disinfectant spray on her leg recommended bacitracin twice a day keeping the wound covered with a gauze dressing we will place her on Keflex.  She will follow close with her primary care doctor modified activity was also recommended to help this heal.    Disposition   The patient was discharged.     Diagnosis     ICD-10-CM    1. Leg wound, right, initial encounter  S81.801A            Discharge Medications   Discharge Medication List as of 2/28/2025  2:47 PM        START taking these medications    Details   cephALEXin (KEFLEX) 500 MG capsule Take 1 capsule (500 mg) by mouth 4 times daily for 7 days., Disp-28 capsule, R-0, E-Prescribe           Scribe Disclosure:  I, Emanuel Andres, am serving as a scribe at 2:28 PM on 2/28/2025 to document services personally performed by Oliva Cotton MD based on my observations and the provider's statements to me.        Oliva Cotton MD  02/28/25 [id number]

## 2025-02-28 NOTE — ED TRIAGE NOTES
Brought in to triage by her medical POA (niece). Patient has a wound to the right lower leg, reported to have been going on for about 4 days. Afebrile.     Triage Assessment (Adult)       Row Name 02/28/25 1250          Triage Assessment    Airway WDL WDL        Respiratory WDL    Respiratory WDL WDL        Skin Circulation/Temperature WDL    Skin Circulation/Temperature WDL X  Patient has a wound to the right lower leg, reported to have been going on for about 4 days.        Cardiac WDL    Cardiac WDL WDL        Peripheral/Neurovascular WDL    Peripheral Neurovascular WDL WDL        Cognitive/Neuro/Behavioral WDL    Cognitive/Neuro/Behavioral WDL WDL

## 2025-02-28 NOTE — DISCHARGE INSTRUCTIONS
Bacitracin antibiotic ointment topically twice a day and keep this wound covered with a gauze bandage.  Follow-up with your primary care doctor in 1 week to assure appropriate healing

## 2025-03-26 ENCOUNTER — MEDICAL CORRESPONDENCE (OUTPATIENT)
Dept: HEALTH INFORMATION MANAGEMENT | Facility: CLINIC | Age: 85
End: 2025-03-26
Payer: MEDICARE

## 2025-03-31 ENCOUNTER — LAB REQUISITION (OUTPATIENT)
Dept: LAB | Facility: CLINIC | Age: 85
End: 2025-03-31
Payer: MEDICARE

## 2025-03-31 DIAGNOSIS — E83.42 HYPOMAGNESEMIA: ICD-10-CM

## 2025-03-31 DIAGNOSIS — R73.9 HYPERGLYCEMIA, UNSPECIFIED: ICD-10-CM

## 2025-03-31 DIAGNOSIS — E87.6 HYPOKALEMIA: ICD-10-CM

## 2025-04-03 ENCOUNTER — MEDICAL CORRESPONDENCE (OUTPATIENT)
Dept: HEALTH INFORMATION MANAGEMENT | Facility: CLINIC | Age: 85
End: 2025-04-03
Payer: MEDICARE

## 2025-04-03 LAB
ALBUMIN SERPL BCG-MCNC: 3.8 G/DL (ref 3.5–5.2)
ALP SERPL-CCNC: 56 U/L (ref 40–150)
ALT SERPL W P-5'-P-CCNC: 24 U/L (ref 0–50)
ANION GAP SERPL CALCULATED.3IONS-SCNC: 7 MMOL/L (ref 7–15)
AST SERPL W P-5'-P-CCNC: 29 U/L (ref 0–45)
BILIRUB SERPL-MCNC: 0.5 MG/DL
BUN SERPL-MCNC: 28.6 MG/DL (ref 8–23)
CALCIUM SERPL-MCNC: 9.9 MG/DL (ref 8.8–10.4)
CHLORIDE SERPL-SCNC: 106 MMOL/L (ref 98–107)
CHOLEST SERPL-MCNC: 179 MG/DL
CREAT SERPL-MCNC: 0.68 MG/DL (ref 0.51–0.95)
EGFRCR SERPLBLD CKD-EPI 2021: 84 ML/MIN/1.73M2
ERYTHROCYTE [DISTWIDTH] IN BLOOD BY AUTOMATED COUNT: 14 % (ref 10–15)
EST. AVERAGE GLUCOSE BLD GHB EST-MCNC: 120 MG/DL
FASTING STATUS PATIENT QL REPORTED: NORMAL
GLUCOSE SERPL-MCNC: 90 MG/DL (ref 70–99)
HBA1C MFR BLD: 5.8 %
HCO3 SERPL-SCNC: 30 MMOL/L (ref 22–29)
HCT VFR BLD AUTO: 44.1 % (ref 35–47)
HDLC SERPL-MCNC: 69 MG/DL
HGB BLD-MCNC: 13.8 G/DL (ref 11.7–15.7)
LDLC SERPL CALC-MCNC: 90 MG/DL
MAGNESIUM SERPL-MCNC: 2 MG/DL (ref 1.7–2.3)
MCH RBC QN AUTO: 31.1 PG (ref 26.5–33)
MCHC RBC AUTO-ENTMCNC: 31.3 G/DL (ref 31.5–36.5)
MCV RBC AUTO: 99 FL (ref 78–100)
NONHDLC SERPL-MCNC: 110 MG/DL
PLATELET # BLD AUTO: 189 10E3/UL (ref 150–450)
POTASSIUM SERPL-SCNC: 4.3 MMOL/L (ref 3.4–5.3)
PROT SERPL-MCNC: 6.3 G/DL (ref 6.4–8.3)
RBC # BLD AUTO: 4.44 10E6/UL (ref 3.8–5.2)
SODIUM SERPL-SCNC: 143 MMOL/L (ref 135–145)
TRIGL SERPL-MCNC: 98 MG/DL
VIT B12 SERPL-MCNC: 723 PG/ML (ref 232–1245)
VIT D+METAB SERPL-MCNC: 63 NG/ML (ref 20–50)
WBC # BLD AUTO: 4.6 10E3/UL (ref 4–11)

## 2025-04-03 PROCEDURE — 85027 COMPLETE CBC AUTOMATED: CPT | Mod: ORL | Performed by: NURSE PRACTITIONER

## 2025-04-03 PROCEDURE — P9604 ONE-WAY ALLOW PRORATED TRIP: HCPCS | Mod: ORL | Performed by: NURSE PRACTITIONER

## 2025-04-03 PROCEDURE — 83735 ASSAY OF MAGNESIUM: CPT | Mod: ORL | Performed by: NURSE PRACTITIONER

## 2025-04-03 PROCEDURE — 80061 LIPID PANEL: CPT | Mod: ORL | Performed by: NURSE PRACTITIONER

## 2025-04-03 PROCEDURE — 36415 COLL VENOUS BLD VENIPUNCTURE: CPT | Mod: ORL | Performed by: NURSE PRACTITIONER

## 2025-04-03 PROCEDURE — 82306 VITAMIN D 25 HYDROXY: CPT | Mod: ORL | Performed by: NURSE PRACTITIONER

## 2025-04-03 PROCEDURE — 80053 COMPREHEN METABOLIC PANEL: CPT | Mod: ORL | Performed by: NURSE PRACTITIONER

## 2025-04-03 PROCEDURE — 83036 HEMOGLOBIN GLYCOSYLATED A1C: CPT | Mod: ORL | Performed by: NURSE PRACTITIONER

## 2025-04-03 PROCEDURE — 82607 VITAMIN B-12: CPT | Mod: ORL | Performed by: NURSE PRACTITIONER

## 2025-06-21 NOTE — ED NOTES
"[facility]  ED Nurse Handoff Report    ED Chief complaint: Chest Pain      ED Diagnosis:   Final diagnoses:   Chest pain, unspecified type       Code Status: Full Code    Allergies:   Allergies   Allergen Reactions     Dye [Contrast Dye] Hives and Itching     Iodine Hives     Seasonal Allergies        Patient Story: pt presents to ED with chest pain lasting 15-20 minutes at 2000 - resolved upon arrival in ED  Focused Assessment:  A&o x3, respirations easy and unlabored, denies chest pain at this time.     Treatments and/or interventions provided: labs, meds  Patient's response to treatments and/or interventions: fair    To be done/followed up on inpatient unit:  n/a    Does this patient have any cognitive concerns?: none    Activity level - Baseline/Home:  Independent  Activity Level - Current:   Independent    Patient's Preferred language: English   Needed?: No    Isolation: None  Infection: Not Applicable  Patient tested for COVID 19 prior to admission: YES  Bariatric?: No    Vital Signs:   Vitals:    06/27/21 2203 06/27/21 2207 06/27/21 2208   BP:  134/54    Pulse:  69    Resp:  18    Temp: 98.4  F (36.9  C)     TempSrc: Oral     SpO2:  97%    Weight:   46.7 kg (103 lb)   Height:   1.575 m (5' 2\")       Cardiac Rhythm:     Was the PSS-3 completed:   Yes  What interventions are required if any?               Family Comments:  at bedside  OBS brochure/video discussed/provided to patient/family: N/A              Name of person given brochure if not patient: n/a              Relationship to patient: n/a    For the majority of the shift this patient's behavior was Green.   Behavioral interventions performed were reassurance and information.    ED NURSE PHONE NUMBER: *[id number]         " No indicators present